# Patient Record
Sex: MALE | Race: WHITE | NOT HISPANIC OR LATINO | Employment: FULL TIME | URBAN - METROPOLITAN AREA
[De-identification: names, ages, dates, MRNs, and addresses within clinical notes are randomized per-mention and may not be internally consistent; named-entity substitution may affect disease eponyms.]

---

## 2017-02-14 ENCOUNTER — OFFICE VISIT (OUTPATIENT)
Dept: FAMILY MEDICINE CLINIC | Facility: CLINIC | Age: 42
End: 2017-02-14

## 2017-02-14 VITALS
BODY MASS INDEX: 33.5 KG/M2 | OXYGEN SATURATION: 98 % | HEIGHT: 70 IN | RESPIRATION RATE: 20 BRPM | SYSTOLIC BLOOD PRESSURE: 124 MMHG | WEIGHT: 234 LBS | DIASTOLIC BLOOD PRESSURE: 78 MMHG | HEART RATE: 66 BPM

## 2017-02-14 DIAGNOSIS — Z00.00 HEALTHCARE MAINTENANCE: Primary | ICD-10-CM

## 2017-02-14 LAB
ALBUMIN SERPL-MCNC: 4.5 G/DL (ref 3.2–4.8)
ALBUMIN/GLOB SERPL: 1.6 G/DL (ref 1.5–2.5)
ALP SERPL-CCNC: 49 U/L (ref 25–100)
ALT SERPL W P-5'-P-CCNC: 36 U/L (ref 7–40)
ANION GAP SERPL CALCULATED.3IONS-SCNC: 4 MMOL/L (ref 3–11)
ARTICHOKE IGE QN: 161 MG/DL (ref 0–130)
AST SERPL-CCNC: 31 U/L (ref 0–33)
BASOPHILS # BLD AUTO: 0.05 10*3/MM3 (ref 0–0.2)
BASOPHILS NFR BLD AUTO: 0.6 % (ref 0–1)
BILIRUB BLD-MCNC: NEGATIVE MG/DL
BILIRUB SERPL-MCNC: 0.6 MG/DL (ref 0.3–1.2)
BUN BLD-MCNC: 14 MG/DL (ref 9–23)
BUN/CREAT SERPL: 20 (ref 7–25)
CALCIUM SPEC-SCNC: 9.8 MG/DL (ref 8.7–10.4)
CHLORIDE SERPL-SCNC: 103 MMOL/L (ref 99–109)
CHOLEST SERPL-MCNC: 239 MG/DL (ref 0–200)
CLARITY, POC: CLEAR
CO2 SERPL-SCNC: 31 MMOL/L (ref 20–31)
COLOR UR: YELLOW
CREAT BLD-MCNC: 0.7 MG/DL (ref 0.6–1.3)
CRP SERPL-MCNC: 1.5 MG/DL (ref 0–10)
DEPRECATED RDW RBC AUTO: 44.2 FL (ref 37–54)
EOSINOPHIL # BLD AUTO: 0.09 10*3/MM3 (ref 0.1–0.3)
EOSINOPHIL NFR BLD AUTO: 1.1 % (ref 0–3)
ERYTHROCYTE [DISTWIDTH] IN BLOOD BY AUTOMATED COUNT: 12.9 % (ref 11.3–14.5)
GFR SERPL CREATININE-BSD FRML MDRD: 124 ML/MIN/1.73
GLOBULIN UR ELPH-MCNC: 2.8 GM/DL
GLUCOSE BLD-MCNC: 88 MG/DL (ref 70–100)
GLUCOSE UR STRIP-MCNC: NEGATIVE MG/DL
HCT VFR BLD AUTO: 47.3 % (ref 38.9–50.9)
HDLC SERPL-MCNC: 55 MG/DL (ref 40–60)
HGB BLD-MCNC: 15.7 G/DL (ref 13.1–17.5)
IMM GRANULOCYTES # BLD: 0.02 10*3/MM3 (ref 0–0.03)
IMM GRANULOCYTES NFR BLD: 0.2 % (ref 0–0.6)
KETONES UR QL: NEGATIVE
LEUKOCYTE EST, POC: NEGATIVE
LYMPHOCYTES # BLD AUTO: 2.81 10*3/MM3 (ref 0.6–4.8)
LYMPHOCYTES NFR BLD AUTO: 34.8 % (ref 24–44)
MCH RBC QN AUTO: 30.9 PG (ref 27–31)
MCHC RBC AUTO-ENTMCNC: 33.2 G/DL (ref 32–36)
MCV RBC AUTO: 93.1 FL (ref 80–99)
MONOCYTES # BLD AUTO: 0.53 10*3/MM3 (ref 0–1)
MONOCYTES NFR BLD AUTO: 6.6 % (ref 0–12)
NEUTROPHILS # BLD AUTO: 4.57 10*3/MM3 (ref 1.5–8.3)
NEUTROPHILS NFR BLD AUTO: 56.7 % (ref 41–71)
NITRITE UR-MCNC: NEGATIVE MG/ML
PH UR: 6 [PH] (ref 5–8)
PLATELET # BLD AUTO: 247 10*3/MM3 (ref 150–450)
PMV BLD AUTO: 10.1 FL (ref 6–12)
POTASSIUM BLD-SCNC: 4.6 MMOL/L (ref 3.5–5.5)
PROT SERPL-MCNC: 7.3 G/DL (ref 5.7–8.2)
PROT UR STRIP-MCNC: NEGATIVE MG/DL
RBC # BLD AUTO: 5.08 10*6/MM3 (ref 4.2–5.76)
RBC # UR STRIP: NEGATIVE /UL
SODIUM BLD-SCNC: 138 MMOL/L (ref 132–146)
SP GR UR: 1.01 (ref 1–1.03)
TRIGL SERPL-MCNC: 230 MG/DL (ref 0–150)
TSH SERPL DL<=0.05 MIU/L-ACNC: 1.25 MIU/ML (ref 0.35–5.35)
URATE SERPL-MCNC: 6.6 MG/DL (ref 3.7–9.2)
UROBILINOGEN UR QL: NORMAL
WBC NRBC COR # BLD: 8.07 10*3/MM3 (ref 3.5–10.8)

## 2017-02-14 PROCEDURE — 81003 URINALYSIS AUTO W/O SCOPE: CPT | Performed by: FAMILY MEDICINE

## 2017-02-14 PROCEDURE — 36415 COLL VENOUS BLD VENIPUNCTURE: CPT | Performed by: FAMILY MEDICINE

## 2017-02-14 PROCEDURE — 99396 PREV VISIT EST AGE 40-64: CPT | Performed by: FAMILY MEDICINE

## 2017-02-14 PROCEDURE — 86140 C-REACTIVE PROTEIN: CPT | Performed by: FAMILY MEDICINE

## 2017-02-14 PROCEDURE — 85025 COMPLETE CBC W/AUTO DIFF WBC: CPT | Performed by: FAMILY MEDICINE

## 2017-02-14 PROCEDURE — 80061 LIPID PANEL: CPT | Performed by: FAMILY MEDICINE

## 2017-02-14 PROCEDURE — 84550 ASSAY OF BLOOD/URIC ACID: CPT | Performed by: FAMILY MEDICINE

## 2017-02-14 PROCEDURE — 80053 COMPREHEN METABOLIC PANEL: CPT | Performed by: FAMILY MEDICINE

## 2017-02-14 PROCEDURE — 84443 ASSAY THYROID STIM HORMONE: CPT | Performed by: FAMILY MEDICINE

## 2017-02-14 RX ORDER — AZELASTINE HYDROCHLORIDE AND FLUTICASONE PROPIONATE 137; 50 UG/1; UG/1
SPRAY, METERED NASAL
COMMUNITY
Start: 2016-11-28 | End: 2018-07-24

## 2017-02-14 NOTE — PROGRESS NOTES
Subjective   Louie Levy is a 42 y.o. male.     History of Present Illness   He is here for his annual fasting wellness evaluation.  He declines the annual flu shot.  He voices no acute symptoms.    Review of Systems   Constitutional: Negative.    HENT: Negative.    Eyes: Negative.    Respiratory: Negative.    Cardiovascular: Negative.    Gastrointestinal: Negative.    Endocrine: Negative.    Genitourinary: Negative.    Musculoskeletal: Negative.    Skin: Negative.    Allergic/Immunologic: Negative.    Neurological: Negative.    Hematological: Negative.    Psychiatric/Behavioral: Negative.      Objective   Physical Exam   Constitutional: He is oriented to person, place, and time. He appears well-developed and well-nourished. He is cooperative.   HENT:   Head: Normocephalic and atraumatic.   Right Ear: Hearing and external ear normal.   Left Ear: Hearing and external ear normal.   Nose: Nose normal.   Mouth/Throat: Uvula is midline, oropharynx is clear and moist and mucous membranes are normal.   Eyes: Conjunctivae and EOM are normal. Pupils are equal, round, and reactive to light. No scleral icterus.   Neck: Trachea normal and normal range of motion. Neck supple. No JVD present. Carotid bruit is not present. No thyromegaly present.   Cardiovascular: Normal rate, regular rhythm, normal heart sounds and intact distal pulses.    Pulmonary/Chest: Effort normal and breath sounds normal.   Abdominal: Soft. Bowel sounds are normal. There is no hepatosplenomegaly. There is no tenderness.   Musculoskeletal: Normal range of motion.   Lymphadenopathy:     He has no cervical adenopathy.   Neurological: He is alert and oriented to person, place, and time. He has normal strength and normal reflexes. No sensory deficit. Gait normal.   Skin: Skin is warm and dry.   Psychiatric: He has a normal mood and affect. His speech is normal and behavior is normal. Judgment and thought content normal. Cognition and memory are normal.    Nursing note and vitals reviewed.    Assessment/Plan   Diagnoses and all orders for this visit:    Healthcare maintenance  -     POC Urinalysis Dipstick, Automated  -     Comprehensive Metabolic Panel  -     CBC & Differential  -     Lipid Panel  -     TSH  -     Uric Acid  -     C-reactive Protein    The patient is here for a health maintenance visit.  Currently, the patient consumes a calorie enriched diet and has an inadequate exercise regimen. Screening lab work is ordered.  Immunizations are reported as current.  He declined the annual flu shot.  Advice and education is given regarding nutrition, aerobic exercise, routine dental evaluations, routine eye exams, reproductive health, cardiovascular risk reduction, sunscreen use, self skin examination (annual dermatology evaluations) and seat belt use (general overall safety).  Further recommendations after lab evaluation.  Annual wellness evaluations recommended.

## 2018-08-28 ENCOUNTER — OFFICE VISIT (OUTPATIENT)
Dept: FAMILY MEDICINE CLINIC | Facility: CLINIC | Age: 43
End: 2018-08-28

## 2018-08-28 VITALS
SYSTOLIC BLOOD PRESSURE: 110 MMHG | BODY MASS INDEX: 25.97 KG/M2 | HEART RATE: 90 BPM | WEIGHT: 181 LBS | RESPIRATION RATE: 16 BRPM | DIASTOLIC BLOOD PRESSURE: 80 MMHG | TEMPERATURE: 98.3 F | OXYGEN SATURATION: 98 %

## 2018-08-28 DIAGNOSIS — R53.83 FATIGUE, UNSPECIFIED TYPE: Primary | ICD-10-CM

## 2018-08-28 PROCEDURE — 36415 COLL VENOUS BLD VENIPUNCTURE: CPT | Performed by: FAMILY MEDICINE

## 2018-08-28 PROCEDURE — 99213 OFFICE O/P EST LOW 20 MIN: CPT | Performed by: FAMILY MEDICINE

## 2018-08-28 RX ORDER — FEXOFENADINE HYDROCHLORIDE 60 MG/1
60 TABLET, FILM COATED ORAL DAILY
COMMUNITY
End: 2019-03-26

## 2018-08-28 RX ORDER — DEXTROAMPHETAMINE SACCHARATE, AMPHETAMINE ASPARTATE, DEXTROAMPHETAMINE SULFATE AND AMPHETAMINE SULFATE 2.5; 2.5; 2.5; 2.5 MG/1; MG/1; MG/1; MG/1
TABLET ORAL
Refills: 0 | COMMUNITY
Start: 2018-08-02 | End: 2021-10-22

## 2018-08-28 NOTE — PROGRESS NOTES
Subjective   Louie Levy is a 43 y.o. male.     History of Present Illness   Head congestion three months with chronic sinus pressure.  Throat drainage.  Has taken antibiotics three rounds without help.  Steroid initially helped then rapidly returned to original state.  Sleeps poor.  Yesterday tingling sensation in skin, muscles sore after camping with son.    Had been on keto diet since first of year and reached target weight.  No cold sensation.  No swelling. Irregular sleep pattern with work, no naps. Last felt good three months ago.   .  The following portions of the patient's history were reviewed and updated as appropriate: allergies, current medications, past family history, past medical history, past social history, past surgical history and problem list.    Review of Systems   Constitutional: Positive for fatigue. Negative for activity change, appetite change and fever.   HENT: Positive for sinus pressure.    Respiratory: Negative.    Gastrointestinal: Negative.        Objective   Physical Exam   Constitutional: He appears well-developed.   HENT:   Right Ear: External ear normal.   Left Ear: External ear normal.   Mouth/Throat: Oropharynx is clear and moist.   Neck: Neck supple.   Cardiovascular: Normal heart sounds.    Pulmonary/Chest: Breath sounds normal.   Lymphadenopathy:     He has no cervical adenopathy.   Vitals reviewed.      Assessment/Plan   Louie was seen today for uri and fatigue.    Diagnoses and all orders for this visit:    Fatigue, unspecified type  -     CBC & Differential  -     T4, Free  -     TSH  -     Vitamin D 25 Hydroxy  -     Hepatitis Panel, Acute  -     HIV-1/O/2 Ag/Ab w Reflex  -     SAM With / DsDNA, RNP, Sjogrens A / B, Smith  -     Sedimentation Rate        Consider evaluation by allergist with these chronic congestion problems.

## 2019-07-28 PROCEDURE — 87086 URINE CULTURE/COLONY COUNT: CPT | Performed by: PHYSICIAN ASSISTANT

## 2019-07-29 ENCOUNTER — TELEPHONE (OUTPATIENT)
Dept: URGENT CARE | Facility: CLINIC | Age: 44
End: 2019-07-29

## 2019-07-30 ENCOUNTER — TELEPHONE (OUTPATIENT)
Dept: URGENT CARE | Facility: CLINIC | Age: 44
End: 2019-07-30

## 2019-08-26 ENCOUNTER — OFFICE VISIT (OUTPATIENT)
Dept: FAMILY MEDICINE CLINIC | Facility: CLINIC | Age: 44
End: 2019-08-26

## 2019-08-26 VITALS
TEMPERATURE: 97.3 F | RESPIRATION RATE: 20 BRPM | DIASTOLIC BLOOD PRESSURE: 74 MMHG | BODY MASS INDEX: 25.91 KG/M2 | OXYGEN SATURATION: 97 % | HEART RATE: 67 BPM | SYSTOLIC BLOOD PRESSURE: 114 MMHG | WEIGHT: 181 LBS | HEIGHT: 70 IN

## 2019-08-26 DIAGNOSIS — Z00.00 HEALTHCARE MAINTENANCE: Primary | ICD-10-CM

## 2019-08-26 LAB
BILIRUB BLD-MCNC: NEGATIVE MG/DL
CLARITY, POC: CLEAR
COLOR UR: YELLOW
GLUCOSE UR STRIP-MCNC: NEGATIVE MG/DL
KETONES UR QL: NEGATIVE
LEUKOCYTE EST, POC: NEGATIVE
NITRITE UR-MCNC: NEGATIVE MG/ML
PH UR: 5.5 [PH] (ref 5–8)
PROT UR STRIP-MCNC: NEGATIVE MG/DL
RBC # UR STRIP: NEGATIVE /UL
SP GR UR: 1 (ref 1–1.03)
UROBILINOGEN UR QL: NORMAL

## 2019-08-26 PROCEDURE — 99396 PREV VISIT EST AGE 40-64: CPT | Performed by: FAMILY MEDICINE

## 2019-08-26 PROCEDURE — 81003 URINALYSIS AUTO W/O SCOPE: CPT | Performed by: FAMILY MEDICINE

## 2019-08-26 NOTE — PROGRESS NOTES
"Subjective   Louie Levy is a 44 y.o. male.     History of Present Illness   He is here for his annual fasting wellness evaluation.  He is current on his immunizations.  He voices no acute symptoms.  He continues care with [facility] with Igor PRESLEY/Dr. Joseph.    Medical History  I have reviewed and updated the following portions of the patient's history: allergies, current medications, past medical history, past surgical history, past family history, and past social history.    Review of Systems   Constitutional: Negative.    HENT: Negative.    Eyes: Negative.    Respiratory: Negative.    Cardiovascular: Negative.    Gastrointestinal: Negative.         Occasional colicky pain with stress   Endocrine: Negative.    Genitourinary: Negative.    Musculoskeletal: Negative.    Skin: Negative.    Allergic/Immunologic: Negative.    Neurological: Negative.    Hematological: Negative.    Psychiatric/Behavioral: Negative.        Objective   /74 (BP Location: Left arm, Patient Position: Sitting, Cuff Size: Adult)   Pulse 67   Temp 97.3 °F (36.3 °C) (Temporal)   Resp 20   Ht 177.8 cm (70\")   Wt 82.1 kg (181 lb)   SpO2 97%   BMI 25.97 kg/m²   Physical Exam   Constitutional: He is oriented to person, place, and time. He appears well-developed and well-nourished. He is cooperative.   HENT:   Head: Normocephalic and atraumatic.   Right Ear: Hearing and external ear normal.   Left Ear: Hearing and external ear normal.   Nose: Nose normal.   Mouth/Throat: Uvula is midline, oropharynx is clear and moist and mucous membranes are normal.   Eyes: Conjunctivae and EOM are normal. Pupils are equal, round, and reactive to light. No scleral icterus.   Neck: Trachea normal and normal range of motion. Neck supple. No JVD present. Carotid bruit is not present. No thyromegaly present.   Cardiovascular: Normal rate, regular rhythm, normal heart sounds and intact distal pulses.   Pulmonary/Chest: Effort normal " and breath sounds normal.   Abdominal: Soft. Bowel sounds are normal. There is no hepatosplenomegaly. There is no tenderness.   Musculoskeletal: Normal range of motion.   Lymphadenopathy:     He has no cervical adenopathy.   Neurological: He is alert and oriented to person, place, and time. He has normal strength and normal reflexes. No sensory deficit. Gait normal.   Skin: Skin is warm and dry.   Psychiatric: He has a normal mood and affect. His speech is normal and behavior is normal. Judgment and thought content normal. Cognition and memory are normal.   Nursing note and vitals reviewed.    Assessment/Plan   Diagnoses and all orders for this visit:    Healthcare maintenance  -     Comprehensive Metabolic Panel  -     POC Urinalysis Dipstick, Automated  -     CBC & Differential  -     Lipid Panel  -     TSH  -     Uric Acid  -     C-reactive Protein  -     HIV-1 / O / 2 Ag / Antibody 4th Generation  -     Hemoglobin A1c    The patient is here for a health maintenance visit.  Currently, the patient consumes a healthy diet and has an adequate exercise regimen. Screening lab work is ordered.  Immunizations are reviewed today.  Advice and education is given regarding nutrition, aerobic exercise, routine dental evaluations, routine eye exams, reproductive health, cardiovascular risk reduction, sunscreen use, self skin examination (annual dermatology evaluations) and seat belt use (general overall safety).  Further recommendations after lab evaluation.  Annual wellness evaluations recommended.

## 2019-08-27 LAB
ALBUMIN SERPL-MCNC: 4.9 G/DL (ref 3.5–5.2)
ALBUMIN/GLOB SERPL: 2.1 G/DL
ALP SERPL-CCNC: 39 U/L (ref 39–117)
ALT SERPL-CCNC: 17 U/L (ref 1–41)
AST SERPL-CCNC: 19 U/L (ref 1–40)
BASOPHILS # BLD AUTO: 0.03 10*3/MM3 (ref 0–0.2)
BASOPHILS NFR BLD AUTO: 0.6 % (ref 0–1.5)
BILIRUB SERPL-MCNC: 0.4 MG/DL (ref 0.2–1.2)
BUN SERPL-MCNC: 11 MG/DL (ref 6–20)
BUN/CREAT SERPL: 13.9 (ref 7–25)
CALCIUM SERPL-MCNC: 9.2 MG/DL (ref 8.6–10.5)
CHLORIDE SERPL-SCNC: 103 MMOL/L (ref 98–107)
CHOLEST SERPL-MCNC: 184 MG/DL (ref 0–200)
CO2 SERPL-SCNC: 25.3 MMOL/L (ref 22–29)
CREAT SERPL-MCNC: 0.79 MG/DL (ref 0.76–1.27)
CRP SERPL-MCNC: 0.07 MG/DL (ref 0–0.5)
EOSINOPHIL # BLD AUTO: 0.05 10*3/MM3 (ref 0–0.4)
EOSINOPHIL NFR BLD AUTO: 1 % (ref 0.3–6.2)
ERYTHROCYTE [DISTWIDTH] IN BLOOD BY AUTOMATED COUNT: 12.7 % (ref 12.3–15.4)
GLOBULIN SER CALC-MCNC: 2.3 GM/DL
GLUCOSE SERPL-MCNC: 97 MG/DL (ref 65–99)
HBA1C MFR BLD: 5.1 % (ref 4.8–5.6)
HCT VFR BLD AUTO: 46.9 % (ref 37.5–51)
HDLC SERPL-MCNC: 63 MG/DL (ref 40–60)
HGB BLD-MCNC: 15 G/DL (ref 13–17.7)
HIV 1+2 AB+HIV1 P24 AG SERPL QL IA: NON REACTIVE
IMM GRANULOCYTES # BLD AUTO: 0 10*3/MM3 (ref 0–0.05)
IMM GRANULOCYTES NFR BLD AUTO: 0 % (ref 0–0.5)
LDLC SERPL CALC-MCNC: 106 MG/DL (ref 0–100)
LYMPHOCYTES # BLD AUTO: 1.8 10*3/MM3 (ref 0.7–3.1)
LYMPHOCYTES NFR BLD AUTO: 35.4 % (ref 19.6–45.3)
MCH RBC QN AUTO: 30.7 PG (ref 26.6–33)
MCHC RBC AUTO-ENTMCNC: 32 G/DL (ref 31.5–35.7)
MCV RBC AUTO: 95.9 FL (ref 79–97)
MONOCYTES # BLD AUTO: 0.36 10*3/MM3 (ref 0.1–0.9)
MONOCYTES NFR BLD AUTO: 7.1 % (ref 5–12)
NEUTROPHILS # BLD AUTO: 2.85 10*3/MM3 (ref 1.7–7)
NEUTROPHILS NFR BLD AUTO: 55.9 % (ref 42.7–76)
NRBC BLD AUTO-RTO: 0 /100 WBC (ref 0–0.2)
PLATELET # BLD AUTO: 237 10*3/MM3 (ref 140–450)
POTASSIUM SERPL-SCNC: 4.3 MMOL/L (ref 3.5–5.2)
PROT SERPL-MCNC: 7.2 G/DL (ref 6–8.5)
RBC # BLD AUTO: 4.89 10*6/MM3 (ref 4.14–5.8)
SODIUM SERPL-SCNC: 144 MMOL/L (ref 136–145)
TRIGL SERPL-MCNC: 75 MG/DL (ref 0–150)
TSH SERPL DL<=0.005 MIU/L-ACNC: 1.23 UIU/ML (ref 0.45–4.5)
URATE SERPL-MCNC: 4.9 MG/DL (ref 3.4–7)
VLDLC SERPL CALC-MCNC: 15 MG/DL
WBC # BLD AUTO: 5.09 10*3/MM3 (ref 3.4–10.8)

## 2020-03-10 ENCOUNTER — OFFICE VISIT (OUTPATIENT)
Dept: INTERNAL MEDICINE | Facility: CLINIC | Age: 45
End: 2020-03-10

## 2020-03-10 VITALS
SYSTOLIC BLOOD PRESSURE: 128 MMHG | HEART RATE: 82 BPM | OXYGEN SATURATION: 99 % | RESPIRATION RATE: 16 BRPM | BODY MASS INDEX: 25.48 KG/M2 | HEIGHT: 70 IN | WEIGHT: 178 LBS | DIASTOLIC BLOOD PRESSURE: 80 MMHG

## 2020-03-10 DIAGNOSIS — Z76.89 ESTABLISHING CARE WITH NEW DOCTOR, ENCOUNTER FOR: Primary | ICD-10-CM

## 2020-03-10 DIAGNOSIS — F90.0 ATTENTION DEFICIT HYPERACTIVITY DISORDER (ADHD), PREDOMINANTLY INATTENTIVE TYPE: ICD-10-CM

## 2020-03-10 PROBLEM — F90.9 ADHD (ATTENTION DEFICIT HYPERACTIVITY DISORDER): Status: ACTIVE | Noted: 2020-03-10

## 2020-03-10 PROCEDURE — 99213 OFFICE O/P EST LOW 20 MIN: CPT | Performed by: NURSE PRACTITIONER

## 2020-03-10 NOTE — PROGRESS NOTES
Louie Levy is a 45 y.o. male who presents today to establish care and discuss meds.    Chief Complaint   Patient presents with   • Establish Care     Pt would like to discuss medications and long term effects.   • ADD       45-year-old male presents to office to establish care and also to discuss his current medications he takes for ADD.  He has a family history of heart disease, and is concerned that some of these medications could make him more predisposed to having heart disease as well, and he just wanted to discuss other options as well as discuss other options with [facility] who currently manages his medications.      The following portions of the patient's history were reviewed and updated as appropriate: allergies, current medications, past family history, past medical history, past social history, past surgical history and problem list.     Past Medical History:   Diagnosis Date   • ADHD (attention deficit hyperactivity disorder)    • Closed fracture of left clavicle    • Mood disorder (CMS/HCC)    • Seasonal allergies        Past Surgical History:   Procedure Laterality Date   • CLAVICLE SURGERY Left    • NASAL SEPTAL RECONSTRUCTION     • WISDOM TOOTH EXTRACTION         Family History   Problem Relation Age of Onset   • Heart failure Mother    • Heart disease Father        Social History     Socioeconomic History   • Marital status:      Spouse name: Annetta   • Number of children: 1   • Years of education: College   • Highest education level: Bachelor's degree (e.g., BA, AB, BS)   Occupational History   • Occupation:      Employer: [employer]   Tobacco Use   • Smoking status: Never Smoker   • Smokeless tobacco: Never Used   Substance and Sexual Activity   • Alcohol use: Yes     Alcohol/week: 1.0 standard drinks     Types: 1 Cans of beer per week     Frequency: Monthly or less   • Drug use: No   • Sexual activity: Yes     Partners: Female  "      No Known Allergies      Current Outpatient Medications:   •  amphetamine-dextroamphetamine (ADDERALL) 10 MG tablet, take 1 tablet by mouth every AFTERNOON, Disp: , Rfl: 0  •  lisdexamfetamine (VYVANSE) 40 MG capsule, Take 40 mg by mouth Every Morning, Disp: , Rfl:     Health Maintenance   Topic Date Due   • ANNUAL PHYSICAL  08/27/2020   • TDAP/TD VACCINES (3 - Td) 03/26/2029   • INFLUENZA VACCINE  Completed        ROS    Review of Systems   Constitutional: Negative for chills and fever.   Eyes: Negative for blurred vision and visual disturbance.   Respiratory: Negative for cough.    Cardiovascular: Negative for chest pain, palpitations and leg swelling.   Gastrointestinal: Negative for abdominal pain and nausea.   Skin: Negative for rash.   Allergic/Immunologic: Negative for immunocompromised state.   Neurological: Negative for headache.   Hematological: Negative for adenopathy.   Psychiatric/Behavioral: Negative for sleep disturbance, suicidal ideas and stress. The patient is not nervous/anxious.        Visit Vitals  /80   Pulse 82   Resp 16   Ht 177.8 cm (70\")   Wt 80.7 kg (178 lb)   SpO2 99%   BMI 25.54 kg/m²       Physical Exam     Physical Exam    Assessment/Plan   Problems Addressed this Visit        Other    ADHD (attention deficit hyperactivity disorder)     Psychological condition is improving with treatment.  Continue current treatment regimen.  Psychological condition  will be reassessed at the next regular appointment.           Other Visit Diagnoses     Establishing care with new doctor, encounter for    -  Primary               Plan of care reviewed with patient at the conclusion of today's visit. Education was provided regarding diagnosis, management and any prescribed or recommended OTC medications.  Patient verbalizes understanding of and agreement with management plan.    Return in about 5 months (around 8/10/2020) for Annual.    SAKINA Lawrence  "

## 2020-09-08 ENCOUNTER — OFFICE VISIT (OUTPATIENT)
Dept: FAMILY MEDICINE CLINIC | Facility: CLINIC | Age: 45
End: 2020-09-08

## 2020-09-08 VITALS
OXYGEN SATURATION: 100 % | SYSTOLIC BLOOD PRESSURE: 142 MMHG | TEMPERATURE: 98 F | BODY MASS INDEX: 26.37 KG/M2 | HEART RATE: 88 BPM | RESPIRATION RATE: 20 BRPM | DIASTOLIC BLOOD PRESSURE: 90 MMHG | HEIGHT: 70 IN | WEIGHT: 184.2 LBS

## 2020-09-08 DIAGNOSIS — L98.9 SKIN LESION: ICD-10-CM

## 2020-09-08 DIAGNOSIS — Z12.5 ENCOUNTER FOR PROSTATE CANCER SCREENING: ICD-10-CM

## 2020-09-08 DIAGNOSIS — R03.0 ELEVATED BLOOD PRESSURE READING: ICD-10-CM

## 2020-09-08 DIAGNOSIS — F90.0 ATTENTION DEFICIT HYPERACTIVITY DISORDER (ADHD), PREDOMINANTLY INATTENTIVE TYPE: ICD-10-CM

## 2020-09-08 DIAGNOSIS — J30.2 SEASONAL ALLERGIES: ICD-10-CM

## 2020-09-08 DIAGNOSIS — Z00.00 HEALTH CARE MAINTENANCE: Primary | ICD-10-CM

## 2020-09-08 DIAGNOSIS — H93.13 TINNITUS OF BOTH EARS: ICD-10-CM

## 2020-09-08 PROCEDURE — 99396 PREV VISIT EST AGE 40-64: CPT | Performed by: NURSE PRACTITIONER

## 2020-09-08 PROCEDURE — 93000 ELECTROCARDIOGRAM COMPLETE: CPT | Performed by: NURSE PRACTITIONER

## 2020-09-08 RX ORDER — FLUTICASONE PROPIONATE 50 MCG
SPRAY, SUSPENSION (ML) NASAL
COMMUNITY
Start: 2020-03-01 | End: 2021-10-22

## 2020-09-08 RX ORDER — MULTIVITAMIN/IRON/FOLIC ACID 18MG-0.4MG
TABLET ORAL
Status: ON HOLD | COMMUNITY
Start: 2018-11-01 | End: 2023-04-01

## 2020-09-08 RX ORDER — KRILL/OM-3/DHA/EPA/PHOSPHO/AST 500MG-86MG
CAPSULE ORAL
Status: ON HOLD | COMMUNITY
Start: 2020-05-01 | End: 2023-04-01

## 2020-09-08 NOTE — PROGRESS NOTES
"Patient is here for annual wellness exam. Former patient of Dr Heredia    Concerns today left ear drainage internally and roaring in his ears. No fever or otalgia. Slightly diminished hearing. Has chronic tinnitus. Has been taking mucinex-DM.    He takes adderall prn in afternoon. Taking vyvanse x 2 years. Prescribed by [facility]-Dagoberto Lyon.    Last health maintenance visit was 1 year . Overall they feel their health is good. Lives with spouse, children and son is 11.  Occupation is . Patient's diet is in general, a \"healthy\" diet  . Exercises regularly regularly 12,000 steps a day and also pushups. Tobacco use Never used. Alcohol use is 2 liquor drinks per week . Illicit drug no history of illicit drug use    Reproductive Health  Patient is sexually active and prefers heterosexual partners.  no method at present time     Screening Tests  Vision Impairment. Needs Eye Exam   Hearing normal  Dental: Brushes does teeth twice a day . Dental exam every six months?yes  Colonoscopy no  Prostate Exam no      Immunization History  Tdap? yes  HPV? not applicable  Pneumonia? not applicable  Shingles? not applicable    The following portions of the patient's history were reviewed and updated as appropriate: allergies, current medications, past family history, past medical history, past social history, past surgical history and problem list.    Past Medical History:   Diagnosis Date   • ADHD (attention deficit hyperactivity disorder)    • Closed fracture of left clavicle    • Mood disorder (CMS/HCC)    • Seasonal allergies    • Tinnitus of both ears 2020       Family History   Problem Relation Age of Onset   • Heart failure Mother    • Heart disease Father    • Skin cancer Father    • Heart failure Father    • No Known Problems Sister    • Heart disease Maternal Grandmother    • Heart failure Paternal Grandfather    • No Known Problems Sister    • No Known Problems Sister        Past Surgical " History:   Procedure Laterality Date   • CLAVICLE SURGERY Left 2015   • NASAL SEPTAL RECONSTRUCTION     • WISDOM TOOTH EXTRACTION         Social History     Socioeconomic History   • Marital status:      Spouse name: Annetta   • Number of children: 1   • Years of education: College   • Highest education level: Bachelor's degree (e.g., BA, AB, BS)   Occupational History   • Occupation:      Employer: [employer]   Tobacco Use   • Smoking status: Never Smoker   • Smokeless tobacco: Never Used   Substance and Sexual Activity   • Alcohol use: Yes     Alcohol/week: 1.0 standard drinks     Types: 1 Cans of beer per week     Frequency: Monthly or less   • Drug use: No   • Sexual activity: Yes     Partners: Female       Review of Systems  Do you have pain that bothers you in your daily life? no  Review of Systems   Constitutional: Negative for diaphoresis, fatigue, fever and unexpected weight change.   HENT: Positive for ear pain. Negative for congestion, hearing loss, nosebleeds, rhinorrhea, sore throat, trouble swallowing and voice change.    Eyes: Negative for discharge, redness and visual disturbance.   Respiratory: Negative for cough, chest tightness, shortness of breath and wheezing.    Cardiovascular: Negative for chest pain, palpitations and leg swelling.   Gastrointestinal: Negative for abdominal pain, blood in stool, constipation, diarrhea, nausea and vomiting.   Endocrine: Negative for polydipsia, polyphagia and polyuria.   Genitourinary: Positive for dysuria. Negative for flank pain, frequency and hematuria.   Musculoskeletal: Positive for arthralgias. Negative for joint swelling and myalgias.        Left clavicle pain with plate   Skin: Negative for color change and rash.        Would like a full skin survey since he previously lived in [facility] and has family history of skin cancer.   Neurological: Negative for dizziness, seizures, syncope, weakness and headaches.    Hematological: Negative for adenopathy. Does not bruise/bleed easily.   Psychiatric/Behavioral: Positive for decreased concentration. Negative for dysphoric mood. The patient is not nervous/anxious.        Objective   Physical Exam   Constitutional: He is oriented to person, place, and time. He appears well-developed and well-nourished. No distress.   HENT:   Head: Normocephalic and atraumatic.   Nose: Nose normal.   Eyes: Conjunctivae are normal. Right eye exhibits no discharge. Left eye exhibits no discharge. No scleral icterus.   Neck: Normal range of motion. Neck supple. No thyromegaly present.   Cardiovascular: Normal rate, regular rhythm, normal heart sounds and intact distal pulses. Exam reveals no gallop and no friction rub.   No murmur heard.  Pulmonary/Chest: Effort normal and breath sounds normal. No respiratory distress. He has no wheezes. He has no rales.   Abdominal: Soft. Bowel sounds are normal. He exhibits no distension and no mass. There is no tenderness. There is no rebound and no guarding.   Musculoskeletal: Normal range of motion. He exhibits no edema or deformity.   Lymphadenopathy:     He has no cervical adenopathy.   Neurological: He is alert and oriented to person, place, and time. He has normal reflexes. He displays normal reflexes. No cranial nerve deficit. Coordination normal.   Skin: Skin is warm and dry. No rash noted.   Psychiatric: He has a normal mood and affect. His behavior is normal. Judgment and thought content normal.   Vitals reviewed.         ECG 12 Lead  Date/Time: 9/8/2020 2:03 PM  Performed by: Shaheen Noe DNP, APRN  Authorized by: Shaheen Noe DNP, SAKINA   Comparison: not compared with previous ECG   Previous ECG: no previous ECG available  Rhythm: sinus rhythm  Rate: normal  BPM: 75  Conduction: conduction normal  ST Segments: ST segments normal  T Waves: T waves normal  QRS axis: normal  Other: no other findings    Clinical impression: normal  ECG          Louie was seen today for annual exam and ear drainage.    Diagnoses and all orders for this visit:    Health care maintenance  -     Ambulatory Referral to Dermatology  -     CBC & Differential  -     Comprehensive Metabolic Panel  -     Hemoglobin A1c  -     Hepatitis C Antibody  -     Lipid Panel  -     T4, Free  -     TSH  -     Vitamin D 25 Hydroxy  -     ECG 12 Lead  -     CBC Auto Differential    Skin lesion  -     Ambulatory Referral to Dermatology    Encounter for prostate cancer screening  -     PSA Screen    Elevated blood pressure reading    Attention deficit hyperactivity disorder (ADHD), predominantly inattentive type  Comments:  Discussed risks/benefits of vyvanse. He lost 55 pounds and it controls his anxiety and attention deficit. Continue meds for now. Can discus with psych as well.    Seasonal allergies    Tinnitus of both ears  Comments:  Cont mucinex and antihistamines.        1. Monitor bp at home and bring log. Encourage DASH diet and 150 minutes of weekly exercise. Reviewed signs and symptoms of MI and stroke. If symptoms persist or worsen go to the ER. May need to start meds if consistently > 140/90.    2. Patient Counseling:  --Nutrition: Stressed importance of moderation in sodium/caffeine intake, saturated fat and cholesterol, caloric balance, sufficient intake of fresh fruits, vegetables, fiber, calcium, iron, and 1 mg of folate supplement per day (for females capable of pregnancy).  --Discussed the issue of estrogen replacement, calcium supplement, and the daily use of baby aspirin.  --Exercise: Stressed the importance of regular exercise.   --Substance Abuse: Discussed cessation/primary prevention of tobacco, alcohol, or other drug use; driving or other dangerous activities under the influence; availability of treatment for abuse.    --Sexuality: Discussed sexually transmitted diseases, partner selection, use of condoms, avoidance of unintended pregnancy  and contraceptive  alternatives.   --Injury prevention: Discussed safety belts, safety helmets, smoke detector, smoking near bedding or upholstery.   --Dental health: Discussed importance of regular tooth brushing, flossing, and dental visits.  --Immunizations reviewed.  --Discussed benefits of screening colonoscopy.  --After hours service discussed with patient    3. Discussed the patient's BMI with him.  The BMI is above average; BMI management plan is completed  4. Follow up in one year  5. Discussed the nature of the disease including, risks, complications, implications, management, safe and proper use of medications. Encouraged therapeutic lifestyle changes including low calorie diet with plenty of fruits and vegetables, daily exercise, medication compliance, and keeping scheduled follow up appointments with me and any other providers. Encouraged patient to have appointment for complete physical, fasting labs, appropriate screenings, and immunizations on an annual basis.

## 2020-09-08 NOTE — PROGRESS NOTES
"Subjective   Louie Levy is a 45 y.o. male.     History of Present Illness     Outpatient Encounter Medications as of 9/8/2020   Medication Sig Dispense Refill   • amphetamine-dextroamphetamine (ADDERALL) 10 MG tablet take 1 tablet by mouth every AFTERNOON  0   • Cetirizine HCl (ZyrTEC Allergy) 10 MG capsule      • fluticasone (FLONASE) 50 MCG/ACT nasal spray      • Krill Oil 500 MG capsule      • lisdexamfetamine (VYVANSE) 40 MG capsule Take 40 mg by mouth Every Morning     • Multiple Vitamins-Minerals (EQ COMPLETE MULTIVITAMIN-ADULT) tablet      • [DISCONTINUED] lisdexamfetamine (Vyvanse) 40 MG capsule        No facility-administered encounter medications on file as of 9/8/2020.        {Common H&P Review Areas:22829}    Review of Systems    Objective     Visit Vitals  /90   Pulse 88   Temp 98 °F (36.7 °C)   Resp 20   Ht 177.8 cm (70\")   Wt 83.6 kg (184 lb 3.2 oz)   SpO2 100%   BMI 26.43 kg/m²       Physical Exam      Assessment/Plan   {Assess/PlanSmartLinks:86530}           "

## 2020-09-09 ENCOUNTER — LAB (OUTPATIENT)
Dept: FAMILY MEDICINE CLINIC | Facility: CLINIC | Age: 45
End: 2020-09-09

## 2020-09-09 ENCOUNTER — TELEPHONE (OUTPATIENT)
Dept: FAMILY MEDICINE CLINIC | Facility: CLINIC | Age: 45
End: 2020-09-09

## 2020-09-09 LAB
25(OH)D3 SERPL-MCNC: 35.4 NG/ML (ref 30–100)
ALBUMIN SERPL-MCNC: 4.3 G/DL (ref 3.5–5.2)
ALBUMIN/GLOB SERPL: 1.5 G/DL
ALP SERPL-CCNC: 39 U/L (ref 39–117)
ALT SERPL W P-5'-P-CCNC: 18 U/L (ref 1–41)
ANION GAP SERPL CALCULATED.3IONS-SCNC: 10.6 MMOL/L (ref 5–15)
AST SERPL-CCNC: 27 U/L (ref 1–40)
BASOPHILS # BLD AUTO: 0.06 10*3/MM3 (ref 0–0.2)
BASOPHILS NFR BLD AUTO: 1.2 % (ref 0–1.5)
BILIRUB SERPL-MCNC: 0.4 MG/DL (ref 0–1.2)
BUN SERPL-MCNC: 15 MG/DL (ref 6–20)
BUN/CREAT SERPL: 18.1 (ref 7–25)
CALCIUM SPEC-SCNC: 9.3 MG/DL (ref 8.6–10.5)
CHLORIDE SERPL-SCNC: 103 MMOL/L (ref 98–107)
CHOLEST SERPL-MCNC: 186 MG/DL (ref 0–200)
CO2 SERPL-SCNC: 25.4 MMOL/L (ref 22–29)
CREAT SERPL-MCNC: 0.83 MG/DL (ref 0.76–1.27)
DEPRECATED RDW RBC AUTO: 40.3 FL (ref 37–54)
EOSINOPHIL # BLD AUTO: 0.07 10*3/MM3 (ref 0–0.4)
EOSINOPHIL NFR BLD AUTO: 1.3 % (ref 0.3–6.2)
ERYTHROCYTE [DISTWIDTH] IN BLOOD BY AUTOMATED COUNT: 12.1 % (ref 12.3–15.4)
GFR SERPL CREATININE-BSD FRML MDRD: 101 ML/MIN/1.73
GLOBULIN UR ELPH-MCNC: 2.8 GM/DL
GLUCOSE SERPL-MCNC: 95 MG/DL (ref 65–99)
HBA1C MFR BLD: 5.41 % (ref 4.8–5.6)
HCT VFR BLD AUTO: 44.2 % (ref 37.5–51)
HCV AB SER DONR QL: NORMAL
HDLC SERPL-MCNC: 68 MG/DL (ref 40–60)
HGB BLD-MCNC: 15.3 G/DL (ref 13–17.7)
IMM GRANULOCYTES # BLD AUTO: 0.01 10*3/MM3 (ref 0–0.05)
IMM GRANULOCYTES NFR BLD AUTO: 0.2 % (ref 0–0.5)
LDLC SERPL CALC-MCNC: 104 MG/DL (ref 0–100)
LDLC/HDLC SERPL: 1.53 {RATIO}
LYMPHOCYTES # BLD AUTO: 1.76 10*3/MM3 (ref 0.7–3.1)
LYMPHOCYTES NFR BLD AUTO: 33.9 % (ref 19.6–45.3)
MCH RBC QN AUTO: 31.4 PG (ref 26.6–33)
MCHC RBC AUTO-ENTMCNC: 34.6 G/DL (ref 31.5–35.7)
MCV RBC AUTO: 90.8 FL (ref 79–97)
MONOCYTES # BLD AUTO: 0.54 10*3/MM3 (ref 0.1–0.9)
MONOCYTES NFR BLD AUTO: 10.4 % (ref 5–12)
NEUTROPHILS NFR BLD AUTO: 2.75 10*3/MM3 (ref 1.7–7)
NEUTROPHILS NFR BLD AUTO: 53 % (ref 42.7–76)
NRBC BLD AUTO-RTO: 0 /100 WBC (ref 0–0.2)
PLATELET # BLD AUTO: 234 10*3/MM3 (ref 140–450)
PMV BLD AUTO: 10.1 FL (ref 6–12)
POTASSIUM SERPL-SCNC: 5.1 MMOL/L (ref 3.5–5.2)
PROT SERPL-MCNC: 7.1 G/DL (ref 6–8.5)
PSA SERPL-MCNC: 1.53 NG/ML (ref 0–4)
RBC # BLD AUTO: 4.87 10*6/MM3 (ref 4.14–5.8)
SODIUM SERPL-SCNC: 139 MMOL/L (ref 136–145)
T4 FREE SERPL-MCNC: 1.03 NG/DL (ref 0.93–1.7)
TRIGL SERPL-MCNC: 69 MG/DL (ref 0–150)
TSH SERPL DL<=0.05 MIU/L-ACNC: 1.41 UIU/ML (ref 0.27–4.2)
VLDLC SERPL-MCNC: 13.8 MG/DL (ref 5–40)
WBC # BLD AUTO: 5.19 10*3/MM3 (ref 3.4–10.8)

## 2020-09-09 PROCEDURE — G0103 PSA SCREENING: HCPCS | Performed by: NURSE PRACTITIONER

## 2020-09-09 PROCEDURE — 83036 HEMOGLOBIN GLYCOSYLATED A1C: CPT | Performed by: NURSE PRACTITIONER

## 2020-09-09 PROCEDURE — 84439 ASSAY OF FREE THYROXINE: CPT | Performed by: NURSE PRACTITIONER

## 2020-09-09 PROCEDURE — 86803 HEPATITIS C AB TEST: CPT | Performed by: NURSE PRACTITIONER

## 2020-09-09 PROCEDURE — 82306 VITAMIN D 25 HYDROXY: CPT | Performed by: NURSE PRACTITIONER

## 2020-09-09 PROCEDURE — 84443 ASSAY THYROID STIM HORMONE: CPT | Performed by: NURSE PRACTITIONER

## 2020-09-09 PROCEDURE — 80053 COMPREHEN METABOLIC PANEL: CPT | Performed by: NURSE PRACTITIONER

## 2020-09-09 PROCEDURE — 85025 COMPLETE CBC W/AUTO DIFF WBC: CPT | Performed by: NURSE PRACTITIONER

## 2020-09-09 PROCEDURE — 80061 LIPID PANEL: CPT | Performed by: NURSE PRACTITIONER

## 2020-09-09 NOTE — TELEPHONE ENCOUNTER
PT IS REQUESTING THE NAME AND NUMBER OF THE DERMATOLOGIST HE WAS REFERRED TOO.        PLEASE ADVISE  [id number]

## 2021-10-18 ENCOUNTER — TELEPHONE (OUTPATIENT)
Dept: FAMILY MEDICINE CLINIC | Facility: CLINIC | Age: 46
End: 2021-10-18

## 2021-10-18 ENCOUNTER — READMISSION MANAGEMENT (OUTPATIENT)
Dept: CALL CENTER | Facility: HOSPITAL | Age: 46
End: 2021-10-18

## 2021-10-18 NOTE — OUTREACH NOTE
Prep Survey      Responses   [facility] facility patient discharged from? Non-BH   Is LACE score < 7 ? Non-BH Discharge   Emergency Room discharge w/ pulse ox? No   Eligibility [facility]   Date of Discharge 10/19/20   Discharge diagnosis SDH, SAH   Does the patient have one of the following disease processes/diagnoses(primary or secondary)? Other   Prep survey completed? Yes          Anna Fitzgerald RN

## 2021-10-18 NOTE — TELEPHONE ENCOUNTER
Caller:  KIANNA     Relationship to patient: Provider    Best call back number: [phone]    New or established patient?  [] New  [x] Established    Date of discharge: 10/19/21    Facility discharged from: [facility]     Diagnosis/Symptoms: SDH, SAH    Length of stay (If applicable): 6 DAYS                                                        OFFBOARDING EDUARDO SILVERMAN: HOSPITAL FOLLOW UP: [facility], DISCHARGE DATE: 10/19/21, DURATION OF STAY: 6 DAYS, DX: SDH, SAH

## 2021-10-19 ENCOUNTER — TRANSITIONAL CARE MANAGEMENT TELEPHONE ENCOUNTER (OUTPATIENT)
Dept: CALL CENTER | Facility: HOSPITAL | Age: 46
End: 2021-10-19

## 2021-10-19 NOTE — OUTREACH NOTE
Call Center TCM Note      Responses   [facility] patient discharged from? Non-   Does the patient have one of the following disease processes/diagnoses(primary or secondary)? Other   TCM attempt successful? Yes   Call start time 1245   Call end time 1250   Discharge diagnosis SDH, SAH   Person spoke with today (if not patient) and relationship Patient   Meds reviewed with patient/caregiver? Yes   Is the patient having any side effects they believe may be caused by any medication additions or changes? No   Does the patient have all medications ordered at discharge? Yes   Is the patient taking all medications as directed (includes completed medication regime)? Yes   Does the patient have a primary care provider?  Yes   Does the patient have an appointment with their PCP within 7 days of discharge? Yes   Comments regarding PCP [facility] appt on 10/22/21 at 11:00 AM   Has the patient kept scheduled appointments due by today? N/A   Psychosocial issues? No   Did the patient receive a copy of their discharge instructions? Yes   Nursing interventions Reviewed instructions with patient   What is the patient's perception of their health status since discharge? Improving   Is the patient/caregiver able to teach back signs and symptoms related to disease process for when to call PCP? Yes   Is the patient/caregiver able to teach back signs and symptoms related to disease process for when to call 911? Yes   Is the patient/caregiver able to teach back the hierarchy of who to call/visit for symptoms/problems? PCP, Specialist, Home health nurse, Urgent Care, ED, 911 Yes   If the patient is a current smoker, are they able to teach back resources for cessation? Not a smoker   TCM call completed? Yes   Wrap up additional comments Pt states he is doing ok. Pt verified PCP [facility] appt on 10/22/21. Questions/concerns addressed.          Elvira Arreola RN    10/19/2021, 12:51 EDT

## 2021-10-22 ENCOUNTER — OFFICE VISIT (OUTPATIENT)
Dept: FAMILY MEDICINE CLINIC | Facility: CLINIC | Age: 46
End: 2021-10-22

## 2021-10-22 VITALS
OXYGEN SATURATION: 98 % | WEIGHT: 199 LBS | TEMPERATURE: 98.6 F | BODY MASS INDEX: 28.49 KG/M2 | SYSTOLIC BLOOD PRESSURE: 118 MMHG | HEART RATE: 78 BPM | RESPIRATION RATE: 18 BRPM | HEIGHT: 70 IN | DIASTOLIC BLOOD PRESSURE: 78 MMHG

## 2021-10-22 DIAGNOSIS — Z12.11 SCREEN FOR COLON CANCER: ICD-10-CM

## 2021-10-22 DIAGNOSIS — I61.9 BRAIN BLEED (HCC): ICD-10-CM

## 2021-10-22 DIAGNOSIS — F90.0 ATTENTION DEFICIT HYPERACTIVITY DISORDER (ADHD), PREDOMINANTLY INATTENTIVE TYPE: ICD-10-CM

## 2021-10-22 DIAGNOSIS — Z00.00 ANNUAL PHYSICAL EXAM: Primary | ICD-10-CM

## 2021-10-22 DIAGNOSIS — F39 MOOD DISORDER (HCC): ICD-10-CM

## 2021-10-22 DIAGNOSIS — Z87.898 HISTORY OF ALCOHOL USE: ICD-10-CM

## 2021-10-22 LAB
BASOPHILS # BLD AUTO: 0.07 10*3/MM3 (ref 0–0.2)
BASOPHILS NFR BLD AUTO: 1 % (ref 0–1.5)
DEPRECATED RDW RBC AUTO: 42.3 FL (ref 37–54)
EOSINOPHIL # BLD AUTO: 0.05 10*3/MM3 (ref 0–0.4)
EOSINOPHIL NFR BLD AUTO: 0.7 % (ref 0.3–6.2)
ERYTHROCYTE [DISTWIDTH] IN BLOOD BY AUTOMATED COUNT: 12.3 % (ref 12.3–15.4)
HCT VFR BLD AUTO: 44.1 % (ref 37.5–51)
HGB BLD-MCNC: 14.4 G/DL (ref 13–17.7)
IMM GRANULOCYTES # BLD AUTO: 0.03 10*3/MM3 (ref 0–0.05)
IMM GRANULOCYTES NFR BLD AUTO: 0.4 % (ref 0–0.5)
LYMPHOCYTES # BLD AUTO: 2.28 10*3/MM3 (ref 0.7–3.1)
LYMPHOCYTES NFR BLD AUTO: 34.1 % (ref 19.6–45.3)
MCH RBC QN AUTO: 30.9 PG (ref 26.6–33)
MCHC RBC AUTO-ENTMCNC: 32.7 G/DL (ref 31.5–35.7)
MCV RBC AUTO: 94.6 FL (ref 79–97)
MONOCYTES # BLD AUTO: 0.52 10*3/MM3 (ref 0.1–0.9)
MONOCYTES NFR BLD AUTO: 7.8 % (ref 5–12)
NEUTROPHILS NFR BLD AUTO: 3.73 10*3/MM3 (ref 1.7–7)
NEUTROPHILS NFR BLD AUTO: 56 % (ref 42.7–76)
NRBC BLD AUTO-RTO: 0 /100 WBC (ref 0–0.2)
PLATELET # BLD AUTO: 382 10*3/MM3 (ref 140–450)
PMV BLD AUTO: 9.2 FL (ref 6–12)
RBC # BLD AUTO: 4.66 10*6/MM3 (ref 4.14–5.8)
WBC # BLD AUTO: 6.68 10*3/MM3 (ref 3.4–10.8)

## 2021-10-22 PROCEDURE — 85025 COMPLETE CBC W/AUTO DIFF WBC: CPT | Performed by: STUDENT IN AN ORGANIZED HEALTH CARE EDUCATION/TRAINING PROGRAM

## 2021-10-22 PROCEDURE — 99496 TRANSJ CARE MGMT HIGH F2F 7D: CPT | Performed by: STUDENT IN AN ORGANIZED HEALTH CARE EDUCATION/TRAINING PROGRAM

## 2021-10-22 PROCEDURE — 80053 COMPREHEN METABOLIC PANEL: CPT | Performed by: STUDENT IN AN ORGANIZED HEALTH CARE EDUCATION/TRAINING PROGRAM

## 2021-10-22 PROCEDURE — 36415 COLL VENOUS BLD VENIPUNCTURE: CPT | Performed by: STUDENT IN AN ORGANIZED HEALTH CARE EDUCATION/TRAINING PROGRAM

## 2021-10-22 NOTE — ASSESSMENT & PLAN NOTE
Has apt with neurosurgery and specialized ophthalmologist per hpi. Doing well overall and participating in pt.

## 2021-10-22 NOTE — ASSESSMENT & PLAN NOTE
Was given trazodone while in rehab but discussed to him this can cause a bleeding risk which he already has a history of so he says he may just try melatonin. No suicidal homicidal thoughts. No depression right now but feelings of sadness but gratitude that he is okay.

## 2021-10-22 NOTE — PROGRESS NOTES
New Patient Office Visit      Patient Name: Louie Levy  : 1975   MRN: [id number]     Chief Complaint:  Follow-up (pt fell and has a brain injury)     History of Present Illness:     Fall   he says he had been drinking. He was at [facility]. He was taken by ambulance to [facility]. He was with his dad at an assisted living facility. He had fallen and hit his head on the floor and the side of the table. He says he does not remember this happened. He fracted his occipital condyle. He has referral to neurologic optometrist and is waiting on the apt. He has a follow up with neurosurgeon at [facility] he did not undergo surgery that is on . He had an epidural hematoma subdural hematoma pulmonary contusion and subarachnoid hemorrhage. D/c 10/10/21. He went to [facility] until Tuesday. His vision is now blurry and double vision and light sensitivity. No problem word finding or memory. No weakness. Still doing outpatient therapy. No sob or cough cp.     Doesn't take his adderall anymore as he says he still has adhd but told his dr at [facility] he wanted to stop taking as he wanted to protect his heart long term. He still has follow up with them.   Taking tylenol for headaches which are not severe and takes trazodone at night to help with sleep. He was given metaclopramide from hospital which he is not taking as [facility] stopped it.     Says he felt he was drinking too much for sure for the past couple years. He has not drank at all since then and has not had cravings.     When asked about sadness he says he still will have some feeling of sadness but no suicidal or homicidal thoughts. He was placed on trazodone by [facility].       Subjective        Past Medical History:   Diagnosis Date   • ADHD (attention deficit hyperactivity disorder)    • Closed fracture of left clavicle    • Mood disorder (HCC)    • Seasonal allergies    • Tinnitus of both ears 2020       Past  "Surgical History:   Procedure Laterality Date   • CLAVICLE SURGERY Left 2015   • NASAL SEPTAL RECONSTRUCTION  1992   • WISDOM TOOTH EXTRACTION  1990       Family History   Problem Relation Age of Onset   • Heart failure Mother    • Heart disease Father    • Skin cancer Father    • Heart failure Father    • No Known Problems Sister    • Heart disease Maternal Grandmother    • Heart failure Paternal Grandfather    • No Known Problems Sister    • No Known Problems Sister        Social History     Socioeconomic History   • Marital status:      Spouse name: Annetta   • Number of children: 1   • Years of education: College   • Highest education level: Bachelor's degree (e.g., BA, AB, BS)   Tobacco Use   • Smoking status: Never Smoker   • Smokeless tobacco: Never Used   Substance and Sexual Activity   • Alcohol use: Yes     Alcohol/week: 1.0 standard drink     Types: 1 Cans of beer per week   • Drug use: No   • Sexual activity: Yes     Partners: Female          Current Outpatient Medications:   •  Cetirizine HCl (ZyrTEC Allergy) 10 MG capsule, , Disp: , Rfl:   •  Krill Oil 500 MG capsule, , Disp: , Rfl:   •  Multiple Vitamins-Minerals (EQ COMPLETE MULTIVITAMIN-ADULT) tablet, , Disp: , Rfl:     No Known Allergies    Objective     Physical Exam:  Vitals:    10/22/21 1059   BP: 118/78   Pulse: 78   Resp: 18   Temp: 98.6 °F (37 °C)   SpO2: 98%   Weight: 90.3 kg (199 lb)   Height: 177.8 cm (70\")      Body mass index is 28.55 kg/m².     Physical Exam  Constitutional:       General: He is not in acute distress.     Appearance: Normal appearance.   HENT:      Head: Normocephalic and atraumatic.   Eyes:      Extraocular Movements: Extraocular movements intact.   Cardiovascular:      Rate and Rhythm: Normal rate and regular rhythm.      Heart sounds: No murmur heard.      Pulmonary:      Effort: Pulmonary effort is normal. No respiratory distress.      Breath sounds: Normal breath sounds.   Abdominal:      General: Abdomen is " flat.   Musculoskeletal:         General: No swelling.      Cervical back: Normal range of motion.   Skin:     Findings: No rash.   Neurological:      Mental Status: He is alert and oriented to person, place, and time. Mental status is at baseline.      Cranial Nerves: No cranial nerve deficit.      Sensory: No sensory deficit.      Motor: No weakness.      Gait: Gait normal.      Comments: Has nystagmus horizontal left and right. Perrla. Has dysconjugate gaze.    Psychiatric:         Mood and Affect: Mood normal.              Assessment / Plan      Assessment/Plan:   Diagnoses and all orders for this visit:    1. Annual physical exam (Primary)  Assessment & Plan:  Counseled on diet and exercise including limited sweets and sugars to focus on lean meat and vegetables. Counseled on  moderate exercise 3 times per week.   He is willing to go for colonscopy. Discussed vaccines.       2. Brain bleed (HCC)  Assessment & Plan:  Has apt with neurosurgery and specialized ophthalmologist per hpi. Doing well overall and participating in pt.     Orders:  -     CBC & Differential  -     Comprehensive Metabolic Panel    3. Attention deficit hyperactivity disorder (ADHD), predominantly inattentive type  Assessment & Plan:  Followed by mental health. Currently off medications.       4. Mood disorder (HCC)  Assessment & Plan:  Was given trazodone while in rehab but discussed to him this can cause a bleeding risk which he already has a history of so he says he may just try melatonin. No suicidal homicidal thoughts. No depression right now but feelings of sadness but gratitude that he is okay.       5. Screen for colon cancer  -     Ambulatory Referral to Gastroenterology    6. History of alcohol use  Assessment & Plan:  He is following with mental health and I let him know they have resources for him to help him with discontinuation of alcohol. He is currently doing well and has no urge to drink. Let him know to tell us if he needs  anything at all.          Return in about 2 months (around 12/22/2021).       Trina Feldman D.O.  [facility]

## 2021-10-22 NOTE — ASSESSMENT & PLAN NOTE
Counseled on diet and exercise including limited sweets and sugars to focus on lean meat and vegetables. Counseled on  moderate exercise 3 times per week.   He is willing to go for colonscopy. Discussed vaccines.

## 2021-10-22 NOTE — ASSESSMENT & PLAN NOTE
He is following with mental health and I let him know they have resources for him to help him with discontinuation of alcohol. He is currently doing well and has no urge to drink. Let him know to tell us if he needs anything at all.

## 2021-10-23 LAB
ALBUMIN SERPL-MCNC: 4.6 G/DL (ref 3.5–5.2)
ALBUMIN/GLOB SERPL: 1.6 G/DL
ALP SERPL-CCNC: 47 U/L (ref 39–117)
ALT SERPL W P-5'-P-CCNC: 20 U/L (ref 1–41)
ANION GAP SERPL CALCULATED.3IONS-SCNC: 9.8 MMOL/L (ref 5–15)
AST SERPL-CCNC: 23 U/L (ref 1–40)
BILIRUB SERPL-MCNC: 0.2 MG/DL (ref 0–1.2)
BUN SERPL-MCNC: 18 MG/DL (ref 6–20)
BUN/CREAT SERPL: 23.4 (ref 7–25)
CALCIUM SPEC-SCNC: 9.8 MG/DL (ref 8.6–10.5)
CHLORIDE SERPL-SCNC: 102 MMOL/L (ref 98–107)
CO2 SERPL-SCNC: 26.2 MMOL/L (ref 22–29)
CREAT SERPL-MCNC: 0.77 MG/DL (ref 0.76–1.27)
GFR SERPL CREATININE-BSD FRML MDRD: 109 ML/MIN/1.73
GLOBULIN UR ELPH-MCNC: 2.8 GM/DL
GLUCOSE SERPL-MCNC: 89 MG/DL (ref 65–99)
POTASSIUM SERPL-SCNC: 5.3 MMOL/L (ref 3.5–5.2)
PROT SERPL-MCNC: 7.4 G/DL (ref 6–8.5)
SODIUM SERPL-SCNC: 138 MMOL/L (ref 136–145)

## 2021-10-25 DIAGNOSIS — Z12.11 SCREENING FOR COLON CANCER: Primary | ICD-10-CM

## 2021-10-25 DIAGNOSIS — E87.5 HYPERKALEMIA: Primary | ICD-10-CM

## 2022-04-06 ENCOUNTER — LAB (OUTPATIENT)
Dept: LAB | Facility: HOSPITAL | Age: 47
End: 2022-04-06

## 2022-04-06 ENCOUNTER — OFFICE VISIT (OUTPATIENT)
Dept: FAMILY MEDICINE CLINIC | Facility: CLINIC | Age: 47
End: 2022-04-06

## 2022-04-06 VITALS
HEART RATE: 84 BPM | WEIGHT: 196 LBS | HEIGHT: 70 IN | DIASTOLIC BLOOD PRESSURE: 80 MMHG | RESPIRATION RATE: 16 BRPM | BODY MASS INDEX: 28.06 KG/M2 | OXYGEN SATURATION: 99 % | TEMPERATURE: 98.7 F | SYSTOLIC BLOOD PRESSURE: 130 MMHG

## 2022-04-06 DIAGNOSIS — E87.5 HYPERKALEMIA: ICD-10-CM

## 2022-04-06 DIAGNOSIS — R68.89 FLU-LIKE SYMPTOMS: Primary | ICD-10-CM

## 2022-04-06 DIAGNOSIS — J32.9 SINUSITIS, UNSPECIFIED CHRONICITY, UNSPECIFIED LOCATION: ICD-10-CM

## 2022-04-06 LAB
EXPIRATION DATE: NORMAL
FLUAV AG NPH QL: NEGATIVE
FLUBV AG NPH QL: NEGATIVE
INTERNAL CONTROL: NORMAL
Lab: NORMAL
POTASSIUM SERPL-SCNC: 4.1 MMOL/L (ref 3.5–5.2)

## 2022-04-06 PROCEDURE — 87804 INFLUENZA ASSAY W/OPTIC: CPT | Performed by: STUDENT IN AN ORGANIZED HEALTH CARE EDUCATION/TRAINING PROGRAM

## 2022-04-06 PROCEDURE — 99214 OFFICE O/P EST MOD 30 MIN: CPT | Performed by: STUDENT IN AN ORGANIZED HEALTH CARE EDUCATION/TRAINING PROGRAM

## 2022-04-06 PROCEDURE — 36415 COLL VENOUS BLD VENIPUNCTURE: CPT

## 2022-04-06 PROCEDURE — 84132 ASSAY OF SERUM POTASSIUM: CPT

## 2022-04-06 RX ORDER — LEMBOREXANT 5 MG/1
1 TABLET, FILM COATED ORAL
Status: ON HOLD | COMMUNITY
Start: 2022-02-28 | End: 2023-04-01

## 2022-04-06 NOTE — PROGRESS NOTES
"Chief Complaint  Nasal Congestion (X 3 days. Neg covid test. Has been taking sudafed. States it has been helping very little)    History of Present Illness     Main symptom:nasal congestion. Says he has negative covid test. Has had some cough which is dry and non productive.   Began: Sunday.   Sick contacts:none  Fever: none   Sore throat: none   SOB: none   No gi symptoms.    The following portions of the patient's history were reviewed and updated as appropriate: allergies, current medications, past family history, past medical history, past social history, past surgical history, and problem list.    OBJECTIVE:  /80   Pulse 84   Temp 98.7 °F (37.1 °C)   Resp 16   Ht 177.8 cm (70\")   Wt 88.9 kg (196 lb)   SpO2 99%   BMI 28.12 kg/m²       Physical Exam  Constitutional:       General: He is not in acute distress.     Appearance: Normal appearance.   HENT:      Head: Normocephalic and atraumatic.      Right Ear: Tympanic membrane and external ear normal.      Left Ear: External ear normal. There is impacted cerumen.      Mouth/Throat:      Pharynx: No oropharyngeal exudate or posterior oropharyngeal erythema.   Eyes:      Extraocular Movements: Extraocular movements intact.   Cardiovascular:      Rate and Rhythm: Normal rate and regular rhythm.      Heart sounds: No murmur heard.  Pulmonary:      Effort: Pulmonary effort is normal. No respiratory distress.      Breath sounds: Normal breath sounds.   Abdominal:      General: Abdomen is flat.   Musculoskeletal:         General: No swelling.      Cervical back: Normal range of motion.   Skin:     Findings: No rash.   Neurological:      Mental Status: He is alert.   Psychiatric:         Mood and Affect: Mood normal.                    Assessment and Plan   Diagnoses and all orders for this visit:    1. Flu-like symptoms (Primary)  -     POCT Influenza A/B    2. Sinusitis, unspecified chronicity, unspecified location  Assessment & Plan:  Most likely viral. " Discussed to continue with supportive care including rest increased hydration and to eat. Tylenol for body aches and antihistamine for congestion.Told patient to go to er for any sob, cp, worsening symptoms. Call in nasal antihistamine.           Advised debrox from pharmacy for cerumen. Advised to call us if symptoms worsen may consider antibiotics.     Recheck potassium given high on last check.     Follow up for wellness.     He says he has been to neuro-opthalmology and neurosurgery. Says his vision is now clear. No new deficits. Says his mood is doing okay and he is still abstinent from alcohol.      Return in about 1 month (around 5/6/2022) for Annual.       Trina Feldman D.O.  [facility]

## 2022-04-06 NOTE — ASSESSMENT & PLAN NOTE
Most likely viral. Discussed to continue with supportive care including rest increased hydration and to eat. Tylenol for body aches and antihistamine for congestion.Told patient to go to er for any sob, cp, worsening symptoms. Call in nasal antihistamine.

## 2022-04-07 ENCOUNTER — TELEPHONE (OUTPATIENT)
Dept: FAMILY MEDICINE CLINIC | Facility: CLINIC | Age: 47
End: 2022-04-07

## 2022-04-07 NOTE — TELEPHONE ENCOUNTER
Caller: Louie Levy    Relationship: Self    Best call back number: [phone]    Requested Prescriptions:    ANTIHISTAMINE    Pharmacy where request should be sent: [id number] - [place] [address] AT [facility] [address] [facility] - [phone] [facility] [phone] FX     Additional details provided by patient: DURING OFFICE VISIT 4/6/22- DISCUSSED ANTIHISTAMINE- PHARMACY STATES THEY DO NOT HAVE A PRESCRIPTION FOR IT AT THIS TIME. PLEASE CALL IN TO PHARMACY.    Does the patient have less than a 3 day supply:  [x] Yes  [] No    Uzair Dong Rep   04/07/22 16:41 EDT

## 2022-04-08 RX ORDER — AZELASTINE 1 MG/ML
2 SPRAY, METERED NASAL 2 TIMES DAILY
Qty: 1 EACH | Refills: 1 | Status: ON HOLD | OUTPATIENT
Start: 2022-04-08 | End: 2023-04-01

## 2022-05-04 ENCOUNTER — OFFICE VISIT (OUTPATIENT)
Dept: FAMILY MEDICINE CLINIC | Facility: CLINIC | Age: 47
End: 2022-05-04

## 2022-05-04 VITALS
WEIGHT: 198 LBS | TEMPERATURE: 98.1 F | DIASTOLIC BLOOD PRESSURE: 86 MMHG | RESPIRATION RATE: 16 BRPM | BODY MASS INDEX: 28.35 KG/M2 | OXYGEN SATURATION: 98 % | HEART RATE: 65 BPM | SYSTOLIC BLOOD PRESSURE: 124 MMHG | HEIGHT: 70 IN

## 2022-05-04 DIAGNOSIS — Z00.00 ROUTINE ADULT HEALTH MAINTENANCE: ICD-10-CM

## 2022-05-04 DIAGNOSIS — Z00.00 ANNUAL PHYSICAL EXAM: Primary | ICD-10-CM

## 2022-05-04 DIAGNOSIS — F90.9 ATTENTION DEFICIT HYPERACTIVITY DISORDER (ADHD), UNSPECIFIED ADHD TYPE: ICD-10-CM

## 2022-05-04 PROCEDURE — 99396 PREV VISIT EST AGE 40-64: CPT | Performed by: STUDENT IN AN ORGANIZED HEALTH CARE EDUCATION/TRAINING PROGRAM

## 2022-05-04 NOTE — ASSESSMENT & PLAN NOTE
Annual  Colonoscopy: he declines at this time . Discussed this is to screen for colon cancer.   covid vaccine: given and booster.   Counseled on diet and exercise including limited sweets and sugars to focus on lean meat and vegetables. Counseled on 50 minutes of moderate exercise 3 times per week.   Dental exam: says he is up to date.   PSA normal at last check.   Eye exam has been done.   Had hiv and hep c screening, both negative.   Cholesterol and a1c have been screened in the past. He would like to recheck.   Screening for tobacco and alcohol use and he declines use.

## 2022-05-04 NOTE — PROGRESS NOTES
"Chief Complaint  Annual Exam    History of Present Illness    Annual  Colonoscopy: he declines at this time . Discussed this is to screen for colon cancer.   covid vaccine: given and booster.   Counseled on diet and exercise including limited sweets and sugars to focus on lean meat and vegetables. Counseled on 50 minutes of moderate exercise 3 times per week.   Dental exam: says he is up to date.   PSA normal at last check.   Eye exam has been done.   Had hiv and hep c screening, both negative.   Cholesterol and a1c have been screened in the past. He would like to recheck.   Screening for tobacco and alcohol use and he declines both.     He currently goes to [facility] and has follow up apt in June. He says he has some anxiety and has occasionally been taking his sons leftover stimulant which he says helps with anxiety. Advise against this. He used to be on stimulant medication for ADHD. He states he does not want prescription for adhd treatment today, but he says he is not happy with his current mental health clinic and would like to be referred to a different. His father recently was placed on hospice which is stressful. He declines medication management for depression/anxiety at this time. He denies any suicidal or homicidal thoughts. Advised to let me know if he needs anything.       The following portions of the patient's history were reviewed and updated as appropriate: allergies, current medications, past family history, past medical history, past social history, past surgical history, and problem list.    OBJECTIVE:  /86   Pulse 65   Temp 98.1 °F (36.7 °C)   Resp 16   Ht 177.8 cm (70\")   Wt 89.8 kg (198 lb)   SpO2 98%   BMI 28.41 kg/m²       Physical Exam  Constitutional:       General: He is not in acute distress.     Appearance: Normal appearance.   HENT:      Head: Normocephalic and atraumatic.   Eyes:      Extraocular Movements: Extraocular movements intact. "   Cardiovascular:      Rate and Rhythm: Normal rate and regular rhythm.      Heart sounds: No murmur heard.  Pulmonary:      Effort: Pulmonary effort is normal. No respiratory distress.      Breath sounds: Normal breath sounds.   Abdominal:      General: Abdomen is flat.   Musculoskeletal:         General: No swelling.      Cervical back: Normal range of motion.   Skin:     Findings: No rash.   Neurological:      General: No focal deficit present.      Mental Status: He is alert.   Psychiatric:         Mood and Affect: Mood normal.                    Assessment and Plan   Diagnoses and all orders for this visit:    1. Annual physical exam (Primary)  Assessment & Plan:  Annual  Colonoscopy: he declines at this time . Discussed this is to screen for colon cancer.   covid vaccine: given and booster.   Counseled on diet and exercise including limited sweets and sugars to focus on lean meat and vegetables. Counseled on 50 minutes of moderate exercise 3 times per week.   Dental exam: says he is up to date.   PSA normal when checked last.   Eye exam has been done.   Had hiv and hep c screening, both negative.   Cholesterol and a1c have been screened in the past. He would like to recheck.   Screening for tobacco and alcohol use was performed and he declines use of both.        2. Routine adult health maintenance  -     Hemoglobin A1c; Future  -     Lipid Panel; Future    3. Attention deficit hyperactivity disorder (ADHD), unspecified ADHD type  -     Ambulatory Referral to Behavioral Health  - See above    Discussed to make apt sooner if needed.    Return in about 6 months (around 11/4/2022).       Trina Feldman D.O.  [facility]

## 2022-05-25 ENCOUNTER — TELEPHONE (OUTPATIENT)
Dept: FAMILY MEDICINE CLINIC | Facility: CLINIC | Age: 47
End: 2022-05-25

## 2022-05-25 DIAGNOSIS — Z12.11 SCREENING FOR COLON CANCER: Primary | ICD-10-CM

## 2022-05-25 DIAGNOSIS — Z13.820 SCREENING FOR OSTEOPOROSIS: ICD-10-CM

## 2022-05-25 NOTE — TELEPHONE ENCOUNTER
PATIENT WOULD LIKE TO HAVE A REFERRAL FOR A COLONOSCOPY.     PATIENT WOULD ALSO LIKE A REFERRAL FOR A DEXA SCAN.      PLEASE CALL [phone]

## 2022-05-31 ENCOUNTER — APPOINTMENT (OUTPATIENT)
Dept: BONE DENSITY | Facility: HOSPITAL | Age: 47
End: 2022-05-31

## 2022-06-21 DIAGNOSIS — Z12.11 SCREENING FOR COLON CANCER: Primary | ICD-10-CM

## 2023-04-01 ENCOUNTER — APPOINTMENT (OUTPATIENT)
Dept: MRI IMAGING | Facility: HOSPITAL | Age: 48
DRG: 65 | End: 2023-04-01
Payer: COMMERCIAL

## 2023-04-01 ENCOUNTER — HOSPITAL ENCOUNTER (INPATIENT)
Facility: HOSPITAL | Age: 48
LOS: 2 days | Discharge: HOME | DRG: 65 | End: 2023-04-03
Attending: EMERGENCY MEDICINE | Admitting: INTERNAL MEDICINE
Payer: COMMERCIAL

## 2023-04-01 ENCOUNTER — APPOINTMENT (OUTPATIENT)
Dept: GENERAL RADIOLOGY | Facility: HOSPITAL | Age: 48
DRG: 65 | End: 2023-04-01
Payer: COMMERCIAL

## 2023-04-01 ENCOUNTER — APPOINTMENT (OUTPATIENT)
Dept: CT IMAGING | Facility: HOSPITAL | Age: 48
DRG: 65 | End: 2023-04-01
Payer: COMMERCIAL

## 2023-04-01 ENCOUNTER — APPOINTMENT (OUTPATIENT)
Dept: CARDIOLOGY | Facility: HOSPITAL | Age: 48
DRG: 65 | End: 2023-04-01
Payer: COMMERCIAL

## 2023-04-01 DIAGNOSIS — R27.0 ATAXIA: ICD-10-CM

## 2023-04-01 DIAGNOSIS — H53.2 DIPLOPIA: Primary | ICD-10-CM

## 2023-04-01 LAB
ALBUMIN SERPL-MCNC: 4.7 G/DL (ref 3.5–5.2)
ALBUMIN/GLOB SERPL: 1.5 G/DL
ALP SERPL-CCNC: 47 U/L (ref 39–117)
ALT SERPL W P-5'-P-CCNC: 21 U/L (ref 1–41)
ANION GAP SERPL CALCULATED.3IONS-SCNC: 11 MMOL/L (ref 5–15)
AST SERPL-CCNC: 20 U/L (ref 1–40)
BACTERIA UR QL AUTO: NORMAL /HPF
BASOPHILS # BLD AUTO: 0.05 10*3/MM3 (ref 0–0.2)
BASOPHILS NFR BLD AUTO: 0.8 % (ref 0–1.5)
BILIRUB SERPL-MCNC: 0.5 MG/DL (ref 0–1.2)
BILIRUB UR QL STRIP: NEGATIVE
BUN SERPL-MCNC: 12 MG/DL (ref 6–20)
BUN/CREAT SERPL: 15 (ref 7–25)
CALCIUM SPEC-SCNC: 9.5 MG/DL (ref 8.6–10.5)
CHLORIDE SERPL-SCNC: 103 MMOL/L (ref 98–107)
CLARITY UR: ABNORMAL
CO2 SERPL-SCNC: 26 MMOL/L (ref 22–29)
COLOR UR: YELLOW
CREAT SERPL-MCNC: 0.8 MG/DL (ref 0.76–1.27)
DEPRECATED RDW RBC AUTO: 43.3 FL (ref 37–54)
EGFRCR SERPLBLD CKD-EPI 2021: 109.8 ML/MIN/1.73
EOSINOPHIL # BLD AUTO: 0.02 10*3/MM3 (ref 0–0.4)
EOSINOPHIL NFR BLD AUTO: 0.3 % (ref 0.3–6.2)
ERYTHROCYTE [DISTWIDTH] IN BLOOD BY AUTOMATED COUNT: 12.7 % (ref 12.3–15.4)
ETHANOL BLD-MCNC: <10 MG/DL (ref 0–10)
GLOBULIN UR ELPH-MCNC: 3.2 GM/DL
GLUCOSE BLDC GLUCOMTR-MCNC: 89 MG/DL (ref 70–130)
GLUCOSE SERPL-MCNC: 113 MG/DL (ref 65–99)
GLUCOSE UR STRIP-MCNC: NEGATIVE MG/DL
HCT VFR BLD AUTO: 48.7 % (ref 37.5–51)
HGB BLD-MCNC: 16.3 G/DL (ref 13–17.7)
HGB UR QL STRIP.AUTO: NEGATIVE
HOLD SPECIMEN: NORMAL
HYALINE CASTS UR QL AUTO: NORMAL /LPF
IMM GRANULOCYTES # BLD AUTO: 0.02 10*3/MM3 (ref 0–0.05)
IMM GRANULOCYTES NFR BLD AUTO: 0.3 % (ref 0–0.5)
KETONES UR QL STRIP: NEGATIVE
LEUKOCYTE ESTERASE UR QL STRIP.AUTO: NEGATIVE
LYMPHOCYTES # BLD AUTO: 1.79 10*3/MM3 (ref 0.7–3.1)
LYMPHOCYTES NFR BLD AUTO: 29.3 % (ref 19.6–45.3)
MAGNESIUM SERPL-MCNC: 2.4 MG/DL (ref 1.6–2.6)
MCH RBC QN AUTO: 30.8 PG (ref 26.6–33)
MCHC RBC AUTO-ENTMCNC: 33.5 G/DL (ref 31.5–35.7)
MCV RBC AUTO: 92.1 FL (ref 79–97)
MONOCYTES # BLD AUTO: 0.49 10*3/MM3 (ref 0.1–0.9)
MONOCYTES NFR BLD AUTO: 8 % (ref 5–12)
NEUTROPHILS NFR BLD AUTO: 3.73 10*3/MM3 (ref 1.7–7)
NEUTROPHILS NFR BLD AUTO: 61.3 % (ref 42.7–76)
NITRITE UR QL STRIP: NEGATIVE
NRBC BLD AUTO-RTO: 0 /100 WBC (ref 0–0.2)
PH UR STRIP.AUTO: 7 [PH] (ref 5–8)
PLATELET # BLD AUTO: 280 10*3/MM3 (ref 140–450)
PMV BLD AUTO: 9.4 FL (ref 6–12)
POTASSIUM SERPL-SCNC: 4.3 MMOL/L (ref 3.5–5.2)
PROT SERPL-MCNC: 7.9 G/DL (ref 6–8.5)
PROT UR QL STRIP: NEGATIVE
QT INTERVAL: 408 MS
QTC INTERVAL: 427 MS
RBC # BLD AUTO: 5.29 10*6/MM3 (ref 4.14–5.8)
RBC # UR STRIP: NORMAL /HPF
REF LAB TEST METHOD: NORMAL
SODIUM SERPL-SCNC: 140 MMOL/L (ref 136–145)
SP GR UR STRIP: 1.04 (ref 1–1.03)
SQUAMOUS #/AREA URNS HPF: NORMAL /HPF
TROPONIN T SERPL HS-MCNC: <6 NG/L
UROBILINOGEN UR QL STRIP: ABNORMAL
WBC # UR STRIP: NORMAL /HPF
WBC NRBC COR # BLD: 6.1 10*3/MM3 (ref 3.4–10.8)
WHOLE BLOOD HOLD COAG: NORMAL
WHOLE BLOOD HOLD SPECIMEN: NORMAL

## 2023-04-01 PROCEDURE — 25010000002 ONDANSETRON PER 1 MG: Performed by: EMERGENCY MEDICINE

## 2023-04-01 PROCEDURE — 71045 X-RAY EXAM CHEST 1 VIEW: CPT

## 2023-04-01 PROCEDURE — 93306 TTE W/DOPPLER COMPLETE: CPT | Performed by: INTERNAL MEDICINE

## 2023-04-01 PROCEDURE — 82962 GLUCOSE BLOOD TEST: CPT

## 2023-04-01 PROCEDURE — 70496 CT ANGIOGRAPHY HEAD: CPT

## 2023-04-01 PROCEDURE — 80053 COMPREHEN METABOLIC PANEL: CPT | Performed by: EMERGENCY MEDICINE

## 2023-04-01 PROCEDURE — 70450 CT HEAD/BRAIN W/O DYE: CPT

## 2023-04-01 PROCEDURE — 25010000002 DIAZEPAM PER 5 MG: Performed by: EMERGENCY MEDICINE

## 2023-04-01 PROCEDURE — 70551 MRI BRAIN STEM W/O DYE: CPT

## 2023-04-01 PROCEDURE — 93306 TTE W/DOPPLER COMPLETE: CPT

## 2023-04-01 PROCEDURE — 81001 URINALYSIS AUTO W/SCOPE: CPT | Performed by: EMERGENCY MEDICINE

## 2023-04-01 PROCEDURE — 99223 1ST HOSP IP/OBS HIGH 75: CPT | Performed by: NURSE PRACTITIONER

## 2023-04-01 PROCEDURE — 82077 ASSAY SPEC XCP UR&BREATH IA: CPT | Performed by: NURSE PRACTITIONER

## 2023-04-01 PROCEDURE — 83735 ASSAY OF MAGNESIUM: CPT | Performed by: EMERGENCY MEDICINE

## 2023-04-01 PROCEDURE — 70498 CT ANGIOGRAPHY NECK: CPT

## 2023-04-01 PROCEDURE — 93005 ELECTROCARDIOGRAM TRACING: CPT | Performed by: EMERGENCY MEDICINE

## 2023-04-01 PROCEDURE — 99285 EMERGENCY DEPT VISIT HI MDM: CPT

## 2023-04-01 PROCEDURE — 84484 ASSAY OF TROPONIN QUANT: CPT | Performed by: EMERGENCY MEDICINE

## 2023-04-01 PROCEDURE — 99222 1ST HOSP IP/OBS MODERATE 55: CPT | Performed by: INTERNAL MEDICINE

## 2023-04-01 PROCEDURE — 85025 COMPLETE CBC W/AUTO DIFF WBC: CPT | Performed by: EMERGENCY MEDICINE

## 2023-04-01 PROCEDURE — 0042T HC CT CEREBRAL PERFUSION W/WO CONTRAST: CPT

## 2023-04-01 PROCEDURE — 25510000001 IOPAMIDOL PER 1 ML: Performed by: EMERGENCY MEDICINE

## 2023-04-01 RX ORDER — SODIUM CHLORIDE 0.9 % (FLUSH) 0.9 %
10 SYRINGE (ML) INJECTION EVERY 12 HOURS SCHEDULED
Status: DISCONTINUED | OUTPATIENT
Start: 2023-04-01 | End: 2023-04-03 | Stop reason: HOSPADM

## 2023-04-01 RX ORDER — SODIUM CHLORIDE 0.9 % (FLUSH) 0.9 %
10 SYRINGE (ML) INJECTION AS NEEDED
Status: DISCONTINUED | OUTPATIENT
Start: 2023-04-01 | End: 2023-04-03 | Stop reason: HOSPADM

## 2023-04-01 RX ORDER — ACETAMINOPHEN 160 MG/5ML
650 SOLUTION ORAL EVERY 4 HOURS PRN
Status: DISCONTINUED | OUTPATIENT
Start: 2023-04-01 | End: 2023-04-03 | Stop reason: HOSPADM

## 2023-04-01 RX ORDER — ASPIRIN 325 MG
325 TABLET ORAL DAILY
Status: DISCONTINUED | OUTPATIENT
Start: 2023-04-01 | End: 2023-04-02

## 2023-04-01 RX ORDER — CALCIUM CARBONATE 200(500)MG
1 TABLET,CHEWABLE ORAL 3 TIMES DAILY PRN
Status: DISCONTINUED | OUTPATIENT
Start: 2023-04-01 | End: 2023-04-03 | Stop reason: HOSPADM

## 2023-04-01 RX ORDER — DIAZEPAM 5 MG/ML
5 INJECTION, SOLUTION INTRAMUSCULAR; INTRAVENOUS ONCE
Status: COMPLETED | OUTPATIENT
Start: 2023-04-01 | End: 2023-04-01

## 2023-04-01 RX ORDER — SODIUM CHLORIDE 0.9 % (FLUSH) 0.9 %
10 SYRINGE (ML) INJECTION EVERY 12 HOURS SCHEDULED
Status: DISCONTINUED | OUTPATIENT
Start: 2023-04-01 | End: 2023-04-03

## 2023-04-01 RX ORDER — SODIUM CHLORIDE 9 MG/ML
40 INJECTION, SOLUTION INTRAVENOUS AS NEEDED
Status: DISCONTINUED | OUTPATIENT
Start: 2023-04-01 | End: 2023-04-03 | Stop reason: HOSPADM

## 2023-04-01 RX ORDER — ATORVASTATIN CALCIUM 40 MG/1
80 TABLET, FILM COATED ORAL NIGHTLY
Status: DISCONTINUED | OUTPATIENT
Start: 2023-04-01 | End: 2023-04-03 | Stop reason: HOSPADM

## 2023-04-01 RX ORDER — ONDANSETRON 2 MG/ML
4 INJECTION INTRAMUSCULAR; INTRAVENOUS EVERY 6 HOURS PRN
Status: DISCONTINUED | OUTPATIENT
Start: 2023-04-01 | End: 2023-04-03 | Stop reason: HOSPADM

## 2023-04-01 RX ORDER — SODIUM CHLORIDE 0.9 % (FLUSH) 0.9 %
10 SYRINGE (ML) INJECTION AS NEEDED
Status: DISCONTINUED | OUTPATIENT
Start: 2023-04-01 | End: 2023-04-03

## 2023-04-01 RX ORDER — ONDANSETRON 2 MG/ML
4 INJECTION INTRAMUSCULAR; INTRAVENOUS ONCE
Status: COMPLETED | OUTPATIENT
Start: 2023-04-01 | End: 2023-04-01

## 2023-04-01 RX ORDER — ACETAMINOPHEN 325 MG/1
650 TABLET ORAL EVERY 4 HOURS PRN
Status: DISCONTINUED | OUTPATIENT
Start: 2023-04-01 | End: 2023-04-03 | Stop reason: HOSPADM

## 2023-04-01 RX ORDER — ONDANSETRON 4 MG/1
4 TABLET, FILM COATED ORAL EVERY 6 HOURS PRN
Status: DISCONTINUED | OUTPATIENT
Start: 2023-04-01 | End: 2023-04-03 | Stop reason: HOSPADM

## 2023-04-01 RX ORDER — ACETAMINOPHEN 650 MG/1
650 SUPPOSITORY RECTAL EVERY 4 HOURS PRN
Status: DISCONTINUED | OUTPATIENT
Start: 2023-04-01 | End: 2023-04-03 | Stop reason: HOSPADM

## 2023-04-01 RX ORDER — ASPIRIN 300 MG/1
300 SUPPOSITORY RECTAL DAILY
Status: DISCONTINUED | OUTPATIENT
Start: 2023-04-01 | End: 2023-04-02

## 2023-04-01 RX ORDER — SODIUM CHLORIDE 9 MG/ML
40 INJECTION, SOLUTION INTRAVENOUS AS NEEDED
Status: DISCONTINUED | OUTPATIENT
Start: 2023-04-01 | End: 2023-04-03

## 2023-04-01 RX ADMIN — DIAZEPAM 5 MG: 5 INJECTION, SOLUTION INTRAMUSCULAR; INTRAVENOUS at 13:38

## 2023-04-01 RX ADMIN — IOPAMIDOL 125 ML: 755 INJECTION, SOLUTION INTRAVENOUS at 12:59

## 2023-04-01 RX ADMIN — ATORVASTATIN CALCIUM 80 MG: 40 TABLET, FILM COATED ORAL at 20:21

## 2023-04-01 RX ADMIN — ASPIRIN 325 MG: 325 TABLET ORAL at 13:26

## 2023-04-01 RX ADMIN — Medication 10 ML: at 20:22

## 2023-04-01 RX ADMIN — ONDANSETRON 4 MG: 2 INJECTION INTRAMUSCULAR; INTRAVENOUS at 13:39

## 2023-04-01 NOTE — Clinical Note
Level of Care: Telemetry [5]   Diagnosis: Diplopia [368.2.ICD-9-CM]   Admitting Physician: CHANTE WARD [[id number]]

## 2023-04-01 NOTE — ED PROVIDER NOTES
" EMERGENCY DEPARTMENT ENCOUNTER    Pt Name: Louie Levy  MRN: [id number]  Pt :   1975  Room Number:    Date of encounter:  2023  PCP: Trina Feldman DO  ED Provider: Te Marino MD    Historian: Patient and wife      HPI:  Chief Complaint: Double vision and imbalance        Context: Louie Levy is a 48 y.o. male who presents to the ED c/o symptoms which started roughly 24 hours prior to coming to the emergency department.  The patient notes imbalance with ataxia, stumbling to both sides when walking.  He also notes double vision.  His wife notes that his eyes have been going in different directions since yesterday evening.  No speech or swallowing abnormalities have been noted.  Symptoms are noted is moderate to severe.  Patient has not taken any medications for symptom relief.  The patient is status post \"brain bleeds\" in 2021.        PAST MEDICAL HISTORY  Past Medical History:   Diagnosis Date   • ADHD (attention deficit hyperactivity disorder)    • Closed fracture of left clavicle    • Mood disorder    • Seasonal allergies    • Tinnitus of both ears 2020         PAST SURGICAL HISTORY  Past Surgical History:   Procedure Laterality Date   • CLAVICLE SURGERY Left    • NASAL SEPTAL RECONSTRUCTION     • WISDOM TOOTH EXTRACTION           FAMILY HISTORY  Family History   Problem Relation Age of Onset   • Heart failure Mother    • Heart disease Father    • Skin cancer Father    • Heart failure Father    • No Known Problems Sister    • Heart disease Maternal Grandmother    • Heart failure Paternal Grandfather    • No Known Problems Sister    • No Known Problems Sister          SOCIAL HISTORY  Social History     Socioeconomic History   • Marital status:      Spouse name: Annetta   • Number of children: 1   • Years of education: College   • Highest education level: Bachelor's degree (e.g., BA, AB, BS)   Tobacco Use   • Smoking status: Never   • Smokeless tobacco: " Never   Vaping Use   • Vaping Use: Never used   Substance and Sexual Activity   • Alcohol use: Yes     Alcohol/week: 1.0 standard drink     Types: 1 Cans of beer per week   • Drug use: No   • Sexual activity: Yes     Partners: Female         ALLERGIES  Patient has no known allergies.        REVIEW OF SYSTEMS  Review of Systems       All systems reviewed and negative except for those discussed in HPI.       PHYSICAL EXAM    I have reviewed the triage vital signs and nursing notes.    ED Triage Vitals [04/01/23 1146]   Temp Heart Rate Resp BP SpO2   97.4 °F (36.3 °C) 73 18 (!) 137/102 98 %      Temp src Heart Rate Source Patient Position BP Location FiO2 (%)   Oral Monitor Sitting Left arm --       Physical Exam  GENERAL:   Appears in mild distress, keeping eyes closed throughout most of my time in the room.  HENT: Nares patent.  No facial droop  EYES: No scleral icterus.  Right eye with lateral movements but no movements past midline to the left.  Left eye tracks to the right but patient still complains of diplopia in the right visual field.  When the left eye crosses the midline nystagmus is noted which is quite significant.  CV: Regular rhythm, regular rate.  No carotid bruits.  No murmurs gallops rubs.  RESPIRATORY: Normal effort.  No audible wheezes, rales or rhonchi.  Clear to auscultation.  ABDOMEN: Soft, nontender  MUSCULOSKELETAL: No deformities.   NEURO: Alert, moves all extremities, follows commands.  No pronator drift or leg drift.  Fine touch sensation intact in upper and lower extremities.  See stroke navigator note for formal NIH  SKIN: Warm, dry, no rash visualized.      LAB RESULTS  Recent Results (from the past 24 hour(s))   Comprehensive Metabolic Panel    Collection Time: 04/01/23 12:00 PM    Specimen: Blood   Result Value Ref Range    Glucose 113 (H) 65 - 99 mg/dL    BUN 12 6 - 20 mg/dL    Creatinine 0.80 0.76 - 1.27 mg/dL    Sodium 140 136 - 145 mmol/L    Potassium 4.3 3.5 - 5.2 mmol/L     Chloride 103 98 - 107 mmol/L    CO2 26.0 22.0 - 29.0 mmol/L    Calcium 9.5 8.6 - 10.5 mg/dL    Total Protein 7.9 6.0 - 8.5 g/dL    Albumin 4.7 3.5 - 5.2 g/dL    ALT (SGPT) 21 1 - 41 U/L    AST (SGOT) 20 1 - 40 U/L    Alkaline Phosphatase 47 39 - 117 U/L    Total Bilirubin 0.5 0.0 - 1.2 mg/dL    Globulin 3.2 gm/dL    A/G Ratio 1.5 g/dL    BUN/Creatinine Ratio 15.0 7.0 - 25.0    Anion Gap 11.0 5.0 - 15.0 mmol/L    eGFR 109.8 >60.0 mL/min/1.73   Single High Sensitivity Troponin T    Collection Time: 04/01/23 12:00 PM    Specimen: Blood   Result Value Ref Range    HS Troponin T <6 <15 ng/L   Magnesium    Collection Time: 04/01/23 12:00 PM    Specimen: Blood   Result Value Ref Range    Magnesium 2.4 1.6 - 2.6 mg/dL   Green Top (Gel)    Collection Time: 04/01/23 12:00 PM   Result Value Ref Range    Extra Tube Hold for add-ons.    Lavender Top    Collection Time: 04/01/23 12:00 PM   Result Value Ref Range    Extra Tube hold for add-on    Gold Top - SST    Collection Time: 04/01/23 12:00 PM   Result Value Ref Range    Extra Tube Hold for add-ons.    Light Blue Top    Collection Time: 04/01/23 12:00 PM   Result Value Ref Range    Extra Tube Hold for add-ons.    CBC Auto Differential    Collection Time: 04/01/23 12:00 PM    Specimen: Blood   Result Value Ref Range    WBC 6.10 3.40 - 10.80 10*3/mm3    RBC 5.29 4.14 - 5.80 10*6/mm3    Hemoglobin 16.3 13.0 - 17.7 g/dL    Hematocrit 48.7 37.5 - 51.0 %    MCV 92.1 79.0 - 97.0 fL    MCH 30.8 26.6 - 33.0 pg    MCHC 33.5 31.5 - 35.7 g/dL    RDW 12.7 12.3 - 15.4 %    RDW-SD 43.3 37.0 - 54.0 fl    MPV 9.4 6.0 - 12.0 fL    Platelets 280 140 - 450 10*3/mm3    Neutrophil % 61.3 42.7 - 76.0 %    Lymphocyte % 29.3 19.6 - 45.3 %    Monocyte % 8.0 5.0 - 12.0 %    Eosinophil % 0.3 0.3 - 6.2 %    Basophil % 0.8 0.0 - 1.5 %    Immature Grans % 0.3 0.0 - 0.5 %    Neutrophils, Absolute 3.73 1.70 - 7.00 10*3/mm3    Lymphocytes, Absolute 1.79 0.70 - 3.10 10*3/mm3    Monocytes, Absolute 0.49 0.10 -  0.90 10*3/mm3    Eosinophils, Absolute 0.02 0.00 - 0.40 10*3/mm3    Basophils, Absolute 0.05 0.00 - 0.20 10*3/mm3    Immature Grans, Absolute 0.02 0.00 - 0.05 10*3/mm3    nRBC 0.0 0.0 - 0.2 /100 WBC   ECG 12 Lead ED Triage Standing Order; Weak / Dizzy / AMS    Collection Time: 04/01/23 12:12 PM   Result Value Ref Range    QT Interval 408 ms    QTC Interval 427 ms       If labs were ordered, I independently reviewed the results and considered them in treating the patient.        RADIOLOGY  CT Head Without Contrast    Result Date: 4/1/2023  CT HEAD WO CONTRAST Date of Exam: 4/1/2023 11:59 AM EDT Indication: DIZZINESS, BLURRY VISION, HX OF BLEEDS. Comparison: None available. Technique: Axial CT images were obtained of the head without contrast administration.  Reconstructed coronal and sagittal images were also obtained. Automated exposure control and iterative construction methods were used. Findings: There is no intracranial hemorrhage. There is no mass or midline shift. There is remote infarct in the right frontal lobe with encephalomalacia. There are no focal hypodensities to suggest acute or subacute infarct. The gray-white matter differentiation is intact. Brain volume is appropriate for patient's age. Mastoid air cells and paranasal sinuses are well aerated.     Impression: No acute intracranial abnormality on head CT. Remote right frontal infarct. MRI is more sensitive to evaluate for acute or subacute infarct. Electronically Signed: Ingrid Rubio  4/1/2023 12:14 PM EDT  Workstation ID: [id number]      I ordered and independently reviewed the above noted radiographic studies.      I viewed images of CT head which showed no acute bleed or definite ischemia per my independent interpretation.    See radiologist's dictation for official interpretation.        PROCEDURES    Critical Care  Performed by: Te Marino MD  Authorized by: Te Marino MD     Critical care provider statement:     Critical care time  (minutes):  35    Critical care time was exclusive of:  Separately billable procedures and treating other patients    Critical care was necessary to treat or prevent imminent or life-threatening deterioration of the following conditions:  CNS failure or compromise    Critical care was time spent personally by me on the following activities:  Ordering and performing treatments and interventions, ordering and review of laboratory studies, ordering and review of radiographic studies, pulse oximetry, re-evaluation of patient's condition, review of old charts, obtaining history from patient or surrogate, examination of patient, evaluation of patient's response to treatment, discussions with consultants and development of treatment plan with patient or surrogate  Comments:      I have evaluated and care for this patient under code stroke protocol.  The patient did not meet indication for IV thrombolysis given last known well greater than 4.5 hours.  Patient was evaluated for large vessel occlusion but Cath Lab with intervention was not indicated.  Patient was admitted for further care and observation to include MRI of the brain.        ECG 12 Lead ED Triage Standing Order; Weak / Dizzy / AMS   Final Result   Test Reason : ED Triage Standing Order~   Blood Pressure :   */*   mmHG   Vent. Rate :  66 BPM     Atrial Rate :  66 BPM      P-R Int : 154 ms          QRS Dur :  78 ms       QT Int : 408 ms       P-R-T Axes :  24  17  18 degrees      QTc Int : 427 ms      ** Poor data quality, interpretation may be adversely affected   Normal sinus rhythm   Normal ECG   No previous ECGs available   Confirmed by MARITZA PRIDE MD ([id number]) on 4/1/2023 12:14:48 PM      Referred By: ED MD           Confirmed By: MARITZA PRIDE MD          MEDICATIONS GIVEN IN ER    Medications   sodium chloride 0.9 % flush 10 mL (has no administration in time range)   aspirin tablet 325 mg (325 mg Oral Given 4/1/23 1326)     Or   aspirin suppository 300 mg (  Rectal Not Given:  See Alt 4/1/23 1326)   diazePAM (VALIUM) injection 5 mg (has no administration in time range)   ondansetron (ZOFRAN) injection 4 mg (has no administration in time range)   iopamidol (ISOVUE-370) 76 % injection 125 mL (125 mL Intravenous Given 4/1/23 1259)         MEDICAL DECISION MAKING, PROGRESS, and CONSULTS    All labs have been independently reviewed by me.  All radiology studies have been reviewed by me and the radiologist dictating the report.  All EKG's have been independently viewed and interpreted by me.      Discussion below represents my analysis of pertinent findings related to patient's condition, differential diagnosis, treatment plan and final disposition.      Differential diagnosis:    CVA versus brain lesion versus intracranial hemorrhage, etc.      Additional sources:    - Discussed/ obtained information from independent historians: Patient's wife is a good historian    - External (non-ED) record review: Epic chart review    - Chronic or social conditions impacting care: Prior traumatic brain injury.    - Shared decision making: Patient, wife are in agreement with current plan for treatment and admission.      Orders placed during this visit:  Orders Placed This Encounter   Procedures   • XR Chest 1 View   • CT Head Without Contrast   • CT Angiogram Head w AI Analysis of LVO   • CT Angiogram Neck   • CT CEREBRAL PERFUSION WITH & WITHOUT CONTRAST   • [place] Draw   • Comprehensive Metabolic Panel   • Single High Sensitivity Troponin T   • Magnesium   • Urinalysis With Microscopic If Indicated (No Culture) - Urine, Clean Catch   • CBC Auto Differential   • NPO Diet NPO Type: Strict NPO   • Undress & Gown   • Cardiac Monitoring   • Continuous Pulse Oximetry   • Vital Signs   • Orthostatic Blood Pressure   • Nursing Dysphagia Screening (Complete Prior to Giving Anything By Mouth)   • Oxygen Therapy- Nasal Cannula; 2 LPM; Titrate for SPO2: 92%, Greater Than or Equal To   • POC  Glucose Once   • ECG 12 Lead ED Triage Standing Order; Weak / Dizzy / AMS   • Insert Peripheral IV   • Inpatient Admission   • ED Bed Request   • Fall Precautions   • CBC & Differential   • Green Top (Gel)   • Lavender Top   • Gold Top - SST   • Gray Top   • Light Blue Top         Additional orders considered but not ordered:  MRI brain can be ordered by the admitting team but was considered by myself.    ED Course:    Consultants:                      AS OF 13:33 EDT VITALS:    BP - 125/70  HR - 72  TEMP - 97.4 °F (36.3 °C) (Oral)  O2 SATS - 100%                  DIAGNOSIS  Final diagnoses:   Diplopia   Ataxia         DISPOSITION  Admission      Please note that portions of this document were completed with voice recognition software.      Te Marino MD  04/06/23 7500

## 2023-04-01 NOTE — CONSULTS
Stroke Consult Note    Patient Name: Louie Levy   MRN: [id number]  Age: 48 y.o.  Sex: male  : 1975    Primary Care Physician: Trina Feldman DO  Referring Physician: Ned MENDEZ STROKE TEAM CALLED: 1230 EST     TIME PATIENT SEEN: 1230 EST    Handedness: Right  Race:     Chief Complaint/Reason for Consultation: Dizziness, blurred vision, diplopia  HPI: Louie Levy is a 48-year-old male with a PMH significant for epidural hematoma, occipital condyle fracture, subdural hematoma, traumatic subarachnoid hemorrhage, EtOH use, ADHD, depression, and tinnitus who presents to [facility] ED with complaints of dizziness, gait instability, blurred vision, and double vision that began approximately 1400 yesterday.  His wife reports that he has had a disconjugate gaze since that time.  Symptoms persisted today, came to [facility] for further evaluation.    BP on arrival to [facility] was 137/102.  NIH 2 for partial gaze deviation and difficulty detecting finger movement in the left superior visual field.  Patient noted to have disconjugate gaze.  Nystagmus noted.  Right eye is unable to cross midline.  CT head with no acute findings.  Encephalomalacia noted in the right frontal lobe.  CTP with no evidence of LVO.  CTA H/N with no flow-limiting stenosis, occlusion, or aneurysm noted.      Of note, the patient states that he had these deficits when he initially had his head injury and head bleeds in .  He reports that all symptoms had improved since that time until yesterday. He will be admitted for further evaluation.    Last Known Normal Date/Time: 1400 3/31/2023 EST     Review of Systems   Constitutional: Negative for chills and fever.   HENT: Positive for congestion. Negative for trouble swallowing.    Eyes: Positive for photophobia and visual disturbance.   Respiratory: Negative for cough and shortness of breath.    Cardiovascular: Negative for chest pain and palpitations.   Gastrointestinal: Negative for nausea and  vomiting.   Musculoskeletal: Positive for gait problem.   Neurological: Positive for dizziness. Negative for facial asymmetry, weakness and numbness.      Past Medical History:   Diagnosis Date   • ADHD (attention deficit hyperactivity disorder)    • Closed fracture of left clavicle    • Mood disorder    • Seasonal allergies    • Tinnitus of both ears 9/8/2020     Past Surgical History:   Procedure Laterality Date   • CLAVICLE SURGERY Left 2015   • NASAL SEPTAL RECONSTRUCTION  1992   • WISDOM TOOTH EXTRACTION  1990     Family History   Problem Relation Age of Onset   • Heart failure Mother    • Heart disease Father    • Skin cancer Father    • Heart failure Father    • No Known Problems Sister    • Heart disease Maternal Grandmother    • Heart failure Paternal Grandfather    • No Known Problems Sister    • No Known Problems Sister      Social History     Socioeconomic History   • Marital status:      Spouse name: Annetta   • Number of children: 1   • Years of education: College   • Highest education level: Bachelor's degree (e.g., BA, AB, BS)   Tobacco Use   • Smoking status: Never   • Smokeless tobacco: Never   Vaping Use   • Vaping Use: Never used   Substance and Sexual Activity   • Alcohol use: Yes     Alcohol/week: 1.0 standard drink     Types: 1 Cans of beer per week   • Drug use: No   • Sexual activity: Yes     Partners: Female     No Known Allergies  Prior to Admission medications    Medication Sig Start Date End Date Taking? Authorizing Provider   amoxicillin (AMOXIL) 500 MG capsule Take 2 capsules by mouth 2 (Two) Times a Day. 1/23/23   Vinicio Menon MD   azelastine (ASTELIN) 0.1 % nasal spray 2 sprays into the nostril(s) as directed by provider 2 (Two) Times a Day. Use in each nostril as directed 4/8/22   Trina Feldman DO   DayVigo 5 MG tablet Take 1 tablet by mouth every night at bedtime. 2/28/22   Grecia King MD   Krill Oil 500 MG capsule  5/1/20   Grecia King MD    Multiple Vitamins-Minerals (EQ COMPLETE MULTIVITAMIN-ADULT) tablet  11/1/18   Provider, MD Grecia   Sod Picosulfate-Mag Ox-Cit Acd 10-3.5-12 MG-GM -GM/160ML solution Take 160 mL by mouth Take As Directed for 2 doses. 6/21/22   Nisha Dockery APRN         Temp:  [97.4 °F (36.3 °C)] 97.4 °F (36.3 °C)  Heart Rate:  [73] 73  Resp:  [18] 18  BP: (137)/(102) 137/102  Neurological Exam  Mental Status  Awake and alert. Oriented to person, place, time and situation. Oriented to person, place, and time. Speech is normal. Language is fluent with no aphasia.    Cranial Nerves  CN II: Vision test: Disconjugate gaze. Difficulty detecting finger movement in his left inner superior visual field.  CN III, IV, VI: Nystagmus present: Horizontal. Disconjugate gaze.  Right eye is unable to cross midline..  CN V: Facial sensation is normal.  CN VII: Full and symmetric facial movement.  CN IX, X: Palate elevates symmetrically  CN XI: Shoulder shrug strength is normal.  CN XII: Tongue midline without atrophy or fasciculations.    Motor   Strength is 5/5 throughout all four extremities.    Sensory  Light touch is normal in upper and lower extremities.     Reflexes                                            Right                      Left  Plantar                           Downgoing                Downgoing    Coordination  Right: Finger-to-nose normal.Left: Finger-to-nose normal.    Gait  Casual gait is normal including stance, stride, and arm swing.      Physical Exam  Constitutional:       General: He is awake. He is not in acute distress.  HENT:      Head: Normocephalic and atraumatic.   Eyes:      Extraocular Movements: Nystagmus present.      Conjunctiva/sclera: Conjunctivae normal.      Comments: Disconjugate gaze   Cardiovascular:      Rate and Rhythm: Normal rate and regular rhythm.   Pulmonary:      Effort: Pulmonary effort is normal. No respiratory distress.   Abdominal:      General: There is no distension.       Palpations: Abdomen is soft.   Musculoskeletal:      Cervical back: Normal range of motion and neck supple.      Right lower leg: No edema.      Left lower leg: No edema.   Skin:     General: Skin is warm and dry.      Capillary Refill: Capillary refill takes less than 2 seconds.   Neurological:      Mental Status: He is alert and oriented to person, place, and time.      Cranial Nerves: Cranial nerve deficit present.      Sensory: No sensory deficit.      Motor: Motor strength is normal. No weakness.      Coordination: Coordination normal.   Psychiatric:         Speech: Speech normal.         Acute Stroke Data    Thrombolytic Inclusion / Exclusion Criteria    Time: 1230EDT  Person Administering Scale: SAKINA Nayak    Inclusion Criteria  [x]   18 years of age or greater   []   Onset of symptoms < 4.5 hours before beginning treatment (stroke onset = time patient was last seen well or without symptoms).   []   Diagnosis of acute ischemic stroke causing measurable disabling deficit (Complete Hemianopia, Any Aphasia, Visual or Sensory Extinction, Any weakness limiting sustained effort against gravity)   []   Any remaining deficit considered potentially disabling in view of patient and practitioner   Exclusion criteria (Do not proceed with Alteplase if any are checked under exclusion criteria)  [x]   Onset unknown or GREATER than 4.5 hours   []   ICH on CT/MRI   []   CT demonstrates hypodensity representing acute or subacute infarct   []   Significant head trauma or prior stroke in the previous 3 months   []   Symptoms suggestive of subarachnoid hemorrhage   []   History of un-ruptured intracranial aneurysm GREATER than 10 mm   []   Recent intracranial or intraspinal surgery within the last 3 months   []   Arterial puncture at a non-compressible site in the previous 7 days   []   Active internal bleeding   []   Acute bleeding tendency   []   Platelet count LESS than 100,000 for known hematological diseases  such as leukemia, thrombocytopenia or chronic cirrhosis   []   Current use of anticoagulant with INR GREATER than 1.7 or PT GREATER than 15 seconds, aPTT GREATER than 40 seconds   []   Heparin received within 48 hours, resulting in abnormally elevated aPTT GREATER than upper limit of normal   []   Current use of direct thrombin inhibitors or direct factor Xa inhibitors in the past 48 hours   []   Elevated blood pressure refractory to treatment (systolic GREATER than 185 mm/Hg or diastolic  GREATER than 110 mm/Hg   []   Suspected infective endocarditis and aortic arch dissection   []   Current use of therapeutic treatment dose of low-molecular-weight heparin (LMWH) within the previous 24 hours   []   Structural GI malignancy or bleed   Relative exclusion for all patients  []   Only minor non-disabling symptoms   []   Pregnancy   []   Seizure at onset with postictal residual neurological impairments   []   Major surgery or previous trauma within past 14 days   []   History of previous spontaneous ICH, intracranial neoplasm, or AV malformation   []   Postpartum (within previous 14 days)   []   Recent GI or urinary tract hemorrhage (within previous 21 days)   []   Recent acute MI (within previous 3 months)   []   History of un-ruptured intracranial aneurysm LESS than 10 mm   []   History of ruptured intracranial aneurysm   []   Blood glucose LESS than 50 mg/dL (2.7 mmol/L)   []   Dural puncture within the last 7 days   []   Known GREATER than 10 cerebral microbleeds   Additional exclusions for patients with symptoms onset between 3 and 4.5 hours.  []   Age > 80.   []   On any anticoagulants regardless of INR  >>> Warfarin (Coumadin), Heparin, Enoxaparin (Lovenox), fondaparinux (Arixtra), bivalirudin (Angiomax), Argatroban, dabigatran (Pradaxa), rivaroxaban (Xarelto), or apixaban (Eliquis)   []   Severe stroke (NIHSS > 25).   []   History of BOTH diabetes and previous ischemic stroke.   []   The risks and benefits have  been discussed with the patient or family related to the administration of IV thrombolytic therapy for stroke symptoms.   []   I have discussed and reviewed the patient's case and imaging with the attending prior to IV thrombolytic therapy.    Time IV thrombolytic administered       Hospital Meds:  Scheduled-    Infusions-     PRNs- •  sodium chloride    Functional Status Prior to Current Stroke/[place] Score: 0    NIH Stroke Scale  Time: 1230  Person Administering Scale: SAKINA Nayak    Interval: baseline  1a. Level of Consciousness: 0-->Alert, keenly responsive  1b. LOC Questions: 0-->Answers both questions correctly  1c. LOC Commands: 0-->Performs both tasks correctly  2. Best Gaze: 1-->Partial gaze palsy, gaze is abnormal in one or both eyes, but forced deviation or total gaze paresis is not present  3. Visual: 1-->Partial hemianopia  4. Facial Palsy: 0-->Normal symmetrical movements  5a. Motor Arm, Left: 0-->No drift, limb holds 90 (or 45) degrees for full 10 secs  5b. Motor Arm, Right: 0-->No drift, limb holds 90 (or 45) degrees for full 10 secs  6a. Motor Leg, Left: 0-->No drift, leg holds 30 degree position for full 5 secs  6b. Motor Leg, Right: 0-->No drift, leg holds 30 degree position for full 5 secs  7. Limb Ataxia: 0-->Absent  8. Sensory: 0-->Normal, no sensory loss  9. Best Language: 0-->No aphasia, normal  10. Dysarthria: 0-->Normal  11. Extinction and Inattention (formerly Neglect): 0-->No abnormality    Total (NIH Stroke Scale): 2        Results Reviewed:  I have personally reviewed current lab, radiology, and data    CT Head Without Contrast    Result Date: 4/1/2023  Impression: No acute intracranial abnormality on head CT. Remote right frontal infarct. MRI is more sensitive to evaluate for acute or subacute infarct. Electronically Signed: Ingrid Rubio  4/1/2023 12:14 PM EDT  Workstation ID: [id number]    CT Angiogram Neck    Result Date: 4/1/2023  Impression: CT perfusion demonstrates  expected deficits relating to area of volume loss in the right inferior frontal lobe. There is otherwise no evidence of acute core infarct or territorial ischemic tissue at risk. Essentially normal CT angiogram of the head and neck without evidence of flow-limiting stenosis, large vessel occlusion or aneurysmal dilatation. Electronically Signed: oY Gil  4/1/2023 1:33 PM EDT  Workstation ID: [id number]    XR Chest 1 View    Result Date: 4/1/2023  Impression: 1.No acute pulmonary process. Electronically Signed: Esauconi Padillataiwo  4/1/2023 1:37 PM EDT  Workstation ID: [id number]    CT Angiogram Head w AI Analysis of LVO    Result Date: 4/1/2023  Impression: CT perfusion demonstrates expected deficits relating to area of volume loss in the right inferior frontal lobe. There is otherwise no evidence of acute core infarct or territorial ischemic tissue at risk. Essentially normal CT angiogram of the head and neck without evidence of flow-limiting stenosis, large vessel occlusion or aneurysmal dilatation. Electronically Signed: Yo Gil  4/1/2023 1:33 PM EDT  Workstation ID: [id number]    CT CEREBRAL PERFUSION WITH & WITHOUT CONTRAST    Result Date: 4/1/2023  Impression: CT perfusion demonstrates expected deficits relating to area of volume loss in the right inferior frontal lobe. There is otherwise no evidence of acute core infarct or territorial ischemic tissue at risk. Essentially normal CT angiogram of the head and neck without evidence of flow-limiting stenosis, large vessel occlusion or aneurysmal dilatation. Electronically Signed: Yo Gil  4/1/2023 1:33 PM EDT  Workstation ID: [id number]    WBC   Date Value Ref Range Status   04/01/2023 6.10 3.40 - 10.80 10*3/mm3 Final     RBC   Date Value Ref Range Status   04/01/2023 5.29 4.14 - 5.80 10*6/mm3 Final     Hemoglobin   Date Value Ref Range Status   04/01/2023 16.3 13.0 - 17.7 g/dL Final     Hematocrit   Date Value Ref Range Status   04/01/2023 48.7 37.5 - 51.0 %  Final     MCV   Date Value Ref Range Status   04/01/2023 92.1 79.0 - 97.0 fL Final     MCH   Date Value Ref Range Status   04/01/2023 30.8 26.6 - 33.0 pg Final     MCHC   Date Value Ref Range Status   04/01/2023 33.5 31.5 - 35.7 g/dL Final     RDW   Date Value Ref Range Status   04/01/2023 12.7 12.3 - 15.4 % Final     RDW-SD   Date Value Ref Range Status   04/01/2023 43.3 37.0 - 54.0 fl Final     MPV   Date Value Ref Range Status   04/01/2023 9.4 6.0 - 12.0 fL Final     Platelets   Date Value Ref Range Status   04/01/2023 280 140 - 450 10*3/mm3 Final     Neutrophil %   Date Value Ref Range Status   04/01/2023 61.3 42.7 - 76.0 % Final     Lymphocyte %   Date Value Ref Range Status   04/01/2023 29.3 19.6 - 45.3 % Final     Monocyte %   Date Value Ref Range Status   04/01/2023 8.0 5.0 - 12.0 % Final     Eosinophil %   Date Value Ref Range Status   04/01/2023 0.3 0.3 - 6.2 % Final     Basophil %   Date Value Ref Range Status   04/01/2023 0.8 0.0 - 1.5 % Final     Immature Grans %   Date Value Ref Range Status   04/01/2023 0.3 0.0 - 0.5 % Final     Neutrophils, Absolute   Date Value Ref Range Status   04/01/2023 3.73 1.70 - 7.00 10*3/mm3 Final     Lymphocytes, Absolute   Date Value Ref Range Status   04/01/2023 1.79 0.70 - 3.10 10*3/mm3 Final     Monocytes, Absolute   Date Value Ref Range Status   04/01/2023 0.49 0.10 - 0.90 10*3/mm3 Final     Eosinophils, Absolute   Date Value Ref Range Status   04/01/2023 0.02 0.00 - 0.40 10*3/mm3 Final     Basophils, Absolute   Date Value Ref Range Status   04/01/2023 0.05 0.00 - 0.20 10*3/mm3 Final     Immature Grans, Absolute   Date Value Ref Range Status   04/01/2023 0.02 0.00 - 0.05 10*3/mm3 Final     nRBC   Date Value Ref Range Status   04/01/2023 0.0 0.0 - 0.2 /100 WBC Final     Lab Results   Component Value Date    GLUCOSE 113 (H) 04/01/2023    BUN 12 04/01/2023    CREATININE 0.80 04/01/2023    EGFR 109.8 04/01/2023    BCR 15.0 04/01/2023    K 4.3 04/01/2023    CO2 26.0  04/01/2023    CALCIUM 9.5 04/01/2023    PROTENTOTREF 7.2 08/26/2019    ALBUMIN 4.7 04/01/2023    BILITOT 0.5 04/01/2023    AST 20 04/01/2023    ALT 21 04/01/2023       Assessment/Plan:    48-year-old male with a PMH significant for epidural hematoma, occipital condyle fracture, subdural hematoma, traumatic subarachnoid hemorrhage, EtOH use, ADHD, depression, and tinnitus who presents to [facility] ED with complaints of dizziness, gait instability, blurred vision, and double vision that began approximately 1400 yesterday.  His wife reports that he has had a disconjugate gaze since that time.  Symptoms persisted today, came to [facility] for further evaluation.  NIH 2 on arrival.  CT head negative for any acute findings.  He is not a candidate for TNKase secondary to last known well greater than 4.5 hours.    Advanced imaging:  -CT head with no acute findings. Encephalomalacia noted in the right frontal lobe.    -CTP with no evidence of LVO.    -CTA H/N with no flow-limiting stenosis, occlusion, or aneurysm noted.      Antiplatelet PTA: None   anticoagulant PTA: None      Dizziness, disconjugate gaze  -History of previous posterior brain injury and multiple traumatic bleeds in 2021.  -NIH 2  -MRI brain with thin slices of the brainstem pending  -Aspirin  -High-dose statin  -Hemoglobin  A1c, lipid panel in a.m.  -Activity as tolerated  -Fall precautions  -N.p.o. unless bedside dysphagia is passed  -PT/OT/SLP  -Discussed with Dr. Dr. Hernandez, Dr. Marino, the patient, his wife, and nursing staff.  Stroke neurology will continue to follow.  Please call with any questions or concerns.    SAKINA Nayak, AGACNP-BC  April 1, 2023  12:56 EDT

## 2023-04-01 NOTE — H&P
[facility]  HISTORY AND PHYSICAL    Patient Name: Louie Levy  : 1975  MRN: [id number]  Primary Care Physician: Trina Feldman DO  Date of admission: 2023      Subjective   Subjective     Chief Complaint:  Blurry and double vision    HPI:  Louie Levy is a 48 y.o. male with past medical history significant for occipital fracture, subdural hematoma, subarachnoid hematoma all secondary to trauma, epidural hematoma.  Patient also has history of EtOH use and also ADHD.  He also has depression and tinnitus.  Evidently patient was in his usual state of health until yesterday afternoon when he noticed abnormalities in his vision and some pressure in his ears and around his eyes.  As the day progressed the symptoms change and patient noticed that he has blurry vision and double vision.  When he went back home his wife noticed that he has disconjugate gaze.  As the symptoms continued patient came to the emergency room today.  CT scan of the head does not show any acute process.  MRI has not been done yet but is ordered by stroke team.  Patient denies any fever or chills or any sign of illness prior to the onset of symptoms yesterday.      Review of Systems   Denies any weight loss or weight gain recently, no lumps or bumps, no unusual headaches, visual changes as mentioned above, no chest pain or shortness of breath, no nausea vomiting or diarrhea, no rashes or hives.    Personal History     Past Medical History:   Diagnosis Date   • ADHD (attention deficit hyperactivity disorder)    • Closed fracture of left clavicle    • Mood disorder    • Seasonal allergies    • Tinnitus of both ears 2020             Past Surgical History:   Procedure Laterality Date   • CLAVICLE SURGERY Left    • NASAL SEPTAL RECONSTRUCTION     • WISDOM TOOTH EXTRACTION         Family History: family history includes Heart disease in his father and maternal grandmother;  Heart failure in his father, mother, and paternal grandfather; No Known Problems in his sister, sister, and sister; Skin cancer in his father.     Social History:  reports that he has never smoked. He has never used smokeless tobacco. He reports current alcohol use of about 1.0 standard drink per week. He reports that he does not use drugs.  Social History     Social History Narrative   • Not on file       Medications:  Available home medication information reviewed.  Medications Prior to Admission   Medication Sig Dispense Refill Last Dose   • lisdexamfetamine (Vyvanse) 50 MG capsule Take 1 capsule by mouth Every Morning   3/31/2023 at 0800       No Known Allergies    Objective   Objective     Vital Signs:   Temp:  [97.4 °F (36.3 °C)-98 °F (36.7 °C)] 98 °F (36.7 °C)  Heart Rate:  [61-82] 68  Resp:  [18] 18  BP: (120-147)/() 123/86  Total (NIH Stroke Scale): 2    Physical Exam   Constitutional: No acute distress  HENT: NCAT, mucous membranes moist.  Blurry vision, diplopia, disconjugate gaze  Respiratory: Clear to auscultation bilaterally, respiratory effort normal   Cardiovascular: RRR, no murmurs, rubs, or gallops  Gastrointestinal: Positive bowel sounds, soft, nontender, nondistended  Musculoskeletal: No bilateral ankle edema  Psychiatric: Appropriate affect, cooperative  Neurologic: Oriented x 3, strength symmetric in all extremities, speech clear  Skin: No rashes    Result Review:  I have personally reviewed the results from the time of this admission to 4/1/2023 19:00 EDT and agree with these findings:  [x]  Laboratory list / accordion  []  Microbiology  [x]  Radiology  []  EKG/Telemetry   []  Cardiology/Vascular   []  Pathology  []  Old records  []  Other:  Most notable findings include: No acute process on CT scan.  Labs are bland.        LAB RESULTS:      Lab 04/01/23  1200   WBC 6.10   HEMOGLOBIN 16.3   HEMATOCRIT 48.7   PLATELETS 280   NEUTROS ABS 3.73   IMMATURE GRANS (ABS) 0.02   LYMPHS ABS 1.79    MONOS ABS 0.49   EOS ABS 0.02   MCV 92.1         Lab 04/01/23  1200   SODIUM 140   POTASSIUM 4.3   CHLORIDE 103   CO2 26.0   ANION GAP 11.0   BUN 12   CREATININE 0.80   EGFR 109.8   GLUCOSE 113*   CALCIUM 9.5   MAGNESIUM 2.4         Lab 04/01/23  1200   TOTAL PROTEIN 7.9   ALBUMIN 4.7   GLOBULIN 3.2   ALT (SGPT) 21   AST (SGOT) 20   BILIRUBIN 0.5   ALK PHOS 47         Lab 04/01/23  1200   HSTROP T <6                     Microbiology Results (last 10 days)     ** No results found for the last 240 hours. **          CT Head Without Contrast    Result Date: 4/1/2023  CT HEAD WO CONTRAST Date of Exam: 4/1/2023 11:59 AM EDT Indication: DIZZINESS, BLURRY VISION, HX OF BLEEDS. Comparison: None available. Technique: Axial CT images were obtained of the head without contrast administration.  Reconstructed coronal and sagittal images were also obtained. Automated exposure control and iterative construction methods were used. Findings: There is no intracranial hemorrhage. There is no mass or midline shift. There is remote infarct in the right frontal lobe with encephalomalacia. There are no focal hypodensities to suggest acute or subacute infarct. The gray-white matter differentiation is intact. Brain volume is appropriate for patient's age. Mastoid air cells and paranasal sinuses are well aerated.     Impression: Impression: No acute intracranial abnormality on head CT. Remote right frontal infarct. MRI is more sensitive to evaluate for acute or subacute infarct. Electronically Signed: Ingrid Rubio  4/1/2023 12:14 PM EDT  Workstation ID: [id number]    CT Angiogram Neck    Result Date: 4/1/2023  CT CEREBRAL PERFUSION W WO CONTRAST, CT ANGIOGRAM NECK, CT ANGIOGRAM HEAD W AI ANALYSIS OF LVO Date of Exam: 4/1/2023 12:33 PM EDT Indication: Neuro deficit, acute, stroke suspected.  Comparison: None available. Technique: Axial CT images of the brain were obtained prior to and after the administration of 125 mL Isovue-370. Core blood  volume, core blood flow, mean transit time, and Tmax images were obtained utilizing the Rapid software protocol. A limited CT angiogram of the head was also performed to measure the blood vessel density. Axial IV arterial phase contrast-enhanced CT angiogram of the head and neck. Three-dimensional reconstructions were postprocessed. The radiation dose reduction device was turned on for each scan per the ALARA (As Low as Reasonably Achievable) protocol. Findings: CT perfusion: Matched defects are present correlating to a right inferior frontal chronic infarct. There is otherwise no evidence of acute territorial infarct or significant territorial ischemic tissue at risk. CT ANGIOGRAM: The lung apices are clear. Evaluation of the neck soft tissues demonstrates no pathologic cervical lymphadenopathy or unexpected aerodigestive tract mass. The thyroid is homogeneous. The osseous structures demonstrate multilevel spondylosis, without evidence of fracture or aggressive osseous lesion. Three-vessel aortic arch with patent origins. The subclavian arteries are partially visualized and appear patent. On the right, 0% stenosis is present involving the ICA origin by NASCET criteria, with small amount of calcified atherosclerotic change present. 0% narrowing is present on left. The vertebral arteries are normal in course and caliber bilaterally. Intracranially, the carotid siphons demonstrate no significant atherosclerotic narrowing. The anterior cerebral arteries are normal in course and caliber bilaterally. The right middle cerebral artery demonstrates no evidence of flow-limiting stenosis, large vessel occlusion or aneurysmal dilatation. The left middle cerebral artery similarly normal in course and caliber. The vertebrobasilar system is patent. The posterior cerebral arteries are normal in course and caliber bilaterally.     Impression: Impression: CT perfusion demonstrates expected deficits relating to area of volume loss in  the right inferior frontal lobe. There is otherwise no evidence of acute core infarct or territorial ischemic tissue at risk. Essentially normal CT angiogram of the head and neck without evidence of flow-limiting stenosis, large vessel occlusion or aneurysmal dilatation. Electronically Signed: Yo Gil  4/1/2023 1:33 PM EDT  Workstation ID: [id number]    XR Chest 1 View    Result Date: 4/1/2023  XR CHEST 1 VW Date of Exam: 4/1/2023 12:56 PM EDT Indication: Weak/Dizzy/AMS triage protocol. Comparison: None available. Findings: The heart is not enlarged. The lungs seem clear. There are no pleural effusions. There is a screw-plate device noted along the left clavicle.     Impression: Impression: 1.No acute pulmonary process. Electronically Signed: Esau Padillataiwo  4/1/2023 1:37 PM EDT  Workstation ID: [id number]    CT Angiogram Head w AI Analysis of LVO    Result Date: 4/1/2023  CT CEREBRAL PERFUSION W WO CONTRAST, CT ANGIOGRAM NECK, CT ANGIOGRAM HEAD W AI ANALYSIS OF LVO Date of Exam: 4/1/2023 12:33 PM EDT Indication: Neuro deficit, acute, stroke suspected.  Comparison: None available. Technique: Axial CT images of the brain were obtained prior to and after the administration of 125 mL Isovue-370. Core blood volume, core blood flow, mean transit time, and Tmax images were obtained utilizing the Rapid software protocol. A limited CT angiogram of the head was also performed to measure the blood vessel density. Axial IV arterial phase contrast-enhanced CT angiogram of the head and neck. Three-dimensional reconstructions were postprocessed. The radiation dose reduction device was turned on for each scan per the ALARA (As Low as Reasonably Achievable) protocol. Findings: CT perfusion: Matched defects are present correlating to a right inferior frontal chronic infarct. There is otherwise no evidence of acute territorial infarct or significant territorial ischemic tissue at risk. CT ANGIOGRAM: The lung apices are clear.  Evaluation of the neck soft tissues demonstrates no pathologic cervical lymphadenopathy or unexpected aerodigestive tract mass. The thyroid is homogeneous. The osseous structures demonstrate multilevel spondylosis, without evidence of fracture or aggressive osseous lesion. Three-vessel aortic arch with patent origins. The subclavian arteries are partially visualized and appear patent. On the right, 0% stenosis is present involving the ICA origin by NASCET criteria, with small amount of calcified atherosclerotic change present. 0% narrowing is present on left. The vertebral arteries are normal in course and caliber bilaterally. Intracranially, the carotid siphons demonstrate no significant atherosclerotic narrowing. The anterior cerebral arteries are normal in course and caliber bilaterally. The right middle cerebral artery demonstrates no evidence of flow-limiting stenosis, large vessel occlusion or aneurysmal dilatation. The left middle cerebral artery similarly normal in course and caliber. The vertebrobasilar system is patent. The posterior cerebral arteries are normal in course and caliber bilaterally.     Impression: Impression: CT perfusion demonstrates expected deficits relating to area of volume loss in the right inferior frontal lobe. There is otherwise no evidence of acute core infarct or territorial ischemic tissue at risk. Essentially normal CT angiogram of the head and neck without evidence of flow-limiting stenosis, large vessel occlusion or aneurysmal dilatation. Electronically Signed: Yo Gil  4/1/2023 1:33 PM EDT  Workstation ID: [id number]    CT CEREBRAL PERFUSION WITH & WITHOUT CONTRAST    Result Date: 4/1/2023  CT CEREBRAL PERFUSION W WO CONTRAST, CT ANGIOGRAM NECK, CT ANGIOGRAM HEAD W AI ANALYSIS OF LVO Date of Exam: 4/1/2023 12:33 PM EDT Indication: Neuro deficit, acute, stroke suspected.  Comparison: None available. Technique: Axial CT images of the brain were obtained prior to and after  the administration of 125 mL Isovue-370. Core blood volume, core blood flow, mean transit time, and Tmax images were obtained utilizing the Rapid software protocol. A limited CT angiogram of the head was also performed to measure the blood vessel density. Axial IV arterial phase contrast-enhanced CT angiogram of the head and neck. Three-dimensional reconstructions were postprocessed. The radiation dose reduction device was turned on for each scan per the ALARA (As Low as Reasonably Achievable) protocol. Findings: CT perfusion: Matched defects are present correlating to a right inferior frontal chronic infarct. There is otherwise no evidence of acute territorial infarct or significant territorial ischemic tissue at risk. CT ANGIOGRAM: The lung apices are clear. Evaluation of the neck soft tissues demonstrates no pathologic cervical lymphadenopathy or unexpected aerodigestive tract mass. The thyroid is homogeneous. The osseous structures demonstrate multilevel spondylosis, without evidence of fracture or aggressive osseous lesion. Three-vessel aortic arch with patent origins. The subclavian arteries are partially visualized and appear patent. On the right, 0% stenosis is present involving the ICA origin by NASCET criteria, with small amount of calcified atherosclerotic change present. 0% narrowing is present on left. The vertebral arteries are normal in course and caliber bilaterally. Intracranially, the carotid siphons demonstrate no significant atherosclerotic narrowing. The anterior cerebral arteries are normal in course and caliber bilaterally. The right middle cerebral artery demonstrates no evidence of flow-limiting stenosis, large vessel occlusion or aneurysmal dilatation. The left middle cerebral artery similarly normal in course and caliber. The vertebrobasilar system is patent. The posterior cerebral arteries are normal in course and caliber bilaterally.     Impression: Impression: CT perfusion demonstrates  expected deficits relating to area of volume loss in the right inferior frontal lobe. There is otherwise no evidence of acute core infarct or territorial ischemic tissue at risk. Essentially normal CT angiogram of the head and neck without evidence of flow-limiting stenosis, large vessel occlusion or aneurysmal dilatation. Electronically Signed: Yo Gil  4/1/2023 1:33 PM EDT  Workstation ID: [id number]          Assessment & Plan   Assessment & Plan     Active Hospital Problems    Diagnosis  POA   • **Diplopia [H53.2]  Yes     Patient is a 48-year-old  male with history of traumatic injury to the head, occipital fracture, subdural and some arachnoid hematoma.  Patient has had no visual abnormalities since the head injury.  Patient started having blurry vision and diplopia yesterday afternoon at work.  His wife also noticed disconjugate gaze.  Symptoms continued patient was brought to the emergency room today.  Labs are bland and imaging has not shown any acute process so far.  Stroke team is following the patient and MRI will be done probably later tonight.    PLAN:  -Continue aspirin and Lipitor high-dose.  -Follow MRI results  -We will follow neurology recommendations.  -Fall precautions        DVT prophylaxis: SCD      CODE STATUS:    Code Status and Medical Interventions:   Ordered at: 04/01/23 6175     Code Status (Patient has no pulse and is not breathing):    CPR (Attempt to Resuscitate)     Medical Interventions (Patient has pulse or is breathing):    Full Support       Expected Discharge to be determined       Luc Patten MD  04/01/23

## 2023-04-02 ENCOUNTER — APPOINTMENT (OUTPATIENT)
Dept: MRI IMAGING | Facility: HOSPITAL | Age: 48
DRG: 65 | End: 2023-04-02
Payer: COMMERCIAL

## 2023-04-02 LAB
BH CV ECHO MEAS - AO MAX PG: 7.6 MMHG
BH CV ECHO MEAS - AO MEAN PG: 4 MMHG
BH CV ECHO MEAS - AO ROOT DIAM: 3.2 CM
BH CV ECHO MEAS - AO V2 MAX: 138 CM/SEC
BH CV ECHO MEAS - AO V2 VTI: 27.1 CM
BH CV ECHO MEAS - AVA(I,D): 2.6 CM2
BH CV ECHO MEAS - EDV(CUBED): 64 ML
BH CV ECHO MEAS - EDV(MOD-SP2): 101 ML
BH CV ECHO MEAS - EDV(MOD-SP4): 96.8 ML
BH CV ECHO MEAS - EF(MOD-BP): 64.5 %
BH CV ECHO MEAS - EF(MOD-SP2): 63.8 %
BH CV ECHO MEAS - EF(MOD-SP4): 64.7 %
BH CV ECHO MEAS - ESV(CUBED): 17.6 ML
BH CV ECHO MEAS - ESV(MOD-SP2): 36.6 ML
BH CV ECHO MEAS - ESV(MOD-SP4): 34.2 ML
BH CV ECHO MEAS - FS: 35 %
BH CV ECHO MEAS - IVS/LVPW: 1.25 CM
BH CV ECHO MEAS - IVSD: 1 CM
BH CV ECHO MEAS - LA DIMENSION: 3.6 CM
BH CV ECHO MEAS - LAT PEAK E' VEL: 14.6 CM/SEC
BH CV ECHO MEAS - LV MASS(C)D: 109.7 GRAMS
BH CV ECHO MEAS - LV MAX PG: 4 MMHG
BH CV ECHO MEAS - LV MEAN PG: 2 MMHG
BH CV ECHO MEAS - LV V1 MAX: 100 CM/SEC
BH CV ECHO MEAS - LV V1 VTI: 22.8 CM
BH CV ECHO MEAS - LVIDD: 4 CM
BH CV ECHO MEAS - LVIDS: 2.6 CM
BH CV ECHO MEAS - LVOT AREA: 3.1 CM2
BH CV ECHO MEAS - LVOT DIAM: 2 CM
BH CV ECHO MEAS - LVPWD: 0.8 CM
BH CV ECHO MEAS - MED PEAK E' VEL: 9.6 CM/SEC
BH CV ECHO MEAS - MV A MAX VEL: 58.5 CM/SEC
BH CV ECHO MEAS - MV DEC TIME: 0.32 MSEC
BH CV ECHO MEAS - MV E MAX VEL: 70.8 CM/SEC
BH CV ECHO MEAS - MV E/A: 1.21
BH CV ECHO MEAS - PA ACC TIME: 0.11 SEC
BH CV ECHO MEAS - PA PR(ACCEL): 30 MMHG
BH CV ECHO MEAS - PI END-D VEL: 93 CM/SEC
BH CV ECHO MEAS - SV(LVOT): 71.6 ML
BH CV ECHO MEAS - SV(MOD-SP2): 64.4 ML
BH CV ECHO MEAS - SV(MOD-SP4): 62.6 ML
BH CV ECHO MEAS - TAPSE (>1.6): 1.86 CM
BH CV ECHO MEASUREMENTS AVERAGE E/E' RATIO: 5.85
BH CV ECHO SHUNT ASSESSMENT PERFORMED (HIDDEN SCRIPTING): 1
BH CV VAS BP LEFT ARM: NORMAL MMHG
BH CV XLRA - RV BASE: 3.6 CM
BH CV XLRA - RV LENGTH: 8.1 CM
BH CV XLRA - RV MID: 3.3 CM
BH CV XLRA - TDI S': 17.3 CM/SEC
CHOLEST SERPL-MCNC: 191 MG/DL (ref 0–200)
CRP SERPL-MCNC: <0.3 MG/DL (ref 0–0.5)
ERYTHROCYTE [SEDIMENTATION RATE] IN BLOOD: 5 MM/HR (ref 0–15)
GLUCOSE BLDC GLUCOMTR-MCNC: 105 MG/DL (ref 70–130)
HBA1C MFR BLD: 5.2 % (ref 4.8–5.6)
HDLC SERPL-MCNC: 43 MG/DL (ref 40–60)
IVRT: 88 MSEC
LDLC SERPL CALC-MCNC: 109 MG/DL (ref 0–100)
LDLC/HDLC SERPL: 2.38 {RATIO}
LEFT ATRIUM VOLUME INDEX: 30.4 ML/M2
LEFT ATRIUM VOLUME: 63.9 ML
MAXIMAL PREDICTED HEART RATE: 173 BPM
STRESS TARGET HR: 147 BPM
TRIGL SERPL-MCNC: 228 MG/DL (ref 0–150)
VLDLC SERPL-MCNC: 39 MG/DL (ref 5–40)

## 2023-04-02 PROCEDURE — 83036 HEMOGLOBIN GLYCOSYLATED A1C: CPT | Performed by: NURSE PRACTITIONER

## 2023-04-02 PROCEDURE — 85652 RBC SED RATE AUTOMATED: CPT | Performed by: STUDENT IN AN ORGANIZED HEALTH CARE EDUCATION/TRAINING PROGRAM

## 2023-04-02 PROCEDURE — 92523 SPEECH SOUND LANG COMPREHEN: CPT

## 2023-04-02 PROCEDURE — 97162 PT EVAL MOD COMPLEX 30 MIN: CPT

## 2023-04-02 PROCEDURE — 70553 MRI BRAIN STEM W/O & W/DYE: CPT

## 2023-04-02 PROCEDURE — 0 GADOBENATE DIMEGLUMINE 529 MG/ML SOLUTION: Performed by: HOSPITALIST

## 2023-04-02 PROCEDURE — 82962 GLUCOSE BLOOD TEST: CPT

## 2023-04-02 PROCEDURE — 97166 OT EVAL MOD COMPLEX 45 MIN: CPT

## 2023-04-02 PROCEDURE — 99233 SBSQ HOSP IP/OBS HIGH 50: CPT | Performed by: HOSPITALIST

## 2023-04-02 PROCEDURE — 86140 C-REACTIVE PROTEIN: CPT | Performed by: STUDENT IN AN ORGANIZED HEALTH CARE EDUCATION/TRAINING PROGRAM

## 2023-04-02 PROCEDURE — A9577 INJ MULTIHANCE: HCPCS | Performed by: HOSPITALIST

## 2023-04-02 PROCEDURE — 97530 THERAPEUTIC ACTIVITIES: CPT

## 2023-04-02 PROCEDURE — 80061 LIPID PANEL: CPT | Performed by: NURSE PRACTITIONER

## 2023-04-02 RX ORDER — ASPIRIN 81 MG/1
81 TABLET, CHEWABLE ORAL DAILY
Status: DISCONTINUED | OUTPATIENT
Start: 2023-04-02 | End: 2023-04-02 | Stop reason: ALTCHOICE

## 2023-04-02 RX ORDER — CLOPIDOGREL BISULFATE 75 MG/1
300 TABLET ORAL ONCE
Status: COMPLETED | OUTPATIENT
Start: 2023-04-02 | End: 2023-04-02

## 2023-04-02 RX ORDER — CLOPIDOGREL BISULFATE 75 MG/1
75 TABLET ORAL DAILY
Status: DISCONTINUED | OUTPATIENT
Start: 2023-04-03 | End: 2023-04-02 | Stop reason: ALTCHOICE

## 2023-04-02 RX ADMIN — GADOBENATE DIMEGLUMINE 20 ML: 529 INJECTION, SOLUTION INTRAVENOUS at 20:38

## 2023-04-02 RX ADMIN — ATORVASTATIN CALCIUM 80 MG: 40 TABLET, FILM COATED ORAL at 22:15

## 2023-04-02 RX ADMIN — CLOPIDOGREL BISULFATE 300 MG: 75 TABLET ORAL at 08:19

## 2023-04-02 RX ADMIN — Medication 10 ML: at 22:15

## 2023-04-02 RX ADMIN — Medication 10 ML: at 08:20

## 2023-04-02 RX ADMIN — ASPIRIN 81 MG 81 MG: 81 TABLET ORAL at 08:19

## 2023-04-02 NOTE — PLAN OF CARE
Goal Outcome Evaluation:  Plan of Care Reviewed With: patient, sibling        Progress: improving   SLP evaluation completed. Will sign-off for cog/comm at this time. Pt functional for speech, language and cognition. Please see note for further details and recommendations.

## 2023-04-02 NOTE — PLAN OF CARE
Goal Outcome Evaluation:  Plan of Care Reviewed With: patient, sibling        Progress: no change  Outcome Evaluation: Pt presents with diplopia, decreased balance, and decreased safety awareness with mobility. Pt ambulated 400ft with CGA and RW, occasional min A at gait belt to improve multiple LOB while ambulating. Pt used visual occlusion glasses to assist with diplopia, balance improved but overall pt has decreased safety awareness with significant balance deficits. Recommend continued skilled IP PT interventions. Recommend D/C home with assist and RW for DME.

## 2023-04-02 NOTE — THERAPY EVALUATION
Patient Name: Louie Levy  : 1975    MRN: [id number]                              Today's Date: 2023       Admit Date: 2023    Visit Dx:     ICD-10-CM ICD-9-CM   1. Diplopia  H53.2 368.2   2. Ataxia  R27.0 781.3     Patient Active Problem List   Diagnosis   • ADHD (attention deficit hyperactivity disorder)   • Closed fracture of left clavicle   • Mood disorder   • Seasonal allergies   • Tinnitus of both ears   • Brain bleed   • History of alcohol use   • Annual physical exam   • Sinusitis   • Diplopia     Past Medical History:   Diagnosis Date   • ADHD (attention deficit hyperactivity disorder)    • Closed fracture of left clavicle    • Mood disorder    • Seasonal allergies    • Tinnitus of both ears 2020     Past Surgical History:   Procedure Laterality Date   • CLAVICLE SURGERY Left    • NASAL SEPTAL RECONSTRUCTION     • WISDOM TOOTH EXTRACTION        General Information     Row Name 23 1043          OT Time and Intention    Document Type evaluation;therapy note (daily note)  -TB     Mode of Treatment occupational therapy;individual therapy  -TB     Row Name 23 1043          General Information    Patient Profile Reviewed yes  -TB     Prior Level of Function independent:;all household mobility;community mobility;ADL's;driving;shopping;using stairs;work  -TB     Existing Precautions/Restrictions fall;other (see comments)  Pt presents with diplopia, horizontal nystagmus, and R eye disconjugate gaze (R eye does not cross midline). Balance significantly impacted by vision deficits. Further impracted with any head mvmts while up.  -TB     Barriers to Rehab medically complex  -TB     Row Name 23 1043          Occupational Profile    Reason for Services/Referral (Occupational Profile) Occupational decline  -TB     Environmental Supports and Barriers (Occupational Profile) Pt lives in a 3-level home with his spouse and 14 y/o son. 5 steps to enter home, flight to bed  and bathroom. Walk-in shower. Comfort ht commodes. No AD or falls at baseline. Independent with ADLs. h/o SAH/TBI  -TB     Row Name 04/02/23 1043          Living Environment    People in Home spouse  -TB     Row Name 04/02/23 1043          Home Main Entrance    Number of Stairs, Main Entrance five  -TB     Row Name 04/02/23 1043          Stairs Within Home, Primary    Stairs, Within Home, Primary Flight to basement and bed/bathroom. Pt able to stay on main floor if necessary.  -TB     Stair Railings, Within Home, Primary railings safe and in good condition  -TB     Row Name 04/02/23 1043          Cognition    Orientation Status (Cognition) oriented x 4  -TB     Row Name 04/02/23 1043          Safety Issues, Functional Mobility    Safety Issues Affecting Function (Mobility) insight into deficits/self-awareness;awareness of need for assistance;safety precaution awareness;safety precautions follow-through/compliance;judgment  -TB     Impairments Affecting Function (Mobility) balance;visual/perceptual  -TB     Comment, Safety Issues/Impairments (Mobility) Pt presents with diplopia, horizontal nystagmus, and R eye disconjugate gaze (R eye does not cross midline). Balance significantly impacted by vision deficits. Further impracted with any head mvmts while up.  -TB           User Key  (r) = Recorded By, (t) = Taken By, (c) = Cosigned By    Initials Name Provider Type    TB Silvia Oliveira OT Occupational Therapist                 Mobility/ADL's     Row Name 04/02/23 1049          Bed Mobility    Bed Mobility supine-sit;sit-supine;scooting/bridging  -TB     Scooting/Bridging [place] (Bed Mobility) supervision  -TB     Supine-Sit [place] (Bed Mobility) supervision  -TB     Sit-Supine [place] (Bed Mobility) supervision  -TB     Comment, (Bed Mobility) Pt moves efficiently in/out of bed  -TB     Row Name 04/02/23 1049          Transfers    Transfers sit-stand transfer;stand-sit transfer  -     Row  Name 04/02/23 1049          Sit-Stand Transfer    Sit-Stand [place] (Transfers) contact guard;verbal cues  -     Row Name 04/02/23 1049          Stand-Sit Transfer    Stand-Sit [place] (Transfers) contact guard;verbal cues  -     Row Name 04/02/23 1049          Functional Mobility    Functional Mobility- Ind. Level minimum assist (75% patient effort)  -     Functional Mobility-Distance (Feet) 100  -TB     Functional Mobility- Safety Issues balance decreased during turns  -     Functional Mobility- Comment Pt up with HHA in room/hallway. LOB x2 with head mvmts to R/L side while ambulating. Required Min A to recover balance.  -     Row Name 04/02/23 1049          Activities of Daily Living    BADL Assessment/Intervention lower body dressing;feeding  -[facility] Name 04/02/23 1049          Lower Body Dressing Assessment/Training    [place] Level (Lower Body Dressing) doff;don;socks;independent  -     Position (Lower Body Dressing) edge of bed sitting  -     Row Name 04/02/23 1049          Self-Feeding Assessment/Training    [place] Level (Feeding) independent;liquids to mouth;finger foods  -     Position (Self-Feeding) sitting up in bed  -           User Key  (r) = Recorded By, (t) = Taken By, (c) = Cosigned By    Initials Name Provider Type     Silvia Oliveira, OT Occupational Therapist               Obj/Interventions     Row Name 04/02/23 1051          Sensory Assessment (Somatosensory)    Sensory Assessment (Somatosensory) UE sensation intact  -     Row Name 04/02/23 1051          Vision Assessment/Intervention    Visual Impairment/Limitations blurry vision;diplopia;other (see comments)  Horizontal nystagmus  -     Vision Assessment Comment Pt presents with diplopia, horizontal nystagmus, and R eye disconjugate gaze (R eye does not cross midline). Trial of visual occlusion initiated. R lense of glasses covered with opaque tape. Pt reports immediate relief with  "symptoms of double vision and \"feeling disoriented\", while symptoms of nystagmus persist. RN notified.  -TB     Row Name 04/02/23 1051          Range of Motion Comprehensive    General Range of Motion bilateral upper extremity ROM WNL  -TB     Comment, General Range of Motion BUE AROM intact  -TB     Row Name 04/02/23 1051          Strength Comprehensive (MMT)    General Manual Muscle Testing (MMT) Assessment no strength deficits identified  -TB     Comment, General Manual Muscle Testing (MMT) Assessment Good general strength. Active at baseline.  -TB     Row Name 04/02/23 1051          Balance    Balance Assessment sitting dynamic balance;sit to stand dynamic balance;standing dynamic balance  -TB     Dynamic Sitting Balance supervision  -TB     Position, Sitting Balance unsupported;sitting edge of bed  -TB     Sit to Stand Dynamic Balance minimal assist;1-person assist;verbal cues  -TB     Dynamic Standing Balance minimal assist;1-person assist;verbal cues  -TB     Position/Device Used, Standing Balance supported  -TB     Balance Interventions sitting;standing;sit to stand;supported;dynamic;dynamic reaching;occupation based/functional task;UE activity with balance activity;vision occluded activity;combined head and eye movements  -TB     Comment, Balance LOB x2 with head mvmts to R/L side while ambulating. Required assist to recover balance.  -TB           User Key  (r) = Recorded By, (t) = Taken By, (c) = Cosigned By    Initials Name Provider Type    TB Silvia Oliveira OT Occupational Therapist               Goals/Plan     Row Name 04/02/23 1103          Transfer Goal 1 (OT)    Activity/Assistive Device (Transfer Goal 1, OT) toilet  -TB     [place] Level/Cues Needed (Transfer Goal 1, OT) supervision required  -TB     Time Frame (Transfer Goal 1, OT) by discharge  -TB     Strategies/Barriers (Transfers Goal 1, OT) Anticipate up with RW for safety  -TB     Progress/Outcome (Transfer Goal 1, OT) goal " "ongoing  -TB     Row Name 04/02/23 1103          Toileting Goal 1 (OT)    Activity/Device (Toileting Goal 1, OT) toileting skills, all  -TB     [place] Level/Cues Needed (Toileting Goal 1, OT) supervision required  -TB     Time Frame (Toileting Goal 1, OT) by discharge  -TB     Progress/Outcome (Toileting Goal 1, OT) goal ongoing  -TB     Row Name 04/02/23 1103          Therapy Assessment/Plan (OT)    Planned Therapy Interventions (OT) occupation/activity based interventions;other (see comments)  Visual Occlusion trial intiated. OT will follow up in treatment. RN notified. Recommend glasses with OOB activity.  -TB           User Key  (r) = Recorded By, (t) = Taken By, (c) = Cosigned By    Initials Name Provider Type    TB Silvia Oliveira, OT Occupational Therapist               Clinical Impression     Row Name 04/02/23 1100          Pain Assessment    Pretreatment Pain Rating 2/10  -TB     Posttreatment Pain Rating 2/10  -TB     Pain Location - Side/Orientation Right  -TB     Pain Location anterior  -TB     Pain Location - head  -TB     Pre/Posttreatment Pain Comment Pt tolerates dynamic OOB activity  -TB     Pain Intervention(s) Ambulation/increased activity;Repositioned  -TB     Row Name 04/02/23 1100          Plan of Care Review    Plan of Care Reviewed With patient;spouse  -TB     Progress --  IE  -TB     Outcome Evaluation Pt is A/Ox4 and motivated to work with therapy. Pt presents with diplopia, horizontal nystagmus, and R eye disconjugate gaze (R eye does not cross midline). Trial of visual occlusion initiated. Pt reports immediate relief with symptoms of double vision and \"feeling disoriented\", while symptoms of nystagmus persist. RN notified. Recommend glasses for OOB activity. BUE AROM, strength, and sensation are intact. Pt is up with Min A for LOBx2. Supervision for ADLs. OT will follow IP. Recommend home with assist/Supervision when medically ready.  -TB     Row Name 04/02/23 1100          " Therapy Assessment/Plan (OT)    Rehab Potential (OT) good, to achieve stated therapy goals  -TB     Criteria for Skilled Therapeutic Interventions Met (OT) yes;meets criteria;skilled treatment is necessary  -TB     Therapy Frequency (OT) daily  -TB     Row Name 04/02/23 1100          Therapy Plan Review/Discharge Plan (OT)    Equipment Needs Upon Discharge (OT) shower chair  home safety/fall prevention  -TB     Anticipated Discharge Disposition (OT) home with assist;home with supervision  -TB     Row Name 04/02/23 1100          Vital Signs    Pre Systolic BP Rehab --  RN cleared OT  -TB     Intra Systolic BP Rehab 143  -TB     Intra Treatment Diastolic BP 88  -TB     Pre SpO2 (%) 95  -TB     O2 Delivery Pre Treatment room air  -TB     Pre Patient Position Supine  -TB     Intra Patient Position Standing  -TB     Post Patient Position Supine  -TB     Row Name 04/02/23 1100          Positioning and Restraints    Pre-Treatment Position in bed  -TB     Post Treatment Position bed  -TB     In Bed notified nsg;fowlers;call light within reach;encouraged to call for assist;exit alarm on  -TB           User Key  (r) = Recorded By, (t) = Taken By, (c) = Cosigned By    Initials Name Provider Type    TB Silvia Oliveira, OT Occupational Therapist               Outcome Measures     Row Name 04/02/23 1105          How much help from another is currently needed...    Putting on and taking off regular lower body clothing? 3  -TB     Bathing (including washing, rinsing, and drying) 3  -TB     Toileting (which includes using toilet bed pan or urinal) 3  -TB     Putting on and taking off regular upper body clothing 3  -TB     Taking care of personal grooming (such as brushing teeth) 3  -TB     Eating meals 3  -TB     AM-PAC 6 Clicks Score (OT) 18  -TB     Row Name 04/02/23 1105          Modified [place] Scale    Pre-Stroke Modified [place] Scale 1 - No significant disability despite symptoms.  Able to carry out all usual duties and  activities.  -TB     Modified [place] Scale 4 - Moderately severe disability.  Unable to walk without assistance, and unable to attend to own bodily needs without assistance.  Unable to walk without assistance  -     Row Name 04/02/23 1105          Functional Assessment    Outcome Measure Options AM-PAC 6 Clicks Daily Activity (OT);Modified [place]  -TB           User Key  (r) = Recorded By, (t) = Taken By, (c) = Cosigned By    Initials Name Provider Type     Silvia Oliveira OT Occupational Therapist                Occupational Therapy Education     Title: PT OT SLP Therapies (In Progress)     Topic: Occupational Therapy (In Progress)     Point: ADL training (Done)     Description:   Instruct learner(s) on proper safety adaptation and remediation techniques during self care or transfers.   Instruct in proper use of assistive devices.              Learning Progress Summary           Patient Acceptance, E,D, VU,NR by TB at 4/2/2023 1111   Family Acceptance, E,D, VU,NR by TB at 4/2/2023 1111                   Point: Home exercise program (Not Started)     Description:   Instruct learner(s) on appropriate technique for monitoring, assisting and/or progressing therapeutic exercises/activities.              Learner Progress:  Not documented in this visit.          Point: Precautions (Done)     Description:   Instruct learner(s) on prescribed precautions during self-care and functional transfers.              Learning Progress Summary           Patient Acceptance, E,D, VU,NR by TB at 4/2/2023 1111   Family Acceptance, E,D, VU,NR by TB at 4/2/2023 1111                   Point: Body mechanics (Not Started)     Description:   Instruct learner(s) on proper positioning and spine alignment during self-care, functional mobility activities and/or exercises.              Learner Progress:  Not documented in this visit.                      User Key     Initials Effective Dates Name Provider Type Discipline     02/03/23  "-  Silvia Oliveira OT Occupational Therapist OT              OT Recommendation and Plan  Planned Therapy Interventions (OT): occupation/activity based interventions, other (see comments) (Visual Occlusion trial intiated. OT will follow up in treatment. RN notified. Recommend glasses with OOB activity.)  Therapy Frequency (OT): daily  Plan of Care Review  Plan of Care Reviewed With: patient, spouse  Progress:  (IE)  Outcome Evaluation: Pt is A/Ox4 and motivated to work with therapy. Pt presents with diplopia, horizontal nystagmus, and R eye disconjugate gaze (R eye does not cross midline). Trial of visual occlusion initiated. Pt reports immediate relief with symptoms of double vision and \"feeling disoriented\", while symptoms of nystagmus persist. RN notified. Recommend glasses for OOB activity. BUE AROM, strength, and sensation are intact. Pt is up with Min A for LOBx2. Supervision for ADLs. OT will follow IP. Recommend home with assist/Supervision when medically ready.     Time Calculation:    Time Calculation- OT     Row Name 04/02/23 0959             Time Calculation- OT    OT Start Time 0959  -TB      OT Received On 04/02/23  -TB      OT Goal Re-Cert Due Date 04/12/23  -TB         Timed Charges    77469 - OT Therapeutic Activity Minutes 20  -TB         Untimed Charges    OT Eval/Re-eval Minutes 45  -TB         Total Minutes    Timed Charges Total Minutes 20  -TB      Untimed Charges Total Minutes 45  -TB       Total Minutes 65  -TB            User Key  (r) = Recorded By, (t) = Taken By, (c) = Cosigned By    Initials Name Provider Type    TB Silvia Oliveira OT Occupational Therapist              Therapy Charges for Today     Code Description Service Date Service Provider Modifiers Qty    [id number]  OT THERAPEUTIC ACT EA 15 MIN 4/2/2023 Silvia Oliveira OT GO 1    [id number] HC OT EVAL MOD COMPLEXITY 3 4/2/2023 Silvia Oliveira OT GO 1               Silvia Oliveira, " OT  4/2/2023

## 2023-04-02 NOTE — PLAN OF CARE
"  Problem: Adult Inpatient Plan of Care  Goal: Plan of Care Review  Recent Flowsheet Documentation  Taken 4/2/2023 1100 by Silvia Oliveira OT  Progress: (IE) --  Plan of Care Reviewed With:   patient   spouse  Outcome Evaluation: Pt is A/Ox4 and motivated to work with therapy. Pt presents with diplopia, horizontal nystagmus, and R eye disconjugate gaze (R eye does not cross midline). Trial of visual occlusion initiated. Pt reports immediate relief with symptoms of double vision and \"feeling disoriented\", while symptoms of nystagmus persist. RN notified. Recommend glasses for OOB activity. BUE AROM, strength, and sensation are intact. Pt is up with Min A for LOBx2. Supervision for ADLs. OT will follow IP. Recommend home with assist/Supervision when medically ready.     "

## 2023-04-02 NOTE — THERAPY EVALUATION
Patient Name: Louie Levy  : 1975    MRN: [id number]                              Today's Date: 2023       Admit Date: 2023    Visit Dx:     ICD-10-CM ICD-9-CM   1. Diplopia  H53.2 368.2   2. Ataxia  R27.0 781.3     Patient Active Problem List   Diagnosis   • ADHD (attention deficit hyperactivity disorder)   • Closed fracture of left clavicle   • Mood disorder   • Seasonal allergies   • Tinnitus of both ears   • Brain bleed   • History of alcohol use   • Annual physical exam   • Sinusitis   • Diplopia     Past Medical History:   Diagnosis Date   • ADHD (attention deficit hyperactivity disorder)    • Closed fracture of left clavicle    • Mood disorder    • Seasonal allergies    • Tinnitus of both ears 2020     Past Surgical History:   Procedure Laterality Date   • CLAVICLE SURGERY Left    • NASAL SEPTAL RECONSTRUCTION     • WISDOM TOOTH EXTRACTION        General Information     Row Name 23 1358          Physical Therapy Time and Intention    Document Type evaluation  -AE     Mode of Treatment physical therapy  -AE     Row Name 23 1351          General Information    Patient Profile Reviewed yes  -AE     Prior Level of Function independent:;all household mobility;gait;transfer;community mobility;bed mobility;ADL's;dressing;bathing  -AE     Existing Precautions/Restrictions fall;other (see comments)  diplopia, horizontal nystagmus, and R eye disconjugate gaze (R eye does not cross midline). Balance significantly impacted by vision deficits. Further impracted with any head mvmts while up.  -AE     Barriers to Rehab medically complex  -AE     Row Name 23 135          Living Environment    People in Home spouse;child(haresh), dependent  -AE     Row Name 23 1351          Home Main Entrance    Number of Stairs, Main Entrance five  -AE     Stair Railings, Main Entrance railings on both sides of stairs  -AE     Row Name 23 135          Stairs Within Home, Primary     Stairs, Within Home, Primary Flight to basement and upstairs bedroom. Pt reports he may stay in basement which is a walk in immediately after hospitalization.  -AE     Number of Stairs, Within Home, Primary other (see comments)  12 to basement, 12 to upstairs bedroom  -AE     Stair Railings, Within Home, Primary railings safe and in good condition  -AE     Row Name 04/02/23 1359          Cognition    Orientation Status (Cognition) oriented x 4  -AE     Row Name 04/02/23 1359          Safety Issues, Functional Mobility    Safety Issues Affecting Function (Mobility) awareness of need for assistance;insight into deficits/self-awareness;safety precaution awareness;judgment;sequencing abilities  -AE     Impairments Affecting Function (Mobility) balance;visual/perceptual;coordination  -AE           User Key  (r) = Recorded By, (t) = Taken By, (c) = Cosigned By    Initials Name Provider Type    AE Kavon Leroy, PT Physical Therapist               Mobility     Row Name 04/02/23 1403          Bed Mobility    Bed Mobility supine-sit;sit-supine  -AE     Supine-Sit [place] (Bed Mobility) standby assist  -AE     Sit-Supine [place] (Bed Mobility) standby assist  -AE     Assistive Device (Bed Mobility) head of bed elevated  -AE     Comment, (Bed Mobility) No cues required for bed mobility. Pt demo good safety awareness.  -AE     Row Name 04/02/23 1403          Transfers    Comment, (Transfers) VCs for sequencing. Pt initially demo multiple LOB after completing STS, requiring cues to improve safety awareness and judgement during transfers. Pt is slightly impulsive with disregard to safety at times.  -AE     Row Name 04/02/23 1403          Sit-Stand Transfer    Sit-Stand [place] (Transfers) contact guard;1 person assist  -AE     Row Name 04/02/23 1403          Gait/Stairs (Locomotion)    [place] Level (Gait) contact guard;1 person assist;verbal cues  -AE     Assistive Device (Gait) walker, front-wheeled   -AE     Distance in Feet (Gait) 400  -AE     Deviations/Abnormal Patterns (Gait) bilateral deviations;base of support, narrow  -AE     Bilateral Gait Deviations heel strike decreased  -AE     Comment, (Gait/Stairs) Pt demo step through gait pattern with increased pace and decreased safety awareness. Pt required increased cues to improve balance but continues to demo multiple LOB while ambulating 2/2 decreased safety awareness, especially during turns. Pt demo improved balance while ambulating in straight path and focusing on task. Further distance limited by decreased balance.  -AE           User Key  (r) = Recorded By, (t) = Taken By, (c) = Cosigned By    Initials Name Provider Type    AE Kavon Leroy, PT Physical Therapist               Obj/Interventions     Row Name 04/02/23 1407          Range of Motion Comprehensive    General Range of Motion bilateral lower extremity ROM WFL  -AE     Row Name 04/02/23 1407          Strength Comprehensive (MMT)    General Manual Muscle Testing (MMT) Assessment no strength deficits identified  -AE     Row Name 04/02/23 1407          Balance    Balance Assessment sitting static balance;sitting dynamic balance;sit to stand dynamic balance;standing static balance;standing dynamic balance  -AE     Static Sitting Balance standby assist  -AE     Dynamic Sitting Balance standby assist  -AE     Position, Sitting Balance unsupported;sitting edge of bed  -AE     Sit to Stand Dynamic Balance contact guard;1-person assist;verbal cues  -AE     Static Standing Balance contact guard  -AE     Dynamic Standing Balance contact guard;1-person assist;verbal cues  occasional min A at gait belt for balance  -AE     Position/Device Used, Standing Balance supported;walker, front-wheeled  -AE     Row Name 04/02/23 1407          Sensory Assessment (Somatosensory)    Sensory Assessment (Somatosensory) LE sensation intact  -AE           User Key  (r) = Recorded By, (t) = Taken By, (c) = Cosigned By     Initials Name Provider Type    AE Kavon Leroy, PT Physical Therapist               Goals/Plan     Row Name 04/02/23 1411          Bed Mobility Goal 1 (PT)    Activity/Assistive Device (Bed Mobility Goal 1, PT) sit to supine/supine to sit  -AE     [place] Level/Cues Needed (Bed Mobility Goal 1, PT) independent  -AE     Time Frame (Bed Mobility Goal 1, PT) long term goal (LTG);10 days  -AE     Progress/Outcomes (Bed Mobility Goal 1, PT) goal ongoing  -AE     Row Name 04/02/23 1411          Transfer Goal 1 (PT)    Activity/Assistive Device (Transfer Goal 1, PT) sit-to-stand/stand-to-sit;bed-to-chair/chair-to-bed  -AE     [place] Level/Cues Needed (Transfer Goal 1, PT) modified independence  -AE     Time Frame (Transfer Goal 1, PT) long term goal (LTG);10 days  -AE     Progress/Outcome (Transfer Goal 1, PT) goal ongoing  -AE     Row Name 04/02/23 1411          Gait Training Goal 1 (PT)    Activity/Assistive Device (Gait Training Goal 1, PT) gait (walking locomotion);assistive device use  -AE     [place] Level (Gait Training Goal 1, PT) standby assist  -AE     Distance (Gait Training Goal 1, PT) 500ft  -AE     Time Frame (Gait Training Goal 1, PT) long term goal (LTG);10 days  -AE     Progress/Outcome (Gait Training Goal 1, PT) goal ongoing  -AE     Row Name 04/02/23 1411          Stairs Goal 1 (PT)    Activity/Assistive Device (Stairs Goal 1, PT) ascending stairs;descending stairs;step-to-step  -AE     [place] Level/Cues Needed (Stairs Goal 1, PT) contact guard required  -AE     Number of Stairs (Stairs Goal 1, PT) 10  -AE     Time Frame (Stairs Goal 1, PT) long term goal (LTG);10 days  -AE     Progress/Outcome (Stairs Goal 1, PT) goal ongoing  -AE     Row Name 04/02/23 1411          Therapy Assessment/Plan (PT)    Planned Therapy Interventions (PT) balance training;bed mobility training;gait training;home exercise program;patient/family education;postural re-education;ROM (range of  motion);stair training;strengthening;transfer training  -AE           User Key  (r) = Recorded By, (t) = Taken By, (c) = Cosigned By    Initials Name Provider Type    AE Kavon Leroy, PT Physical Therapist               Clinical Impression     Row Name 04/02/23 1408          Pain    Pretreatment Pain Rating 0/10 - no pain  -AE     Posttreatment Pain Rating 0/10 - no pain  -AE     Row Name 04/02/23 1408          Plan of Care Review    Plan of Care Reviewed With patient;sibling  -AE     Progress no change  -AE     Outcome Evaluation Pt presents with diplopia, decreased balance, and decreased safety awareness with mobility. Pt ambulated 400ft with CGA and RW, occasional min A at gait belt to improve multiple LOB while ambulating. Pt used visual occlusion glasses to assist with diplopia, balance improved but overall pt has decreased safety awareness with significant balance deficits. Recommend continued skilled IP PT interventions. Recommend D/C home with assist and RW for DME.  -AE     Row Name 04/02/23 1409          Therapy Assessment/Plan (PT)    Patient/Family Therapy Goals Statement (PT) Return home  -AE     Rehab Potential (PT) good, to achieve stated therapy goals  -AE     Criteria for Skilled Interventions Met (PT) yes  -AE     Therapy Frequency (PT) daily  -AE     Row Name 04/02/23 1408          Vital Signs    Pre Systolic BP Rehab 131  -AE     Pre Treatment Diastolic BP 69  -AE     Pretreatment Heart Rate (beats/min) 93  -AE     Posttreatment Heart Rate (beats/min) 95  -AE     Pre SpO2 (%) 89  -AE     O2 Delivery Pre Treatment room air  -AE     O2 Delivery Intra Treatment room air  -AE     Post SpO2 (%) 94  -AE     O2 Delivery Post Treatment room air  -AE     Pre Patient Position Supine  -AE     Intra Patient Position Standing  -AE     Post Patient Position Supine  -AE     Row Name 04/02/23 1400          Positioning and Restraints    In Bed notified nsg;javi;call light within reach;encouraged to call  for assist;exit alarm on;with family/caregiver;side rails up x2;legs elevated  -AE           User Key  (r) = Recorded By, (t) = Taken By, (c) = Cosigned By    Initials Name Provider Type    AE Kavon Leroy, KASSANDRA Physical Therapist               Outcome Measures     Row Name 04/02/23 1436          How much help from another person do you currently need...    Turning from your back to your side while in flat bed without using bedrails? 4  -AE     Moving from lying on back to sitting on the side of a flat bed without bedrails? 4  -AE     Moving to and from a bed to a chair (including a wheelchair)? 3  -AE     Standing up from a chair using your arms (e.g., wheelchair, bedside chair)? 3  -AE     Climbing 3-5 steps with a railing? 2  -AE     To walk in hospital room? 3  -AE     AM-PAC 6 Clicks Score (PT) 19  -AE     Highest level of mobility 6 --> Walked 10 steps or more  -AE     Row Name 04/02/23 1436 04/02/23 1105       Modified [place] Scale    Pre-Stroke Modified [place] Scale 1 - No significant disability despite symptoms.  Able to carry out all usual duties and activities.  -AE 1 - No significant disability despite symptoms.  Able to carry out all usual duties and activities.  -TB    Modified [place] Scale 4 - Moderately severe disability.  Unable to walk without assistance, and unable to attend to own bodily needs without assistance.  -AE 4 - Moderately severe disability.  Unable to walk without assistance, and unable to attend to own bodily needs without assistance.  Unable to walk without assistance  -TB    Row Name 04/02/23 1436 04/02/23 1105       Functional Assessment    Outcome Measure Options AM-PAC 6 Clicks Basic Mobility (PT);Modified [place]  -AE AM-PAC 6 Clicks Daily Activity (OT);Modified [place]  -TB          User Key  (r) = Recorded By, (t) = Taken By, (c) = Cosigned By    Initials Name Provider Type    TB Silvia Oliveira, OT Occupational Therapist    Kavon Rhodes, KASSANDRA Physical Therapist                              Physical Therapy Education     Title: PT OT SLP Therapies (In Progress)     Topic: Physical Therapy (In Progress)     Point: Mobility training (Done)     Learning Progress Summary           Patient Acceptance, E, VU by AE at 4/2/2023 1325                   Point: Home exercise program (Not Started)     Learner Progress:  Not documented in this visit.          Point: Body mechanics (Done)     Learning Progress Summary           Patient Acceptance, E, VU by AE at 4/2/2023 1325                   Point: Precautions (Done)     Learning Progress Summary           Patient Acceptance, E, VU by AE at 4/2/2023 1325                               User Key     Initials Effective Dates Name Provider Type Discipline    AE 09/21/21 -  Kavon Leroy, PT Physical Therapist PT              PT Recommendation and Plan  Planned Therapy Interventions (PT): balance training, bed mobility training, gait training, home exercise program, patient/family education, postural re-education, ROM (range of motion), stair training, strengthening, transfer training  Plan of Care Reviewed With: patient, sibling  Progress: no change  Outcome Evaluation: Pt presents with diplopia, decreased balance, and decreased safety awareness with mobility. Pt ambulated 400ft with CGA and RW, occasional min A at gait belt to improve multiple LOB while ambulating. Pt used visual occlusion glasses to assist with diplopia, balance improved but overall pt has decreased safety awareness with significant balance deficits. Recommend continued skilled IP PT interventions. Recommend D/C home with assist and RW for DME.     Time Calculation:    PT Charges     Row Name 04/02/23 1438             Time Calculation    Start Time 1325  -AE      PT Received On 04/02/23  -AE      PT Goal Re-Cert Due Date 04/12/23  -AE         Untimed Charges    PT Eval/Re-eval Minutes 47  -AE         Total Minutes    Untimed Charges Total Minutes 47  -AE       Total Minutes  47  -AE            User Key  (r) = Recorded By, (t) = Taken By, (c) = Cosigned By    Initials Name Provider Type    AE Kavon Leroy, PT Physical Therapist              Therapy Charges for Today     Code Description Service Date Service Provider Modifiers Qty    63185523994 HC PT EVAL MOD COMPLEXITY 4 4/2/2023 Kavon Leroy, PT GP 1          PT G-Codes  Outcome Measure Options: AM-PAC 6 Clicks Basic Mobility (PT), Modified [place]  AM-PAC 6 Clicks Score (PT): 19  AM-PAC 6 Clicks Score (OT): 18  Modified [place] Scale: 4 - Moderately severe disability.  Unable to walk without assistance, and unable to attend to own bodily needs without assistance.  PT Discharge Summary  Anticipated Discharge Disposition (PT): home with assist    Kavon Leroy PT  4/2/2023

## 2023-04-02 NOTE — CASE MANAGEMENT/SOCIAL WORK
Discharge Planning Assessment  [facility]     Patient Name: Louie Levy  MRN: [id number]  Today's Date: 4/2/2023    Admit Date: 4/1/2023    Plan: home with family   Discharge Needs Assessment     Row Name 04/02/23 1041       Living Environment    People in Home spouse    Current Living Arrangements home    Primary Care Provided by self    Provides Primary Care For no one    Family Caregiver if Needed spouse    Quality of Family Relationships involved;helpful    Able to Return to Prior Arrangements yes       Resource/Environmental Concerns    Resource/Environmental Concerns none       Food Insecurity    Within the past 12 months, you worried that your food would run out before you got the money to buy more. Never true    Within the past 12 months, the food you bought just didn't last and you didn't have money to get more. Never true       Transition Planning    Patient/Family Anticipates Transition to home with family    Patient/Family Anticipated Services at Transition none    Transportation Anticipated family or friend will provide       Discharge Needs Assessment    Readmission Within the Last 30 Days no previous admission in last 30 days    Concerns to be Addressed discharge planning    Anticipated Changes Related to Illness none    Equipment Needed After Discharge none               Discharge Plan     Row Name 04/02/23 1042       Plan    Plan home with family    Patient/Family in Agreement with Plan yes    Plan Comments Pt reports he lives with his wife in [facility]. He denies use of any DME and is independent with ADLs. He is followed by his PCP and has drug coverage. Plan for discharge is to return home. CM to follow for any discharge needs    Final Discharge Disposition Code 01 - home or self-care              Continued Care and Services - Admitted Since 4/1/2023    Coordination has not been started for this encounter.       Expected Discharge Date and Time     Expected Discharge Date Expected  Discharge Time    Apr 5, 2023          Demographic Summary     Row Name 04/02/23 1041       General Information    Admission Type inpatient    Referral Source physician    Reason for Consult discharge planning    General Information Comments PCP Trina Feldman       Contact Information    Permission Granted to Share Info With family/designee    Contact Information Comments Annetta Levy [phone]               Functional Status     Row Name 04/02/23 1041       Functional Status, IADL    Medications independent    Meal Preparation independent    Housekeeping independent    Laundry independent    Shopping independent       Mental Status    General Appearance WDL WDL       Mental Status Summary    Recent Changes in Mental Status/Cognitive Functioning no changes               Psychosocial    No documentation.                Abuse/Neglect    No documentation.                Legal    No documentation.                Substance Abuse    No documentation.                Patient Forms    No documentation.                   Yudy Warner RN

## 2023-04-02 NOTE — PLAN OF CARE
Problem: Adult Inpatient Plan of Care  Goal: Patient-Specific Goal (Individualized)  4/2/2023 1657 by Georgina Romero, RN  Flowsheets (Taken 4/2/2023 1657)  Patient-Specific Goals (Include Timeframe): Patient A&Ox4, calm and cooperative with care.  Neuro checks Q4 hours, NIH=2.  Nystagmus noted to bilateral eyes, strabismus noted to right eye.  Patient reports gaze improved to right eye with eye covering.  Gait unsteady when ambulating.  Complaints of slight headache to forehead but denies need for medication.  NSR on telemetry.  No new complaints voiced.  4/2/2023 1655 by Georgina Romero, RN  Outcome: Ongoing, Progressing   Goal Outcome Evaluation:

## 2023-04-02 NOTE — PROGRESS NOTES
[facility] Medicine Services  PROGRESS NOTE    Patient Name: Louie Levy  : 1975  MRN: [id number]    Date of Admission: 2023  Primary Care Physician: Trina Feldman DO    Subjective   Subjective     CC:  Blurry and double vision    HPI:  Patient resting in bed. States he continues to have blurred/double vision that is the same as it was on arrival. No other symptoms or complaints.    ROS:  Gen- No fevers, chills, +double/blurred vision  CV- No chest pain, palpitations  Resp- No cough, dyspnea  GI- No N/V/D, abd pain    Objective   Objective     Vital Signs:   Temp:  [97.4 °F (36.3 °C)-98.6 °F (37 °C)] 98.1 °F (36.7 °C)  Heart Rate:  [61-97] 97  Resp:  [18] 18  BP: (120-148)/() 140/91     Physical Exam:  Constitutional: No acute distress, awake, alert  HENT: NCAT, mucous membranes moist, disconjugate gaze  Respiratory: Clear to auscultation bilaterally, respiratory effort normal   Cardiovascular: RRR, no murmurs, rubs, or gallops  Gastrointestinal: Positive bowel sounds, soft, nontender, nondistended  Musculoskeletal: No bilateral ankle edema  Psychiatric: Appropriate affect, cooperative  Neurologic: Oriented x 3, strength symmetric in all extremities, Cranial Nerves grossly intact to confrontation, speech clear  Skin: No rashes    Results Reviewed:  LAB RESULTS:      Lab 23  1200   WBC 6.10   HEMOGLOBIN 16.3   HEMATOCRIT 48.7   PLATELETS 280   NEUTROS ABS 3.73   IMMATURE GRANS (ABS) 0.02   LYMPHS ABS 1.79   MONOS ABS 0.49   EOS ABS 0.02   MCV 92.1         Lab 23  0341 23  1200   SODIUM  --  140   POTASSIUM  --  4.3   CHLORIDE  --  103   CO2  --  26.0   ANION GAP  --  11.0   BUN  --  12   CREATININE  --  0.80   EGFR  --  109.8   GLUCOSE  --  113*   CALCIUM  --  9.5   MAGNESIUM  --  2.4   HEMOGLOBIN A1C 5.20  --          Lab 23  1200   TOTAL PROTEIN 7.9   ALBUMIN 4.7   GLOBULIN 3.2   ALT (SGPT) 21   AST (SGOT) 20   BILIRUBIN 0.5   ALK PHOS 47          Lab 04/01/23  1200   HSTROP T <6         Lab 04/02/23  0341   CHOLESTEROL 191   LDL CHOL 109*   HDL CHOL 43   TRIGLYCERIDES 228*             Brief Urine Lab Results  (Last result in the past 365 days)      Color   Clarity   Blood   Leuk Est   Nitrite   Protein   CREAT   Urine HCG        04/01/23 2047 Yellow   Cloudy   Negative   Negative   Negative   Negative                 Microbiology Results Abnormal     None          Adult Transthoracic Echo Complete W/ Cont if Necessary Per Protocol (With Agitated Saline)    Result Date: 4/2/2023  •  Left ventricular ejection fraction appears to be 61 - 65%. •  Saline test results are negative.     CT Head Without Contrast    Result Date: 4/1/2023  CT HEAD WO CONTRAST Date of Exam: 4/1/2023 11:59 AM EDT Indication: DIZZINESS, BLURRY VISION, HX OF BLEEDS. Comparison: None available. Technique: Axial CT images were obtained of the head without contrast administration.  Reconstructed coronal and sagittal images were also obtained. Automated exposure control and iterative construction methods were used. Findings: There is no intracranial hemorrhage. There is no mass or midline shift. There is remote infarct in the right frontal lobe with encephalomalacia. There are no focal hypodensities to suggest acute or subacute infarct. The gray-white matter differentiation is intact. Brain volume is appropriate for patient's age. Mastoid air cells and paranasal sinuses are well aerated.     Impression: Impression: No acute intracranial abnormality on head CT. Remote right frontal infarct. MRI is more sensitive to evaluate for acute or subacute infarct. Electronically Signed: Ingrid Rubio  4/1/2023 12:14 PM EDT  Workstation ID: [id number]    CT Angiogram Neck    Result Date: 4/1/2023  CT CEREBRAL PERFUSION W WO CONTRAST, CT ANGIOGRAM NECK, CT ANGIOGRAM HEAD W AI ANALYSIS OF LVO Date of Exam: 4/1/2023 12:33 PM EDT Indication: Neuro deficit, acute, stroke suspected.  Comparison: None  available. Technique: Axial CT images of the brain were obtained prior to and after the administration of 125 mL Isovue-370. Core blood volume, core blood flow, mean transit time, and Tmax images were obtained utilizing the Rapid software protocol. A limited CT angiogram of the head was also performed to measure the blood vessel density. Axial IV arterial phase contrast-enhanced CT angiogram of the head and neck. Three-dimensional reconstructions were postprocessed. The radiation dose reduction device was turned on for each scan per the ALARA (As Low as Reasonably Achievable) protocol. Findings: CT perfusion: Matched defects are present correlating to a right inferior frontal chronic infarct. There is otherwise no evidence of acute territorial infarct or significant territorial ischemic tissue at risk. CT ANGIOGRAM: The lung apices are clear. Evaluation of the neck soft tissues demonstrates no pathologic cervical lymphadenopathy or unexpected aerodigestive tract mass. The thyroid is homogeneous. The osseous structures demonstrate multilevel spondylosis, without evidence of fracture or aggressive osseous lesion. Three-vessel aortic arch with patent origins. The subclavian arteries are partially visualized and appear patent. On the right, 0% stenosis is present involving the ICA origin by NASCET criteria, with small amount of calcified atherosclerotic change present. 0% narrowing is present on left. The vertebral arteries are normal in course and caliber bilaterally. Intracranially, the carotid siphons demonstrate no significant atherosclerotic narrowing. The anterior cerebral arteries are normal in course and caliber bilaterally. The right middle cerebral artery demonstrates no evidence of flow-limiting stenosis, large vessel occlusion or aneurysmal dilatation. The left middle cerebral artery similarly normal in course and caliber. The vertebrobasilar system is patent. The posterior cerebral arteries are normal in  course and caliber bilaterally.     Impression: Impression: CT perfusion demonstrates expected deficits relating to area of volume loss in the right inferior frontal lobe. There is otherwise no evidence of acute core infarct or territorial ischemic tissue at risk. Essentially normal CT angiogram of the head and neck without evidence of flow-limiting stenosis, large vessel occlusion or aneurysmal dilatation. Electronically Signed: Yo Gil  4/1/2023 1:33 PM EDT  Workstation ID: [id number]    MRI Brain Without Contrast    Result Date: 4/1/2023  MRI BRAIN WO CONTRAST Date of Exam: 4/1/2023 9:08 PM EDT Indication: Stroke, follow up.  Comparison: CT head from earlier today Technique:  Routine multiplanar/multisequence sequence images of the brain were obtained without contrast administration. Findings: There is mild restricted diffusion within the right posterior yulissa, which could represent an acute or subacute infarct. There is some petechial staining within the infarct, but no large hemorrhagic transformation. There is surrounding gliosis within the more peripheral right yulissa. The ventricles are stable in caliber, with no midline shift. The basal cisterns appear patent. There is encephalomalacia within the right frontal lobe. Subtle foci of periventricular and subcortical white matter FLAIR hyperintensities are nonspecific, but likely the sequela of minimal chronic small ischemic disease. The midline structures appear intact. The globes and orbits appear intact. The intracranial vascular flow-voids appear patent.     Impression: Impression: 1.Small acute or subacute infarct within right posterior yulissa. Some petechial staining within infarct, but no large hemorrhagic transformation. 2.Surrounding gliosis within more peripheral right yulissa. 3.Encephalomalacia within right frontal lobe. 4.Findings suggestive of minimal chronic small vessel ischemic disease. Electronically Signed: Audie Manuel  4/1/2023 9:53 PM EDT   Workstation ID: [id number]    XR Chest 1 View    Result Date: 4/1/2023  XR CHEST 1 VW Date of Exam: 4/1/2023 12:56 PM EDT Indication: Weak/Dizzy/AMS triage protocol. Comparison: None available. Findings: The heart is not enlarged. The lungs seem clear. There are no pleural effusions. There is a screw-plate device noted along the left clavicle.     Impression: Impression: 1.No acute pulmonary process. Electronically Signed: Esau Cullen  4/1/2023 1:37 PM EDT  Workstation ID: [id number]    CT Angiogram Head w AI Analysis of LVO    Result Date: 4/1/2023  CT CEREBRAL PERFUSION W WO CONTRAST, CT ANGIOGRAM NECK, CT ANGIOGRAM HEAD W AI ANALYSIS OF LVO Date of Exam: 4/1/2023 12:33 PM EDT Indication: Neuro deficit, acute, stroke suspected.  Comparison: None available. Technique: Axial CT images of the brain were obtained prior to and after the administration of 125 mL Isovue-370. Core blood volume, core blood flow, mean transit time, and Tmax images were obtained utilizing the Rapid software protocol. A limited CT angiogram of the head was also performed to measure the blood vessel density. Axial IV arterial phase contrast-enhanced CT angiogram of the head and neck. Three-dimensional reconstructions were postprocessed. The radiation dose reduction device was turned on for each scan per the ALARA (As Low as Reasonably Achievable) protocol. Findings: CT perfusion: Matched defects are present correlating to a right inferior frontal chronic infarct. There is otherwise no evidence of acute territorial infarct or significant territorial ischemic tissue at risk. CT ANGIOGRAM: The lung apices are clear. Evaluation of the neck soft tissues demonstrates no pathologic cervical lymphadenopathy or unexpected aerodigestive tract mass. The thyroid is homogeneous. The osseous structures demonstrate multilevel spondylosis, without evidence of fracture or aggressive osseous lesion. Three-vessel aortic arch with patent origins. The subclavian  arteries are partially visualized and appear patent. On the right, 0% stenosis is present involving the ICA origin by NASCET criteria, with small amount of calcified atherosclerotic change present. 0% narrowing is present on left. The vertebral arteries are normal in course and caliber bilaterally. Intracranially, the carotid siphons demonstrate no significant atherosclerotic narrowing. The anterior cerebral arteries are normal in course and caliber bilaterally. The right middle cerebral artery demonstrates no evidence of flow-limiting stenosis, large vessel occlusion or aneurysmal dilatation. The left middle cerebral artery similarly normal in course and caliber. The vertebrobasilar system is patent. The posterior cerebral arteries are normal in course and caliber bilaterally.     Impression: Impression: CT perfusion demonstrates expected deficits relating to area of volume loss in the right inferior frontal lobe. There is otherwise no evidence of acute core infarct or territorial ischemic tissue at risk. Essentially normal CT angiogram of the head and neck without evidence of flow-limiting stenosis, large vessel occlusion or aneurysmal dilatation. Electronically Signed: Yo Gil  4/1/2023 1:33 PM EDT  Workstation ID: [id number]    CT CEREBRAL PERFUSION WITH & WITHOUT CONTRAST    Result Date: 4/1/2023  CT CEREBRAL PERFUSION W WO CONTRAST, CT ANGIOGRAM NECK, CT ANGIOGRAM HEAD W AI ANALYSIS OF LVO Date of Exam: 4/1/2023 12:33 PM EDT Indication: Neuro deficit, acute, stroke suspected.  Comparison: None available. Technique: Axial CT images of the brain were obtained prior to and after the administration of 125 mL Isovue-370. Core blood volume, core blood flow, mean transit time, and Tmax images were obtained utilizing the Rapid software protocol. A limited CT angiogram of the head was also performed to measure the blood vessel density. Axial IV arterial phase contrast-enhanced CT angiogram of the head and neck.  Three-dimensional reconstructions were postprocessed. The radiation dose reduction device was turned on for each scan per the ALARA (As Low as Reasonably Achievable) protocol. Findings: CT perfusion: Matched defects are present correlating to a right inferior frontal chronic infarct. There is otherwise no evidence of acute territorial infarct or significant territorial ischemic tissue at risk. CT ANGIOGRAM: The lung apices are clear. Evaluation of the neck soft tissues demonstrates no pathologic cervical lymphadenopathy or unexpected aerodigestive tract mass. The thyroid is homogeneous. The osseous structures demonstrate multilevel spondylosis, without evidence of fracture or aggressive osseous lesion. Three-vessel aortic arch with patent origins. The subclavian arteries are partially visualized and appear patent. On the right, 0% stenosis is present involving the ICA origin by NASCET criteria, with small amount of calcified atherosclerotic change present. 0% narrowing is present on left. The vertebral arteries are normal in course and caliber bilaterally. Intracranially, the carotid siphons demonstrate no significant atherosclerotic narrowing. The anterior cerebral arteries are normal in course and caliber bilaterally. The right middle cerebral artery demonstrates no evidence of flow-limiting stenosis, large vessel occlusion or aneurysmal dilatation. The left middle cerebral artery similarly normal in course and caliber. The vertebrobasilar system is patent. The posterior cerebral arteries are normal in course and caliber bilaterally.     Impression: Impression: CT perfusion demonstrates expected deficits relating to area of volume loss in the right inferior frontal lobe. There is otherwise no evidence of acute core infarct or territorial ischemic tissue at risk. Essentially normal CT angiogram of the head and neck without evidence of flow-limiting stenosis, large vessel occlusion or aneurysmal dilatation.  Electronically Signed: Yo Gil  4/1/2023 1:33 PM EDT  Workstation ID: [id number]      Results for orders placed during the hospital encounter of 04/01/23    Adult Transthoracic Echo Complete W/ Cont if Necessary Per Protocol (With Agitated Saline)    Interpretation Summary  •  Left ventricular ejection fraction appears to be 61 - 65%.  •  Saline test results are negative.      Current medications:  Scheduled Meds:aspirin, 81 mg, Oral, Daily  atorvastatin, 80 mg, Oral, Nightly  [START ON 4/3/2023] clopidogrel, 75 mg, Oral, Daily  sodium chloride, 10 mL, Intravenous, Q12H  sodium chloride, 10 mL, Intravenous, Q12H      Continuous Infusions:   PRN Meds:.•  acetaminophen **OR** acetaminophen **OR** acetaminophen  •  calcium carbonate  •  ondansetron **OR** ondansetron  •  sodium chloride  •  sodium chloride  •  sodium chloride  •  sodium chloride  •  sodium chloride    Assessment & Plan   Assessment & Plan     Active Hospital Problems    Diagnosis  POA   • **Diplopia [H53.2]  Yes      Resolved Hospital Problems   No resolved problems to display.        Brief Hospital Course to date:  Louie Levy is a 48 y.o. male with history of traumatic injury to the head, occipital fracture, subdural and some arachnoid hematoma.  Patient has had no visual abnormalities since the head injury.  Patient started having blurry vision and diplopia the day prior to admission while at work.  His wife also noticed disconjugate gaze.  Symptoms continued and patient was brought to the emergency room  Labs are bland and imaging has not shown any acute process so far.  Stroke team is following.    This patient's problems and plans were partially entered by my partner and updated as appropriate by me 04/02/23.    All problems are new to me today.     Diplopia  Blurred vision  -Continue aspirin and Lipitor high-dose.  - s/p Plavix 300mg PO; continue Plavix 75mg PO daily  -MRI showing Right posterior yulissa infarct  -Fall precautions  - blurry  vision/diplopia still present this am  - stroke/neuro following  - PT/OT/SLP  - lipid panel with elevated triglycerides of 228, LDL of 109, total cholesterol of 191    Expected Discharge Location and Transportation: home  Expected Discharge   Expected Discharge Date and Time     Expected Discharge Date Expected Discharge Time    Apr 5, 2023            DVT prophylaxis:  Mechanical DVT prophylaxis orders are present.          CODE STATUS:   Code Status and Medical Interventions:   Ordered at: 04/01/23 4206     Code Status (Patient has no pulse and is not breathing):    CPR (Attempt to Resuscitate)     Medical Interventions (Patient has pulse or is breathing):    Full Support       Janey Alvarenga DO  04/02/23

## 2023-04-02 NOTE — THERAPY DISCHARGE NOTE
Acute Care - Speech Language Pathology Initial Evaluation/Discharge Summary   [place]     Patient Name: Louie eLvy  : 1975  MRN: [id number]  Today's Date: 2023               Admit Date: 2023     Visit Dx:    ICD-10-CM ICD-9-CM   1. Diplopia  H53.2 368.2   2. Ataxia  R27.0 781.3     Patient Active Problem List   Diagnosis   • ADHD (attention deficit hyperactivity disorder)   • Closed fracture of left clavicle   • Mood disorder   • Seasonal allergies   • Tinnitus of both ears   • Brain bleed   • History of alcohol use   • Annual physical exam   • Sinusitis   • Diplopia     Past Medical History:   Diagnosis Date   • ADHD (attention deficit hyperactivity disorder)    • Closed fracture of left clavicle    • Mood disorder    • Seasonal allergies    • Tinnitus of both ears 2020     Past Surgical History:   Procedure Laterality Date   • CLAVICLE SURGERY Left    • NASAL SEPTAL RECONSTRUCTION     • WISDOM TOOTH EXTRACTION         SLP Recommendation and Plan  SLP Diagnosis: functional speech/language skills, functional cognitive-linguistic skills (23 6195)                                                  Plan of Care Reviewed With: patient, sibling (23 1450)  Progress: improving (23 1450)      SLP EVALUATION (last 72 hours)     SLP SLC Evaluation     Row Name 23 [id number]                   Communication Assessment/Intervention    Document Type evaluation  -HG        Subjective Information no complaints  -HG        Patient Observations alert;cooperative;agree to therapy  -HG        Patient Effort excellent  -HG        Symptoms Noted During/After Treatment none  -HG           General Information    Patient Profile Reviewed yes  -HG        Precautions/Limitations, Vision other (see comments)  No prior vision deficits, double vision upon admission  -HG        Precautions/Limitations, Hearing WFL;for purposes of eval  -HG        Patient Level of Education Bachelor's Degree   -        Prior Level of Function-Communication WF  -        Plans/Goals Discussed with patient;family  -        Barriers to Rehab none identified  -        Patient's Goals for Discharge functional communication;functional cognition  -           Pain Scale: Numbers Pre/Post-Treatment    Pre/Posttreatment Pain Comment Pt notes slight discomfort in center of forehead, notes that it is seasonal, allergies.  -HG           Auditory Comprehension Assessment/Intervention    Auditory Comprehension (Communication) WFL  -HG           Reading Comprehension Assessment/Intervention    Reading Comprehension (Communication) WFL  -HG           Verbal Expression Assessment/Intervention    Verbal Expression WFL  -        Repetition sentences;[facility]  -        Phrase Completion automatic/predictable;L  -HG        Responsive Naming simple;[facility]  -HG        Confrontational Naming high frequency;[facility]  -        Spontaneous/Functional Words complex;[facility]  -        Sentence Formulation complex;[facility]  -        Conversational Discourse/Fluency WF  -           Graphic Expression Assessment/Intervention    Graphic Expression [facility]  -        Biographical Information name;address  -           Oral Motor Structure and Function    Oral Motor Structure and Function WF  -        Dentition Assessment natural, present and adequate  -        Mucosal Quality moist, healthy  -           Oral Musculature and Cranial Nerve Assessment    Oral Motor General Assessment WFL  -           Motor Speech Assessment/Intervention    Motor Speech Function WFL  -           Cursory Voice Assessment/Intervention    Quality and Resonance (Voice) WF  -           Cognitive Assessment Intervention- SLP    Cognitive Function (Cognition) WF  -        Orientation Status (Cognition) awareness of basic personal information;person;place;time;situation;[facility]  -        Memory (Cognitive) simple;immediate;delayed;unrelated;[facility]  -        Attention  (Cognitive) sustained;alternating;[facility]        Thought Organization (Cognitive) concrete divergent;abstract convergent;verbal sequencing;mental manipulation;[facility]        Reasoning (Cognitive) simple;deductive;absurdities;mental flexibility;[facility]        Problem Solving (Cognitive) simple;[facility]        Functional Math (Cognitive) simple;[facility]        Executive Function (Cognition) [facility]        Pragmatics (Communication) [facility]        Cognition, Comment Pt and family with no concerns regarding cog comm skills at this time.  -           SLP Evaluation Clinical Impressions    SLP Diagnosis functional speech/language skills;functional cognitive-linguistic skills  -           SLP Treatment Clinical Impressions    Care Plan Review evaluation/treatment results reviewed  -           Recommendations    Therapy Frequency ([facility] SLC) evaluation only  -              User Key  (r) = Recorded By, (t) = Taken By, (c) = Cosigned By    Initials Name Effective Dates     Azucena Kwon MS CCC-SLP 06/16/21 -                    EDUCATION  The patient has been educated in the following areas:     Cognitive Impairment Communication Impairment.          Time Calculation:      Time Calculation- SLP     Row Name 04/02/23 1450             Time Calculation- SLP    SLP Start Time 1345  -HG      SLP Received On 04/02/23  -         Untimed Charges    [id number] Eval Speech and Production w/ Language Minutes 45  -HG         Total Minutes    Untimed Charges Total Minutes 45  -HG       Total Minutes 45  -HG            User Key  (r) = Recorded By, (t) = Taken By, (c) = Cosigned By    Initials Name Provider Type     Azucena Kwon MS CCC-SLP Speech and Language Pathologist                Therapy Charges for Today     Code Description Service Date Service Provider Modifiers Qty    [id number]  ST EVAL SPEECH AND PROD W LANG  3 4/2/2023 Azucena Kwon MS CCC-SLP GN 1                     Azucena Kwon MS  CCC-SLP  4/2/2023

## 2023-04-02 NOTE — SIGNIFICANT NOTE
On my exam, patient has limited right eye abduction, with a fast beating nystagmus and some dizziness.     Restricted diffusion lesion in the right dorsal yulissa  -This could be an acute ischemic stroke versus demyelinating lesion versus low-grade pontine glioma.  There is disproportionate edema in the T2 flair compared to the restricted diffusion lesion.  This needs more comprehensive work-up especially in patient who is young.  -Discontinue Plavix, continue aspirin 81 for now  -We will obtain MRI brain with contrast to evaluate for any enhancement  -ESR and CRP was checked.  -Gradual normalization of blood pressure goals.     If the MRI brain with contrast does not show any enhancement, patient may need an outpatient lumbar puncture through interventional radiology.  We will coordinate the care.     Neurology will continue to follow with results.

## 2023-04-03 ENCOUNTER — READMISSION MANAGEMENT (OUTPATIENT)
Dept: CALL CENTER | Facility: HOSPITAL | Age: 48
End: 2023-04-03
Payer: COMMERCIAL

## 2023-04-03 ENCOUNTER — TELEPHONE (OUTPATIENT)
Dept: NEUROLOGY | Facility: CLINIC | Age: 48
End: 2023-04-03
Payer: COMMERCIAL

## 2023-04-03 ENCOUNTER — TELEPHONE (OUTPATIENT)
Dept: NEUROLOGY | Facility: CLINIC | Age: 48
End: 2023-04-03

## 2023-04-03 VITALS
OXYGEN SATURATION: 97 % | TEMPERATURE: 98.1 F | RESPIRATION RATE: 16 BRPM | SYSTOLIC BLOOD PRESSURE: 124 MMHG | HEART RATE: 83 BPM | DIASTOLIC BLOOD PRESSURE: 81 MMHG | BODY MASS INDEX: 30.79 KG/M2 | WEIGHT: 207.89 LBS | HEIGHT: 69 IN

## 2023-04-03 LAB
ALBUMIN SERPL-MCNC: 4 G/DL (ref 3.5–5.2)
ALBUMIN/GLOB SERPL: 1.8 G/DL
ALP SERPL-CCNC: 43 U/L (ref 39–117)
ALT SERPL W P-5'-P-CCNC: 15 U/L (ref 1–41)
ANION GAP SERPL CALCULATED.3IONS-SCNC: 12 MMOL/L (ref 5–15)
AST SERPL-CCNC: 17 U/L (ref 1–40)
BILIRUB SERPL-MCNC: 0.2 MG/DL (ref 0–1.2)
BUN SERPL-MCNC: 13 MG/DL (ref 6–20)
BUN/CREAT SERPL: 15.9 (ref 7–25)
CALCIUM SPEC-SCNC: 8.7 MG/DL (ref 8.6–10.5)
CHLORIDE SERPL-SCNC: 106 MMOL/L (ref 98–107)
CO2 SERPL-SCNC: 22 MMOL/L (ref 22–29)
CREAT SERPL-MCNC: 0.82 MG/DL (ref 0.76–1.27)
DEPRECATED RDW RBC AUTO: 42.3 FL (ref 37–54)
EGFRCR SERPLBLD CKD-EPI 2021: 109 ML/MIN/1.73
ERYTHROCYTE [DISTWIDTH] IN BLOOD BY AUTOMATED COUNT: 12.8 % (ref 12.3–15.4)
GLOBULIN UR ELPH-MCNC: 2.2 GM/DL
GLUCOSE SERPL-MCNC: 97 MG/DL (ref 65–99)
HCT VFR BLD AUTO: 43.9 % (ref 37.5–51)
HGB BLD-MCNC: 15 G/DL (ref 13–17.7)
MCH RBC QN AUTO: 31.1 PG (ref 26.6–33)
MCHC RBC AUTO-ENTMCNC: 34.2 G/DL (ref 31.5–35.7)
MCV RBC AUTO: 90.9 FL (ref 79–97)
PA ADP PRP-ACNC: 51 PRU
PLATELET # BLD AUTO: 245 10*3/MM3 (ref 140–450)
PMV BLD AUTO: 9.6 FL (ref 6–12)
POTASSIUM SERPL-SCNC: 4.4 MMOL/L (ref 3.5–5.2)
PROT SERPL-MCNC: 6.2 G/DL (ref 6–8.5)
RBC # BLD AUTO: 4.83 10*6/MM3 (ref 4.14–5.8)
SODIUM SERPL-SCNC: 140 MMOL/L (ref 136–145)
WBC NRBC COR # BLD: 7.04 10*3/MM3 (ref 3.4–10.8)

## 2023-04-03 PROCEDURE — 99233 SBSQ HOSP IP/OBS HIGH 50: CPT | Performed by: STUDENT IN AN ORGANIZED HEALTH CARE EDUCATION/TRAINING PROGRAM

## 2023-04-03 PROCEDURE — 99239 HOSP IP/OBS DSCHRG MGMT >30: CPT | Performed by: INTERNAL MEDICINE

## 2023-04-03 PROCEDURE — 85027 COMPLETE CBC AUTOMATED: CPT | Performed by: HOSPITALIST

## 2023-04-03 PROCEDURE — 80053 COMPREHEN METABOLIC PANEL: CPT | Performed by: HOSPITALIST

## 2023-04-03 PROCEDURE — 85576 BLOOD PLATELET AGGREGATION: CPT | Performed by: NURSE PRACTITIONER

## 2023-04-03 PROCEDURE — 25010000002 METHYLPREDNISOLONE PER 125 MG: Performed by: INTERNAL MEDICINE

## 2023-04-03 RX ORDER — ATORVASTATIN CALCIUM 80 MG/1
80 TABLET, FILM COATED ORAL NIGHTLY
Qty: 90 TABLET | Refills: 2 | Status: SHIPPED | OUTPATIENT
Start: 2023-04-03

## 2023-04-03 RX ORDER — ASPIRIN 325 MG
325 TABLET, DELAYED RELEASE (ENTERIC COATED) ORAL DAILY
Qty: 100 TABLET | Refills: 3 | Status: SHIPPED | OUTPATIENT
Start: 2023-04-03 | End: 2023-04-07

## 2023-04-03 RX ORDER — PREDNISONE 20 MG/1
60 TABLET ORAL DAILY
Qty: 12 TABLET | Refills: 0 | Status: SHIPPED | OUTPATIENT
Start: 2023-04-04 | End: 2023-04-03 | Stop reason: HOSPADM

## 2023-04-03 RX ORDER — PREDNISONE 20 MG/1
20 TABLET ORAL DAILY
Qty: 4 TABLET | Refills: 0 | Status: SHIPPED | OUTPATIENT
Start: 2023-04-14 | End: 2023-04-03 | Stop reason: HOSPADM

## 2023-04-03 RX ORDER — PANTOPRAZOLE SODIUM 40 MG/1
40 TABLET, DELAYED RELEASE ORAL DAILY
Qty: 30 TABLET | Refills: 0 | Status: SHIPPED | OUTPATIENT
Start: 2023-04-03 | End: 2023-05-03

## 2023-04-03 RX ORDER — PREDNISONE 20 MG/1
20 TABLET ORAL DAILY
Qty: 4 TABLET | Refills: 0 | Status: SHIPPED | OUTPATIENT
Start: 2023-04-09 | End: 2023-04-03 | Stop reason: HOSPADM

## 2023-04-03 RX ORDER — ASPIRIN 81 MG/1
81 TABLET ORAL DAILY
Status: DISCONTINUED | OUTPATIENT
Start: 2023-04-03 | End: 2023-04-03 | Stop reason: HOSPADM

## 2023-04-03 RX ORDER — PREDNISONE 20 MG/1
20 TABLET ORAL DAILY
Qty: 26 TABLET | Refills: 0 | Status: SHIPPED | OUTPATIENT
Start: 2023-04-04 | End: 2023-04-20

## 2023-04-03 RX ORDER — PREDNISONE 10 MG/1
10 TABLET ORAL DAILY
Qty: 4 TABLET | Refills: 0 | Status: SHIPPED | OUTPATIENT
Start: 2023-04-18 | End: 2023-04-03 | Stop reason: HOSPADM

## 2023-04-03 RX ADMIN — ASPIRIN 81 MG: 81 TABLET, COATED ORAL at 14:03

## 2023-04-03 RX ADMIN — POLYVINYL ALCOHOL, POVIDONE 2 DROP: 14; 6 SOLUTION/ DROPS OPHTHALMIC at 05:06

## 2023-04-03 RX ADMIN — SODIUM CHLORIDE 500 MG: 900 INJECTION INTRAVENOUS at 14:23

## 2023-04-03 NOTE — OUTREACH NOTE
Prep Survey    Flowsheet Row Responses   [facility] patient discharged from? [place]   Is LACE score < 7 ? Yes   Eligibility [facility]   Date of Admission 04/01/23   Date of Discharge 04/03/23   Discharge Disposition Home or Self Care   Discharge diagnosis Diplopia   Does the patient have one of the following disease processes/diagnoses(primary or secondary)? Other   Does the patient have Home health ordered? Yes   What is the Home health agency?  [facility]    Is there a DME ordered? No   Prep survey completed? Yes          Anne PÉREZ - Registered Nurse

## 2023-04-03 NOTE — DISCHARGE INSTR - ACTIVITY
Repeat MRI brain with and without contrast and MRA in one month and follow up with Dr. Hernandez in stroke clinic.   No driving until reevaluated.

## 2023-04-03 NOTE — CONSULTS
Diabetes Education  Assessment/Teaching    Patient Name:  Louie Levy  YOB: 1975  MRN: [id number]  Admit Date:  4/1/2023      Assessment Date:  4/3/2023    Order criteria not met for diabetes education consult. Current A1c is 5.2%, noted during chart review no history of DM and no home meds for DM.Patient does not qualify for the follow up stroke class based on the exclusion criteria listed. Thank you!    Electronically signed by:  Erin Bateman RN  04/03/23 10:11 EDT

## 2023-04-03 NOTE — PROGRESS NOTES
Stroke Progress Note       Chief Complaint:   Vision issues     Subjective    Subjective     Subjective:  No acute events overnight     Review of Systems   Constitutional: No fatigue  HENT: Negative for nosebleeds and rhinorrhea.    Eyes: Negative for redness.   Respiratory: Negative for cough.    Gastrointestinal: Negative for anal bleeding.   Endocrine: Negative for polydipsia.   Genitourinary: Negative for enuresis and urgency.   Musculoskeletal: Negative for joint swelling.   Neurological: Negative for tremors.   Psychiatric/Behavioral: Negative for hallucinations.   Objective      Temp:  [97.9 °F (36.6 °C)-98.5 °F (36.9 °C)] 98.1 °F (36.7 °C)  Heart Rate:  [] 70  Resp:  [14-18] 16  BP: (130-152)/(70-91) 152/88    NEURO    MENTAL STATUS: AAOx3, memory intact, fund of knowledge appropriate    LANG/SPEECH: Naming and repetition intact, fluent, follows 3-step commands    CRANIAL NERVES:   limited gaze of the right eye adduction, with left beating nystagmus,      MOTOR:  Moves all extremities equally    SENSORY: Normal to light touch all throughout     COORDINATION: Normal finger to nose and heel to shin, no tremor, no dysmetria    STATION: Not assessed due to patient condition    GAIT: Not assessed due to patient condition  Results Review:    I reviewed the patient's new clinical results.    Lab Results (last 24 hours)     Procedure Component Value Units Date/Time    P2Y12 Platelet Inhibition [[id number]] Collected: 04/03/23 0913    Specimen: Blood Updated: 04/03/23 1004     P2Y12 Reactivity Unit 51 PRU     Narrative:      P2Y12 Interpretation:  Pre-Drug normal reference range is 194-418 PRU.  Test results are reported in P2Y12 reaction units (PRU). This measures the extent of platelet aggregation in the presence of P2Y12 inhibitor drugs, such as clopidogrel (Plavix), prasugrel (Effient), ticagrelor (Brilinta), ticlopidine (Ticlid).  P2Y12 values <194 PRU (low end of reference range) are specific evidence of a  P2Y12 inhibitor effect.  Patients who have been treated with Glycoprotein IIb/IIIa inhibitors should not be tested until platelet function has recovered. This time period is approximately 14 days after discontinuation of abciximab (ReoPro) and up to 48 hours after discontinuation of eptifibatide (Integrilin) and tirofiban (Aggrastat).   The P2Y12 test results should be interpreted in conjunction with other clinical and lab data available to the clinician.    Comprehensive Metabolic Panel [[id number]] Collected: 04/03/23 0619    Specimen: Blood Updated: 04/03/23 0723     Glucose 97 mg/dL      BUN 13 mg/dL      Creatinine 0.82 mg/dL      Sodium 140 mmol/L      Potassium 4.4 mmol/L      Comment: Slight hemolysis detected by analyzer. Results may be affected.        Chloride 106 mmol/L      CO2 22.0 mmol/L      Calcium 8.7 mg/dL      Total Protein 6.2 g/dL      Albumin 4.0 g/dL      ALT (SGPT) 15 U/L      AST (SGOT) 17 U/L      Alkaline Phosphatase 43 U/L      Total Bilirubin 0.2 mg/dL      Globulin 2.2 gm/dL      Comment: Calculated Result        A/G Ratio 1.8 g/dL      BUN/Creatinine Ratio 15.9     Anion Gap 12.0 mmol/L      eGFR 109.0 mL/min/1.73     Narrative:      GFR Normal >60  Chronic Kidney Disease <60  Kidney Failure <15      CBC (No Diff) [[id number]]  (Normal) Collected: 04/03/23 0619    Specimen: Blood Updated: 04/03/23 0646     WBC 7.04 10*3/mm3      RBC 4.83 10*6/mm3      Hemoglobin 15.0 g/dL      Hematocrit 43.9 %      MCV 90.9 fL      MCH 31.1 pg      MCHC 34.2 g/dL      RDW 12.8 %      RDW-SD 42.3 fl      MPV 9.6 fL      Platelets 245 10*3/mm3         CT Angiogram Neck    Result Date: 4/1/2023  Impression: CT perfusion demonstrates expected deficits relating to area of volume loss in the right inferior frontal lobe. There is otherwise no evidence of acute core infarct or territorial ischemic tissue at risk. Essentially normal CT angiogram of the head and neck without evidence of flow-limiting stenosis,  large vessel occlusion or aneurysmal dilatation. Electronically Signed: Yo Gil  4/1/2023 1:33 PM EDT  Workstation ID: [id number]    MRI Brain Without Contrast    Result Date: 4/1/2023  Impression: 1.Small acute or subacute infarct within right posterior yulissa. Some petechial staining within infarct, but no large hemorrhagic transformation. 2.Surrounding gliosis within more peripheral right yulissa. 3.Encephalomalacia within right frontal lobe. 4.Findings suggestive of minimal chronic small vessel ischemic disease. 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 Localized infarct remains an additional consideration. Remainder of exam is unchanged and there is no specific evidence of demyelinating process. Electronically Signed: Yo Gil  4/3/2023 5:57 AM EDT  Workstation ID: [id number]    XR Chest 1 View    Result Date: 4/1/2023  Impression: 1.No acute pulmonary process. Electronically Signed: Esau Cullen  4/1/2023 1:37 PM EDT  Workstation ID: [id number]    CT Angiogram Head w AI Analysis of LVO    Result Date: 4/1/2023  Impression: CT perfusion demonstrates expected deficits relating to area of volume loss in the right inferior frontal lobe. There is otherwise no evidence of acute core infarct or territorial ischemic tissue at risk. Essentially normal CT angiogram of the head and neck without evidence of flow-limiting stenosis, large vessel occlusion or aneurysmal dilatation. Electronically  Signed: Yo Gil  4/1/2023 1:33 PM EDT  Workstation ID: [id number]    CT CEREBRAL PERFUSION WITH & WITHOUT CONTRAST    Result Date: 4/1/2023  Impression: CT perfusion demonstrates expected deficits relating to area of volume loss in the right inferior frontal lobe. There is otherwise no evidence of acute core infarct or territorial ischemic tissue at risk. Essentially normal CT angiogram of the head and neck without evidence of flow-limiting stenosis, large vessel occlusion or aneurysmal dilatation. Electronically Signed: Yo Irizarryord  4/1/2023 1:33 PM EDT  Workstation ID: [id number]    Results for orders placed during the hospital encounter of 04/01/23    Adult Transthoracic Echo Complete W/ Cont if Necessary Per Protocol (With Agitated Saline)    Interpretation Summary  •  Left ventricular ejection fraction appears to be 61 - 65%.  •  Saline test results are negative.            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  Discussed with Dr. South from neurosurgery, who will follow up with the patient in the clinic in 2 weeks. In interim, I recommend a short course of steroids. Aspirin 81 and Lipitor 80.  Repeat MRI brain W WO and MRA in one month and to be followed by Dr. Hernandez in stroke clinic.          Chandan Hernandez MD  04/03/23  12:53 EDT

## 2023-04-03 NOTE — TELEPHONE ENCOUNTER
Caller: [facility]      Best call back number: [phone]    New or established patient?  [x] New  [] Established    Date of discharge:  04.03.23    Facility discharged from: [facility]     Diagnosis/Symptoms: STROKE?    Length of stay (If applicable): 2 DAYS     Specialty Only: Did you see a [facility] provider?    [x] Yes  [] No  If so, who? [facility]    PT NOTE SAY F.U WITH DR DALTON. CALLED THO AT OFFICE AS WE CAN 'T SCHED FOR HIM SHE WILL CK AND CALL PT.     PLEASE ADVISE

## 2023-04-03 NOTE — DISCHARGE SUMMARY
[facility] Medicine Services  DISCHARGE SUMMARY    Patient Name: Luoie Levy  : 1975  MRN: [id number]    Date of Admission: 2023 11:49 AM  Date of Discharge: 4/3/2023  Primary Care Physician: Trina Feldman DO    Consults     No orders found from 3/3/2023 to 2023.          Hospital Course     Presenting Problem:   Diplopia [H53.2]  Diplopia [H53.2]    Active Hospital Problems    Diagnosis  POA   • **Diplopia [H53.2]  Yes      Resolved Hospital Problems   No resolved problems to display.      Discharge diagnosis:  · Acute right posterior pontine infarction versus demyelinating disease versus low-grade pontine glioma versus hemangioma  · Diplopia and blurring of vision  · Dyslipidemia  · ADHD    Hospital Course:  Louie Levy is a 48 y.o. male with history of traumatic injury to the head, occipital fracture, subdural and some arachnoid hematoma.  Patient has had no visual abnormalities since the head injury.  Patient started having blurry vision and diplopia the day prior to admission while at work.  His wife also noticed disconjugate gaze.  Seen by neurology team.  MRI brain with IV contrast showed right posterior pontine restricted diffusion concerning for acute stroke versus MS versus gliomas or hemangioma.  Dr. Hernandez discussed with neurosurgery team and the plan is for the patient to receive pulse dose steroids 500 mg methylprednisolone on day of discharge and go home with taper dose steroids over the next 4 weeks and follow-up with neurology clinic for repeat MRI brain and possible LP.  Dr. Hernandez discussed with neurosurgery team Dr. Valdez and the patient will follow-up with him as outpatient.  Patient was advised to stop taking Vyvanse (for his ADHD) which is associated with increased risk of stroke till work-up for his pontine lesion is complete and he will follow with his psychiatrist as outpatient afterwards.  He was discharged on full dose aspirin  325 mg daily and high intensity statins with Lipitor 80 mg at bedtime.  Overall, he remained stable and was discharged in a stable condition      Discharge Follow Up Recommendations for outpatient labs/diagnostics:  PCP in 1 week  Patient to arrange for psychiatry follow-up after discharge  /stroke neurology in 1 week  Dr. Valdez on 4/18/2023    Day of Discharge     HPI:   I have seen and evaluated the patient this morning.  Comfortable in bed.  Hemodynamics are stable.  No headache.  Still complaining of diplopia on looking to the left.  Otherwise no acute complaints    Review of Systems  General : no fevers, chills  CVS: No chest pain, palpitations  Respiratory: No cough, dyspnea  GI: No N/V/D, abd pain  10 point review of systems is negative except for what is mentioned in HPI    Vital Signs:   Temp:  [97.9 °F (36.6 °C)-98.5 °F (36.9 °C)] 98.1 °F (36.7 °C)  Heart Rate:  [] 70  Resp:  [14-18] 16  BP: (130-152)/(70-91) 152/88      Physical Exam:  General: Comfortable, not in distress, conversant and cooperative  Head: Atraumatic and normocephalic  Eyes: No Icterus. No pallor  Ears:  Ears appear intact with no abnormalities noted  Throat: No oral lesions, no thrush  Neck: Supple, trachea midline  Lungs: Clear to auscultation bilaterally, equal air entry, no wheezing or crackles  Heart:  Normal S1 and S2, no murmur, no gallop, No JVD, no lower extremity swelling  Abdomen:  Soft, no tenderness, no organomegaly, normal bowel sounds, no organomegaly  Extremities: pulses equal bilaterally  Skin: No bleeding, bruising or rash, normal skin turgor and elasticity  Neurologic: limited gaze of the right eye adduction, with left beating nystagmus, normal motor and sensory functions in all 4 extremities  Psych: Alert and oriented x 3, normal mood    Pertinent  and/or Most Recent Results     LAB RESULTS:      Lab 04/03/23  0619 04/02/23  1222 04/01/23  1200   WBC 7.04  --  6.10   HEMOGLOBIN 15.0  --  16.3    HEMATOCRIT 43.9  --  48.7   PLATELETS 245  --  280   NEUTROS ABS  --   --  3.73   IMMATURE GRANS (ABS)  --   --  0.02   LYMPHS ABS  --   --  1.79   MONOS ABS  --   --  0.49   EOS ABS  --   --  0.02   MCV 90.9  --  92.1   SED RATE  --  5  --    CRP  --  <0.30  --          Lab 04/03/23  0619 04/02/23  0341 04/01/23  1200   SODIUM 140  --  140   POTASSIUM 4.4  --  4.3   CHLORIDE 106  --  103   CO2 22.0  --  26.0   ANION GAP 12.0  --  11.0   BUN 13  --  12   CREATININE 0.82  --  0.80   EGFR 109.0  --  109.8   GLUCOSE 97  --  113*   CALCIUM 8.7  --  9.5   MAGNESIUM  --   --  2.4   HEMOGLOBIN A1C  --  5.20  --          Lab 04/03/23  0619 04/01/23  1200   TOTAL PROTEIN 6.2 7.9   ALBUMIN 4.0 4.7   GLOBULIN 2.2 3.2   ALT (SGPT) 15 21   AST (SGOT) 17 20   BILIRUBIN 0.2 0.5   ALK PHOS 43 47         Lab 04/01/23  1200   HSTROP T <6         Lab 04/02/23  0341   CHOLESTEROL 191   LDL CHOL 109*   HDL CHOL 43   TRIGLYCERIDES 228*             Brief Urine Lab Results  (Last result in the past 365 days)      Color   Clarity   Blood   Leuk Est   Nitrite   Protein   CREAT   Urine HCG        04/01/23 2047 Yellow   Cloudy   Negative   Negative   Negative   Negative               Microbiology Results (last 10 days)     ** No results found for the last 240 hours. **          Adult Transthoracic Echo Complete W/ Cont if Necessary Per Protocol (With Agitated Saline)    Result Date: 4/2/2023  •  Left ventricular ejection fraction appears to be 61 - 65%. •  Saline test results are negative.     CT Head Without Contrast    Result Date: 4/1/2023  CT HEAD WO CONTRAST Date of Exam: 4/1/2023 11:59 AM EDT Indication: DIZZINESS, BLURRY VISION, HX OF BLEEDS. Comparison: None available. Technique: Axial CT images were obtained of the head without contrast administration.  Reconstructed coronal and sagittal images were also obtained. Automated exposure control and iterative construction methods were used. Findings: There is no intracranial hemorrhage.  There is no mass or midline shift. There is remote infarct in the right frontal lobe with encephalomalacia. There are no focal hypodensities to suggest acute or subacute infarct. The gray-white matter differentiation is intact. Brain volume is appropriate for patient's age. Mastoid air cells and paranasal sinuses are well aerated.     Impression: No acute intracranial abnormality on head CT. Remote right frontal infarct. MRI is more sensitive to evaluate for acute or subacute infarct. Electronically Signed: Ingrid Rubio  4/1/2023 12:14 PM EDT  Workstation ID: [id number]    CT Angiogram Neck    Result Date: 4/1/2023  CT CEREBRAL PERFUSION W WO CONTRAST, CT ANGIOGRAM NECK, CT ANGIOGRAM HEAD W AI ANALYSIS OF LVO Date of Exam: 4/1/2023 12:33 PM EDT Indication: Neuro deficit, acute, stroke suspected.  Comparison: None available. Technique: Axial CT images of the brain were obtained prior to and after the administration of 125 mL Isovue-370. Core blood volume, core blood flow, mean transit time, and Tmax images were obtained utilizing the Rapid software protocol. A limited CT angiogram of the head was also performed to measure the blood vessel density. Axial IV arterial phase contrast-enhanced CT angiogram of the head and neck. Three-dimensional reconstructions were postprocessed. The radiation dose reduction device was turned on for each scan per the ALARA (As Low as Reasonably Achievable) protocol. Findings: CT perfusion: Matched defects are present correlating to a right inferior frontal chronic infarct. There is otherwise no evidence of acute territorial infarct or significant territorial ischemic tissue at risk. CT ANGIOGRAM: The lung apices are clear. Evaluation of the neck soft tissues demonstrates no pathologic cervical lymphadenopathy or unexpected aerodigestive tract mass. The thyroid is homogeneous. The osseous structures demonstrate multilevel spondylosis, without evidence of fracture or aggressive osseous lesion.  Three-vessel aortic arch with patent origins. The subclavian arteries are partially visualized and appear patent. On the right, 0% stenosis is present involving the ICA origin by NASCET criteria, with small amount of calcified atherosclerotic change present. 0% narrowing is present on left. The vertebral arteries are normal in course and caliber bilaterally. Intracranially, the carotid siphons demonstrate no significant atherosclerotic narrowing. The anterior cerebral arteries are normal in course and caliber bilaterally. The right middle cerebral artery demonstrates no evidence of flow-limiting stenosis, large vessel occlusion or aneurysmal dilatation. The left middle cerebral artery similarly normal in course and caliber. The vertebrobasilar system is patent. The posterior cerebral arteries are normal in course and caliber bilaterally.     Impression: CT perfusion demonstrates expected deficits relating to area of volume loss in the right inferior frontal lobe. There is otherwise no evidence of acute core infarct or territorial ischemic tissue at risk. Essentially normal CT angiogram of the head and neck without evidence of flow-limiting stenosis, large vessel occlusion or aneurysmal dilatation. Electronically Signed: Yo Gil  4/1/2023 1:33 PM EDT  Workstation ID: [id number]    MRI Brain Without Contrast    Result Date: 4/1/2023  MRI BRAIN WO CONTRAST Date of Exam: 4/1/2023 9:08 PM EDT Indication: Stroke, follow up.  Comparison: CT head from earlier today Technique:  Routine multiplanar/multisequence sequence images of the brain were obtained without contrast administration. Findings: There is mild restricted diffusion within the right posterior yulissa, which could represent an acute or subacute infarct. There is some petechial staining within the infarct, but no large hemorrhagic transformation. There is surrounding gliosis within the more peripheral right yulissa. The ventricles are stable in caliber, with no  midline shift. The basal cisterns appear patent. There is encephalomalacia within the right frontal lobe. Subtle foci of periventricular and subcortical white matter FLAIR hyperintensities are nonspecific, but likely the sequela of minimal chronic small ischemic disease. The midline structures appear intact. The globes and orbits appear intact. The intracranial vascular flow-voids appear patent.     Impression: 1.Small acute or subacute infarct within right posterior yulissa. Some petechial staining within infarct, but no large hemorrhagic transformation. 2.Surrounding gliosis within more peripheral right yulissa. 3.Encephalomalacia within right frontal lobe. 4.Findings suggestive of minimal chronic small vessel ischemic disease. Electronically Signed: Audie Manuel  4/1/2023 9:53 PM EDT  Workstation ID: [id number]    MRI Brain With & Without Contrast    Result Date: 4/3/2023  MRI BRAIN W WO CONTRAST Date of Exam: 4/2/2023 8:06 PM EDT Indication: Demyelinating disease suspected (Ped 0-17y) MRI brain W contrast-.  Comparison: 4/1/2023 Technique:  Routine multiplanar/multisequence sequence images of the brain were obtained before and after the uneventful administration of  17 mL Multihance. Findings: Stable small focus of diffusion restriction within the right dorsal yulissa . No new areas of restricted diffusion are present. Midline structures are normal and the craniocervical junction appears satisfactory. There is unchanged surrounding T2/FLAIR hyperintensity, somewhat disproportionate and extending to the right cerebral peduncle. Right inferior frontal lobe gliosis and encephalomalacia is unchanged, appearing chronic. A faint area of linear vascular enhancement is seen within the right dorsal yulissa correlating with the focus of diffusion signal abnormality. There is no additional abnormal enhancement. A few small additional areas of subcortical white matter T2 hyperintensity are present, favoring typical sequela of  microvascular ischemia, without definite juxtacortical involvement or fourth angle relationship to the corpus callosum to further suggest sequela of demyelinating process. The ventricles are normal in size and configuration. The orbits are normal. The paranasal sinuses are grossly clear.     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 Localized infarct remains an additional consideration. Remainder of exam is unchanged and there is no specific evidence of demyelinating process. Electronically Signed: Yo Gil  4/3/2023 5:57 AM EDT  Workstation ID: [id number]    XR Chest 1 View    Result Date: 4/1/2023  XR CHEST 1 VW Date of Exam: 4/1/2023 12:56 PM EDT Indication: Weak/Dizzy/AMS triage protocol. Comparison: None available. Findings: The heart is not enlarged. The lungs seem clear. There are no pleural effusions. There is a screw-plate device noted along the left clavicle.     Impression: 1.No acute pulmonary process. Electronically Signed: Esau Cullen  4/1/2023 1:37 PM EDT  Workstation ID: [id number]    CT Angiogram Head w AI Analysis of LVO    Result Date: 4/1/2023  CT CEREBRAL PERFUSION W WO CONTRAST, CT ANGIOGRAM NECK, CT ANGIOGRAM HEAD W AI ANALYSIS OF LVO Date of Exam: 4/1/2023 12:33 PM EDT Indication: Neuro deficit, acute, stroke suspected.  Comparison: None available. Technique: Axial CT images of the brain were obtained prior to and after the administration of 125 mL Isovue-370. Core blood volume, core blood flow, mean transit time, and Tmax images were obtained utilizing the Rapid software protocol. A limited CT angiogram of the head  was also performed to measure the blood vessel density. Axial IV arterial phase contrast-enhanced CT angiogram of the head and neck. Three-dimensional reconstructions were postprocessed. The radiation dose reduction device was turned on for each scan per the ALARA (As Low as Reasonably Achievable) protocol. Findings: CT perfusion: Matched defects are present correlating to a right inferior frontal chronic infarct. There is otherwise no evidence of acute territorial infarct or significant territorial ischemic tissue at risk. CT ANGIOGRAM: The lung apices are clear. Evaluation of the neck soft tissues demonstrates no pathologic cervical lymphadenopathy or unexpected aerodigestive tract mass. The thyroid is homogeneous. The osseous structures demonstrate multilevel spondylosis, without evidence of fracture or aggressive osseous lesion. Three-vessel aortic arch with patent origins. The subclavian arteries are partially visualized and appear patent. On the right, 0% stenosis is present involving the ICA origin by NASCET criteria, with small amount of calcified atherosclerotic change present. 0% narrowing is present on left. The vertebral arteries are normal in course and caliber bilaterally. Intracranially, the carotid siphons demonstrate no significant atherosclerotic narrowing. The anterior cerebral arteries are normal in course and caliber bilaterally. The right middle cerebral artery demonstrates no evidence of flow-limiting stenosis, large vessel occlusion or aneurysmal dilatation. The left middle cerebral artery similarly normal in course and caliber. The vertebrobasilar system is patent. The posterior cerebral arteries are normal in course and caliber bilaterally.     Impression: CT perfusion demonstrates expected deficits relating to area of volume loss in the right inferior frontal lobe. There is otherwise no evidence of acute core infarct or territorial ischemic tissue at risk. Essentially normal CT angiogram  of the head and neck without evidence of flow-limiting stenosis, large vessel occlusion or aneurysmal dilatation. Electronically Signed: Yo Gil  4/1/2023 1:33 PM EDT  Workstation ID: [id number]    CT CEREBRAL PERFUSION WITH & WITHOUT CONTRAST    Result Date: 4/1/2023  CT CEREBRAL PERFUSION W WO CONTRAST, CT ANGIOGRAM NECK, CT ANGIOGRAM HEAD W AI ANALYSIS OF LVO Date of Exam: 4/1/2023 12:33 PM EDT Indication: Neuro deficit, acute, stroke suspected.  Comparison: None available. Technique: Axial CT images of the brain were obtained prior to and after the administration of 125 mL Isovue-370. Core blood volume, core blood flow, mean transit time, and Tmax images were obtained utilizing the Rapid software protocol. A limited CT angiogram of the head was also performed to measure the blood vessel density. Axial IV arterial phase contrast-enhanced CT angiogram of the head and neck. Three-dimensional reconstructions were postprocessed. The radiation dose reduction device was turned on for each scan per the ALARA (As Low as Reasonably Achievable) protocol. Findings: CT perfusion: Matched defects are present correlating to a right inferior frontal chronic infarct. There is otherwise no evidence of acute territorial infarct or significant territorial ischemic tissue at risk. CT ANGIOGRAM: The lung apices are clear. Evaluation of the neck soft tissues demonstrates no pathologic cervical lymphadenopathy or unexpected aerodigestive tract mass. The thyroid is homogeneous. The osseous structures demonstrate multilevel spondylosis, without evidence of fracture or aggressive osseous lesion. Three-vessel aortic arch with patent origins. The subclavian arteries are partially visualized and appear patent. On the right, 0% stenosis is present involving the ICA origin by NASCET criteria, with small amount of calcified atherosclerotic change present. 0% narrowing is present on left. The vertebral arteries are normal in course and  caliber bilaterally. Intracranially, the carotid siphons demonstrate no significant atherosclerotic narrowing. The anterior cerebral arteries are normal in course and caliber bilaterally. The right middle cerebral artery demonstrates no evidence of flow-limiting stenosis, large vessel occlusion or aneurysmal dilatation. The left middle cerebral artery similarly normal in course and caliber. The vertebrobasilar system is patent. The posterior cerebral arteries are normal in course and caliber bilaterally.     Impression: CT perfusion demonstrates expected deficits relating to area of volume loss in the right inferior frontal lobe. There is otherwise no evidence of acute core infarct or territorial ischemic tissue at risk. Essentially normal CT angiogram of the head and neck without evidence of flow-limiting stenosis, large vessel occlusion or aneurysmal dilatation. Electronically Signed: Yo Gil  4/1/2023 1:33 PM EDT  Workstation ID: [id number]        Results for orders placed during the hospital encounter of 04/01/23    Adult Transthoracic Echo Complete W/ Cont if Necessary Per Protocol (With Agitated Saline)    Interpretation Summary  •  Left ventricular ejection fraction appears to be 61 - 65%.  •  Saline test results are negative.      Plan for Follow-up of Pending Labs/Results:     Discharge Details        Discharge Medications      New Medications      Instructions Start Date   aspirin  MG tablet   325 mg, Oral, Daily      atorvastatin 80 MG tablet  Commonly known as: LIPITOR   80 mg, Oral, Nightly         Continue These Medications      Instructions Start Date   lisdexamfetamine 50 MG capsule  Commonly known as: VYVANSE   50 mg, Oral, Every Morning             No Known Allergies      Discharge Disposition:  Home or Self Care    Diet:  Hospital:  Diet Order   Procedures   • Diet: Regular/House Diet, Cardiac Diets; Healthy Heart (2-3 Na+); Texture: Regular Texture (IDDSI 7); Fluid Consistency: Thin  (IDDSI 0)       Activity:  As tolerated    Restrictions or Other Recommendations:  No driving till cleared by neurology       CODE STATUS:    Code Status and Medical Interventions:   Ordered at: 04/01/23 1857     Code Status (Patient has no pulse and is not breathing):    CPR (Attempt to Resuscitate)     Medical Interventions (Patient has pulse or is breathing):    Full Support       No future appointments.              Charles Valadez MD  04/03/23      Time Spent on Discharge:  I spent 60 minutes on this discharge activity which included: face-to-face encounter with the patient, reviewing the data in the system, coordination of the care with the nursing staff as well as consultants, documentation, and entering orders.

## 2023-04-03 NOTE — CASE MANAGEMENT/SOCIAL WORK
Continued Stay Note  [facility]     Patient Name: Louie Levy  MRN: [id number]  Today's Date: 4/3/2023    Admit Date: 4/1/2023    Plan: Home   Discharge Plan     Row Name 04/03/23 1047       Plan    Plan Home    Patient/Family in Agreement with Plan yes    Plan Comments Spoke with patient and spouse at bedside; therapy has recommended a rolling walker at discharge and patient is agreeable.  Referral for RW called to Te with [employer], he will deliver to patient's room prior to discharge.  Patient denies other needs.  Spouse will provide transportation at discharge.               Discharge Codes    No documentation.               Expected Discharge Date and Time     Expected Discharge Date Expected Discharge Time    Apr 3, 2023             Samantha Zapien RN

## 2023-04-03 NOTE — TELEPHONE ENCOUNTER
Caller: [employer]    Relationship to patient: N/A    New or established patient?  [x] New  [] Established    Date of discharge: 4/3/23    Facility discharged from:      Diagnosis/Symptoms: ATAXIA+DIPLOPIA    Length of stay (If applicable): 1 DAY    Specialty Only: Did you see a [facility] health provider?    [x] Yes  [] No  If so, who? [facility]     TELEPHONED TO SCHED 1MOS HOSP F/U FOR PATIENT W/ [facility] FOR DX:ATAXIA+DIPLOPIA    THEY HUNG UP BEFORE I COULD SEEK ADVISE FOR SCHEDULING.    OK TO SCHED.FOR DX:ATAXIA+DIPLOPIA AS WE DO NOT SEE FOR ATAXIA ACCORDING TO OUR PROTOCOL & DIPLOPIA DX IS NOT LISTED AS DX WE TREAT FOR    PLEASE CALL & ADVISE    THANK YOU

## 2023-04-04 ENCOUNTER — TRANSITIONAL CARE MANAGEMENT TELEPHONE ENCOUNTER (OUTPATIENT)
Dept: CALL CENTER | Facility: HOSPITAL | Age: 48
End: 2023-04-04
Payer: COMMERCIAL

## 2023-04-04 NOTE — OUTREACH NOTE
Call Center TCM Note    Flowsheet Row Responses   [facility] patient discharged from? [place]   Does the patient have one of the following disease processes/diagnoses(primary or secondary)? Other   TCM attempt successful? Yes   Call start time 1358   Call end time 1402   Discharge diagnosis Diplopia   Meds reviewed with patient/caregiver? Yes   Is the patient having any side effects they believe may be caused by any medication additions or changes? No   Does the patient have all medications ordered at discharge? Yes   Is the patient taking all medications as directed (includes completed medication regime)? Yes   Comments 4/7/23 at 11:00am.    Does the patient have an appointment with their PCP within 7 days of discharge? Yes   Has home health visited the patient within 72 hours of discharge? N/A   Psychosocial issues? No   Did the patient receive a copy of their discharge instructions? Yes   Nursing interventions Reviewed instructions with patient   What is the patient's perception of their health status since discharge? Improving   TCM call completed? Yes   Call end time 1402   Would this patient benefit from a Referral to Amb Social Work? No   Is the patient interested in additional calls from an ambulatory ?  NOTE:  applies to high risk patients requiring additional follow-up. No          Cassidy Karimi RN    4/4/2023, 14:03 EDT

## 2023-04-04 NOTE — OUTREACH NOTE
Call Center TCM Note    Flowsheet Row Responses   [facility] patient discharged from? [place]   Does the patient have one of the following disease processes/diagnoses(primary or secondary)? Other   TCM attempt successful? No   Unsuccessful attempts Attempt 1          Cassidy Karimi RN    4/4/2023, 12:58 EDT

## 2023-04-06 NOTE — PROGRESS NOTES
"Chief Complaint  Hospital Follow Up Visit     History of Present Illness       The patient was d/c on 4/3. He was admitted after having blurry vision. Neurology was consulted. MRI showed what was concerning for acute stroke and cavernous malformation. He was placed on steroids by neuro.   The patient is to have outpatient neurosurgery follow up. Vyvanse was stopped. He was d/c on asa 325 daily and statin daily. He has been having trouble sleeping and would like something for sleep. He continues to have blurry vision that he notes is unchanged since the hospital. He has been walking with a cane, but has not had to use it today. He has been walking on his treadmill.  No falls.  He has no other complaints aside from weight gain. He has trazodone at his house and he has taken a couple doses and would like a prescription for this.        The following portions of the patient's history were reviewed and updated as appropriate: allergies, current medications, past family history, past medical history, past social history, past surgical history, and problem list.    OBJECTIVE:  /82   Pulse 87   Temp 96.8 °F (36 °C)   Resp 22   Ht 175.3 cm (69.02\")   Wt 98.9 kg (218 lb)   SpO2 96%   BMI 32.17 kg/m²       Physical Exam  Constitutional:       General: He is not in acute distress.     Appearance: Normal appearance.   HENT:      Head: Normocephalic and atraumatic.   Eyes:      Extraocular Movements: Extraocular movements intact.      Pupils: Pupils are equal, round, and reactive to light.   Cardiovascular:      Rate and Rhythm: Normal rate and regular rhythm.      Heart sounds: No murmur heard.  Pulmonary:      Effort: Pulmonary effort is normal. No respiratory distress.      Breath sounds: Normal breath sounds. No stridor. No wheezing, rhonchi or rales.   Skin:     Findings: No rash.   Neurological:      General: No focal deficit present.      Mental Status: He is alert and oriented to person, place, and time.      " "Cranial Nerves: No cranial nerve deficit.      Sensory: No sensory deficit.      Motor: No weakness.      Coordination: Coordination abnormal.      Gait: Gait abnormal.      Comments: Cognition seems normal   Psychiatric:         Mood and Affect: Mood normal.                    Assessment and Plan   Diagnoses and all orders for this visit:    1. Hospital discharge follow-up (Primary)  -     Hepatic Function Panel; Future    Other orders  -     aspirin 81 MG EC tablet; Take 1 tablet by mouth Daily.  Dispense: 1 tablet; Refill: 0      Reviewed d/c note, cbc, cmp, lipid, a1c and imaging.   He has follow up with neurosurgery scheduled on the 18th.   Neurosurgery's last note calls for 81 mg asa daily with statin.   Advised him to stop by lab in 6 weeks for lft check on statin.   He has taken trazodone from a family member since d/c and has not had any side effect from this. He would like prescription for this to help with sleep. Counseled on sedation, drowsiness confusion falls on this. Follow this up with neurosurgery.  He declines pt ot. Given balance and cognition issues he does not feel he can return to work anytime soon. He says he feels his work is okay with him being off for the next couple weeks as he has spoken with them.       \"aReturn in about 6 months (around 10/7/2023).       Trina Feldman D.O.  [facility] [place] [facility] [place]     "

## 2023-04-07 ENCOUNTER — OFFICE VISIT (OUTPATIENT)
Dept: FAMILY MEDICINE CLINIC | Facility: CLINIC | Age: 48
End: 2023-04-07
Payer: COMMERCIAL

## 2023-04-07 VITALS
TEMPERATURE: 96.8 F | OXYGEN SATURATION: 96 % | HEART RATE: 87 BPM | WEIGHT: 218 LBS | DIASTOLIC BLOOD PRESSURE: 82 MMHG | HEIGHT: 69 IN | RESPIRATION RATE: 22 BRPM | SYSTOLIC BLOOD PRESSURE: 144 MMHG | BODY MASS INDEX: 32.29 KG/M2

## 2023-04-07 DIAGNOSIS — Z09 HOSPITAL DISCHARGE FOLLOW-UP: Primary | ICD-10-CM

## 2023-04-07 RX ORDER — ASPIRIN 81 MG/1
81 TABLET ORAL DAILY
Qty: 1 TABLET | Refills: 0
Start: 2023-04-07 | End: 2024-04-06

## 2023-04-07 RX ORDER — ASPIRIN 81 MG/1
325 TABLET ORAL DAILY
Qty: 1 TABLET | Refills: 0
Start: 2023-04-07 | End: 2023-04-07

## 2023-04-07 RX ORDER — TRAZODONE HYDROCHLORIDE 50 MG/1
50 TABLET ORAL NIGHTLY
Qty: 90 TABLET | Refills: 0 | Status: SHIPPED | OUTPATIENT
Start: 2023-04-07

## 2023-04-07 NOTE — LETTER
April 7, 2023     Patient: Louie Levy   YOB: 1975   Date of Visit: 4/7/2023       To Whom It May Concern:    It is my medical opinion that Louie Levy should be excused from work from Wednesday March the 29th through April the 18th.          Sincerely,        Trina Feldman DO    CC: No Recipients

## 2023-04-21 ENCOUNTER — TELEPHONE (OUTPATIENT)
Dept: FAMILY MEDICINE CLINIC | Facility: CLINIC | Age: 48
End: 2023-04-21
Payer: COMMERCIAL

## 2023-04-21 NOTE — TELEPHONE ENCOUNTER
Hub/front can relay message     Please let pt know that he has lab open that are due. Please let the pt know he can stop by any  lab to get this done

## 2023-04-21 NOTE — TELEPHONE ENCOUNTER
"Provider: REHAN BAZZI DO     Caller: LEANDRO GAITANTON    Phone Number: [phone]    Reason for Call: PATIENT RETURNING CALL TO THE OFFICE. HUB HAS RELAYED,   \"HUB TO READ\" MESSAGE TO PATIENT. PATIENT VERBALIZED UNDERSTANDING .             "

## 2023-05-02 ENCOUNTER — TELEPHONE (OUTPATIENT)
Dept: FAMILY MEDICINE CLINIC | Facility: CLINIC | Age: 48
End: 2023-05-02

## 2023-05-02 NOTE — TELEPHONE ENCOUNTER
"Caller: Louie Levy \"Yaya\"    Relationship: Self    Best call back number:   [phone]    HE WAS IN THE HOSPITAL ON 4/1 AND WAS TOLD HE CANNOT DRIVE. HE WOULD LIKE TO GET THE ASSESSMENT DONE TO CLEAR HIM TO BE ABLE TO DRIVE.    CAN REHAN DO THE TEST OR DOES HE NEED A REFERRAL?    PLEASE CALL AND ADVISE  "

## 2023-05-03 DIAGNOSIS — H53.9 VISION CHANGES: Primary | ICD-10-CM

## 2023-05-03 NOTE — TELEPHONE ENCOUNTER
I spoke with the pt and advised him of the below message. He would like you to place a referral for him please.

## 2023-08-08 RX ORDER — TRAZODONE HYDROCHLORIDE 50 MG/1
50 TABLET ORAL NIGHTLY
Qty: 90 TABLET | Refills: 0 | Status: SHIPPED | OUTPATIENT
Start: 2023-08-08

## 2023-08-17 ENCOUNTER — OFFICE VISIT (OUTPATIENT)
Dept: FAMILY MEDICINE CLINIC | Facility: CLINIC | Age: 48
End: 2023-08-17
Payer: COMMERCIAL

## 2023-08-17 VITALS
BODY MASS INDEX: 32.91 KG/M2 | HEART RATE: 82 BPM | OXYGEN SATURATION: 97 % | RESPIRATION RATE: 20 BRPM | WEIGHT: 222.2 LBS | HEIGHT: 69 IN | SYSTOLIC BLOOD PRESSURE: 136 MMHG | DIASTOLIC BLOOD PRESSURE: 90 MMHG

## 2023-08-17 DIAGNOSIS — H53.2 DIPLOPIA: ICD-10-CM

## 2023-08-17 DIAGNOSIS — R90.89 ABNORMAL BRAIN MRI: Primary | ICD-10-CM

## 2023-08-17 PROBLEM — Z09 HOSPITAL DISCHARGE FOLLOW-UP: Status: RESOLVED | Noted: 2023-04-07 | Resolved: 2023-08-17

## 2023-08-17 PROBLEM — J32.9 SINUSITIS: Status: RESOLVED | Noted: 2022-04-06 | Resolved: 2023-08-17

## 2023-08-17 RX ORDER — MIRTAZAPINE 15 MG/1
15 TABLET, ORALLY DISINTEGRATING ORAL NIGHTLY
COMMUNITY

## 2023-08-17 RX ORDER — GABAPENTIN 100 MG/1
100 CAPSULE ORAL 3 TIMES DAILY
COMMUNITY
Start: 2023-08-10

## 2023-08-17 NOTE — PROGRESS NOTES
"Chief Complaint  No chief complaint on file.    History of Present Illness        The patient is here to have paperwork signed off that he can participate in a  rehab program that will help him to learn to drive again.     His imbalance has resolved. He says his vision has remained \"the same\". He notices double vision mostly in the afternoon after a long day. He says when he wears a patch on the right eye he has no double vision in the left eye and has 20/20 vision corrected in the left eye. He has 20/20 corrected documented by the ophthalmologist.     He has not been drinking any alcohol. His last drink was two months ago a glass of wine.   No drug use.   He follows with behavioral health for adhd and history of alcohol use. [facility]. He goes every 90 days for an in person visit. He does not have any alcohol cravings at this time.     The following portions of the patient's history were reviewed and updated as appropriate: allergies, current medications, past family history, past medical history, past social history, past surgical history, and problem list.    OBJECTIVE:  /90   Pulse 82   Resp 20   Ht 175.3 cm (69.02\")   Wt 101 kg (222 lb 3.2 oz)   SpO2 97%   BMI 32.80 kg/mý       Physical Exam  Constitutional:       General: He is not in acute distress.     Appearance: Normal appearance.   HENT:      Head: Normocephalic and atraumatic.   Eyes:      Extraocular Movements: Extraocular movements intact.   Cardiovascular:      Rate and Rhythm: Normal rate and regular rhythm.      Heart sounds: No murmur heard.  Pulmonary:      Effort: Pulmonary effort is normal. No respiratory distress.      Breath sounds: Normal breath sounds. No stridor. No wheezing, rhonchi or rales.   Skin:     Findings: No rash.   Neurological:      General: No focal deficit present.      Mental Status: He is alert.   Psychiatric:         Mood and Affect: Mood normal.                  Assessment and Plan   Diagnoses " and all orders for this visit:    1. Abnormal brain MRI (Primary)    2. Diplopia      Discussed risks and benefits of learning to drive again. I spoke with Dr. Hernandez and he is okay with the patient participating in the driving rehab program.   This form will not allow him to drive again but allows him to work with a certified rehab driving specialist who will ultimately make the decision.     I have reviewed the neurology and ophthalmology note. Reviewed last cbc cmp urinalysis.     Return in about 6 months (around 2/17/2024) for Annual.       Trina Feldman D.O.  [facility] [facility]

## 2023-09-17 ENCOUNTER — APPOINTMENT (OUTPATIENT)
Dept: GENERAL RADIOLOGY | Facility: HOSPITAL | Age: 48
End: 2023-09-17
Payer: COMMERCIAL

## 2023-09-17 ENCOUNTER — HOSPITAL ENCOUNTER (EMERGENCY)
Facility: HOSPITAL | Age: 48
Discharge: HOME | End: 2023-09-17
Attending: STUDENT IN AN ORGANIZED HEALTH CARE EDUCATION/TRAINING PROGRAM | Admitting: STUDENT IN AN ORGANIZED HEALTH CARE EDUCATION/TRAINING PROGRAM
Payer: COMMERCIAL

## 2023-09-17 VITALS
TEMPERATURE: 97.3 F | SYSTOLIC BLOOD PRESSURE: 145 MMHG | WEIGHT: 215 LBS | RESPIRATION RATE: 16 BRPM | DIASTOLIC BLOOD PRESSURE: 96 MMHG | BODY MASS INDEX: 30.78 KG/M2 | HEART RATE: 82 BPM | OXYGEN SATURATION: 98 % | HEIGHT: 70 IN

## 2023-09-17 DIAGNOSIS — S29.9XXA TRAUMATIC INJURY OF RIB: ICD-10-CM
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
## 2023-09-17 DIAGNOSIS — G47.9 SLEEPING DIFFICULTY: ICD-10-CM

## 2023-09-17 DIAGNOSIS — T14.8XXA MUSCLE STRAIN: ICD-10-CM

## 2023-09-17 DIAGNOSIS — R07.81 RIB PAIN ON LEFT SIDE: Primary | ICD-10-CM

## 2023-09-17 LAB
QT INTERVAL: 372 MS
QTC INTERVAL: 429 MS

## 2023-09-17 PROCEDURE — 71101 X-RAY EXAM UNILAT RIBS/CHEST: CPT

## 2023-09-17 PROCEDURE — 93005 ELECTROCARDIOGRAM TRACING: CPT | Performed by: STUDENT IN AN ORGANIZED HEALTH CARE EDUCATION/TRAINING PROGRAM

## 2023-09-17 PROCEDURE — 99283 EMERGENCY DEPT VISIT LOW MDM: CPT

## 2023-09-17 RX ORDER — IBUPROFEN 600 MG/1
600 TABLET ORAL EVERY 6 HOURS PRN
Qty: 20 TABLET | Refills: 0 | Status: SHIPPED | OUTPATIENT
Start: 2023-09-17

## 2023-09-17 RX ORDER — LIDOCAINE 4 G/G
1 PATCH TOPICAL
Status: DISCONTINUED | OUTPATIENT
Start: 2023-09-17 | End: 2023-09-17 | Stop reason: HOSPADM

## 2023-09-17 RX ORDER — IBUPROFEN 800 MG/1
800 TABLET ORAL ONCE
Status: COMPLETED | OUTPATIENT
Start: 2023-09-17 | End: 2023-09-17

## 2023-09-17 RX ORDER — OXYCODONE HYDROCHLORIDE 5 MG/1
5 TABLET ORAL ONCE
Status: DISCONTINUED | OUTPATIENT
Start: 2023-09-17 | End: 2023-09-17 | Stop reason: HOSPADM

## 2023-09-17 RX ORDER — METHOCARBAMOL 750 MG/1
750 TABLET, FILM COATED ORAL ONCE
Status: COMPLETED | OUTPATIENT
Start: 2023-09-17 | End: 2023-09-17

## 2023-09-17 RX ORDER — METHOCARBAMOL 750 MG/1
750 TABLET, FILM COATED ORAL 3 TIMES DAILY
Qty: 9 TABLET | Refills: 0 | Status: SHIPPED | OUTPATIENT
Start: 2023-09-17 | End: 2023-09-21

## 2023-09-17 RX ORDER — LIDOCAINE 50 MG/G
1 PATCH TOPICAL EVERY 24 HOURS
Qty: 6 PATCH | Refills: 0 | Status: SHIPPED | OUTPATIENT
Start: 2023-09-17

## 2023-09-17 RX ORDER — OXYCODONE HYDROCHLORIDE 5 MG/1
5 TABLET ORAL EVERY 4 HOURS PRN
Qty: 4 TABLET | Refills: 0 | Status: SHIPPED | OUTPATIENT
Start: 2023-09-17

## 2023-09-17 RX ORDER — ACETAMINOPHEN 500 MG
1000 TABLET ORAL ONCE
Status: COMPLETED | OUTPATIENT
Start: 2023-09-17 | End: 2023-09-17

## 2023-09-17 RX ADMIN — LIDOCAINE 1 PATCH: 560 PATCH PERCUTANEOUS; TOPICAL; TRANSDERMAL at 10:40

## 2023-09-17 RX ADMIN — ACETAMINOPHEN 1000 MG: 500 TABLET ORAL at 10:32

## 2023-09-17 RX ADMIN — IBUPROFEN 800 MG: 800 TABLET, FILM COATED ORAL at 10:32

## 2023-09-17 RX ADMIN — METHOCARBAMOL 750 MG: 750 TABLET ORAL at 10:32

## 2023-09-17 NOTE — ED PROVIDER NOTES
EMERGENCY DEPARTMENT ENCOUNTER    Pt Name: Louie Levy  MRN: [id number]  Pt :   1975  Room Number:    Date of encounter:  2023  PCP: Trina Feldman DO  ED Provider: Bernardo Beltrán MD    Historian: Patient, spouse      HPI:  Chief Complaint: Rib pain after fall        Context: Louie Levy is a 48-year-old man who presents the emergency department for 3 weeks of left low anterior chest pain after trauma.  He was at work fell against a metal beam, hitting his left low anterior ribs.  He has had pain since then.  He initially had significant bruising over the area which has resolved but his pain is persisted is making it difficult for him to sleep.  He also has pain with taking a very deep breath.  No appreciated cough or fevers.  He had a second fall about a week ago where he did not actually hit that area but found it very jarring and painful in that area.  Denies pain elsewhere.  No other complaints at this time.      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    Hyperlipidemia 2023    Mood disorder     Seasonal allergies     Tinnitus of both ears 2020         PAST SURGICAL HISTORY  Past Surgical History:   Procedure Laterality Date    CLAVICLE SURGERY Left 2015    NASAL SEPTAL RECONSTRUCTION      WISDOM TOOTH EXTRACTION           FAMILY HISTORY  Family History   Problem Relation Age of Onset    Heart failure Mother     Heart disease Father     Skin cancer Father     Heart failure Father     No Known Problems Sister     Heart disease Maternal Grandmother     Heart failure Paternal Grandfather     No Known Problems Sister     No Known Problems Sister          SOCIAL HISTORY  Social History     Socioeconomic History    Marital status:      Spouse name: Annetta    Eduard of  children: 1    Years of education: College    Highest education level: Bachelor's degree (e.g., BA, AB, BS)   Tobacco Use    Smoking status: Never    Smokeless tobacco: Never   Vaping Use    Vaping Use: Never used   Substance and Sexual Activity    Alcohol use: Not Currently     Alcohol/week: 2.0 standard drinks     Types: 1 Cans of beer, 1 Shots of liquor per week    Drug use: Never    Sexual activity: Yes     Partners: Female         ALLERGIES  Patient has no known allergies.        REVIEW OF SYSTEMS  Review of Systems       All systems reviewed and negative except for those discussed in HPI.       PHYSICAL EXAM    I have reviewed the triage vital signs and nursing notes.    ED Triage Vitals [09/17/23 0935]   Temp Heart Rate Resp BP SpO2   97.3 °F (36.3 °C) 86 16 (!) 145/104 100 %      Temp src Heart Rate Source Patient Position BP Location FiO2 (%)   -- -- -- -- --       Physical Exam  GENERAL:   Appears in no acute distress.   HENT: Nares patent.  EYES: No scleral icterus.  CV: Regular rhythm, regular rate.  RESPIRATORY: Normal effort.  No audible wheezes, rales or rhonchi.  ABDOMEN: Soft, nontender  MUSCULOSKELETAL: No deformities.  Tenderness over left low anterior ribs to palpation without appreciated deformity or bruising though there was reported to be bruising initially after the injury.  NEURO: Alert, moves all extremities, follows commands.  SKIN: Warm, dry, no rash visualized.      LAB RESULTS  Recent Results (from the past 24 hour(s))   ECG 12 Lead Chest Pain    Collection Time: 09/17/23 10:36 AM   Result Value Ref Range    QT Interval 372 ms    QTC Interval 429 ms       If labs were ordered, I independently reviewed the results and considered them in treating the patient.        RADIOLOGY  XR Ribs Left With PA Chest    Result Date: 9/17/2023  XR RIBS LEFT W PA CHEST Date of Exam: 9/17/2023 9:59 AM EDT Indication: Anterior left chest pain, previous trauma Comparison: 4/1/2023. Findings: There is no  acute or healing left rib fracture. The visualized lungs and pleural spaces are clear. There is old surgical hardware in the left clavicle with a healed fracture. The heart size is normal. The pulmonary vascular markings are normal.     Impression: No acute or healing left rib fracture. Electronically Signed: Elvin Jackson MD  9/17/2023 11:04 AM EDT  Workstation ID: [id number]     I ordered and independently reviewed the above noted radiographic studies.      I viewed images of x-ray of the chest and ribs which shows old clavicle fracture but no acute rib injuries, pneumonia, pneumothorax, or any other acute pathology that I can appreciate    See radiologist's dictation for official interpretation.        PROCEDURES    Procedures    ECG 12 Lead Chest Pain   Preliminary Result   Test Reason : RIB PAIN   Blood Pressure :   */*   mmHG   Vent. Rate :  80 BPM     Atrial Rate :  80 BPM      P-R Int : 152 ms          QRS Dur :  82 ms       QT Int : 372 ms       P-R-T Axes :  12   8  17 degrees      QTc Int : 429 ms      Normal sinus rhythm   Normal ECG   When compared with ECG of 01-APR-2023 12:12,   No significant change was found      Referred By: EDMD           Confirmed By:           MEDICATIONS GIVEN IN ER    Medications   oxyCODONE (ROXICODONE) immediate release tablet 5 mg (0 mg Oral Hold 9/17/23 1035)   Lidocaine 4 % 1 patch (1 patch Transdermal Medication Applied 9/17/23 1040)   acetaminophen (TYLENOL) tablet 1,000 mg (1,000 mg Oral Given 9/17/23 1032)   ibuprofen (ADVIL,MOTRIN) tablet 800 mg (800 mg Oral Given 9/17/23 1032)   methocarbamol (ROBAXIN) tablet 750 mg (750 mg Oral Given 9/17/23 1032)         MEDICAL DECISION MAKING, PROGRESS, and CONSULTS    All labs have been independently reviewed by me.  All radiology studies have been reviewed by me and the radiologist dictating the report.  All EKG's have been independently viewed and interpreted by me/my attending physician.      Discussion below represents my  analysis of pertinent findings related to patient's condition, differential diagnosis, treatment plan and final disposition.      Differential diagnosis:    Rib fracture, musculoskeletal injury, secondary pneumonia, acute respiratory failure, myocardial infarction, intercostal injury      Additional sources:    - Discussed/ obtained information from independent historians: Spouse    - External (non-ED) record review: Notes with Dr. Homero Melendez at [facility] neuro-ophthalmology for treatment of internuclear ophthalmoplegia, traumatic brain injury, diplopia.  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  He was at work fell against a metal beam, hitting his left low anterior ribs.  He has had pain since then.  He initially had significant bruising over the area which has resolved but his pain is persisted is making it difficult for him to sleep.  He also has pain with taking a very deep breath.  No appreciated cough or fevers.  He had a second fall about a week ago where he did not actually hit that area but found it very jarring and painful in that area.  Denies pain elsewhere.  No other complaints at this time. [CC]   0954 He arrived awake and alert very mildly hypertensive at 145/104 otherwise vitals are within normal limits he is satting 100% on room air with normal respiratory rate.  He does have  tenderness over the left low anterior chest wall concern for rib fracture or even possible secondary pneumonia obtaining dedicated rib and chest x-rays.  Treating pain with ibuprofen acetaminophen and a Lidoderm patch 5 mg of oxycodone and methocarbamol.  Will reevaluate pending x-rays. [CC]   1109 X-ray negative for any acute or healing rib fractures.  No evidence of pneumonia. [CC]   1129 Discussed the findings with him we will try brief course of pain medication and muscle relaxers which I think should help with his symptoms.  Counseled to avoid heavy lifting or straining but maintain light activity.  Discharged in stable condition. [CC]      ED Course User Index  [CC] Bernardo Beltrán MD              Shared Decision Making:  After my consideration of clinical presentation and any laboratory/radiology studies obtained, I discussed the findings with the patient/patient representative who is in agreement with the treatment plan and the final disposition.   Risks and benefits of discharge and/or observation/admission were discussed.       AS OF 11:32 EDT VITALS:    BP - 145/96  HR - 82  TEMP - 97.3 °F (36.3 °C)  O2 SATS - 98%      ALESSANDRA reviewed by Bernardo Beltrán MD           DIAGNOSIS  Final diagnoses:   Rib pain on left side   Fall, initial encounter   Sleeping difficulty   Traumatic injury of rib   Muscle strain         DISPOSITION  DISCHARGE    Patient discharged in stable condition.    Reviewed implications of results, diagnosis, meds, responsibility to follow up, warning signs and symptoms of possible worsening, potential complications and reasons to return to ER.    Patient/Family voiced understanding of above instructions.    Discussed plan for discharge, as there is no emergent indication for admission.  Pt/family is agreeable and understands need for follow up and possible repeat testing.  Pt/family is aware that discharge does not mean that nothing is wrong but that it indicates no  emergency is currently present that requires admission and they must continue care with follow-up as given below or with a physician of their choice.     FOLLOW-UP  Trina Feldman DO  [address] [facility] [address]  [place] [address]  [phone]               Medication List        New Prescriptions      ibuprofen 600 MG tablet  Commonly known as: ADVIL,MOTRIN  Take 1 tablet by mouth Every 6 (Six) Hours As Needed for Mild Pain.     lidocaine 5 %  Commonly known as: LIDODERM  Place 1 patch on the skin as directed by provider Daily. Remove & Discard patch within 12 hours or as directed by MD     methocarbamol 750 MG tablet  Commonly known as: ROBAXIN  Take 1 tablet by mouth 3 (Three) Times a Day for 3 days.     oxyCODONE 5 MG immediate release tablet  Commonly known as: Roxicodone  Take 1 tablet by mouth Every 4 (Four) Hours As Needed for Severe Pain.               Where to Get Your Medications        These medications were sent to [id number] - [facility] [address] AT [facility] - [phone] [facility] [phone] [place] [address] [address] [facility] [address]      Phone: [phone]   ibuprofen 600 MG tablet  lidocaine 5 %  methocarbamol 750 MG tablet  oxyCODONE 5 MG immediate release tablet             Please note that portions of this document were completed with voice recognition software.        Bernardo Beltrán MD  09/17/23 4930

## 2023-09-17 NOTE — DISCHARGE INSTRUCTIONS
Use the provided pain medication to help with symptoms particularly at night.  Avoid heavy lifting or straining but maintain light activity.  While today's work-up was reassuring if your symptoms change or worsen please return to the ED or seek other medical care.

## 2023-09-21 ENCOUNTER — OFFICE VISIT (OUTPATIENT)
Dept: NEUROLOGY | Facility: CLINIC | Age: 48
End: 2023-09-21
Payer: COMMERCIAL

## 2023-09-21 VITALS
HEART RATE: 100 BPM | OXYGEN SATURATION: 99 % | SYSTOLIC BLOOD PRESSURE: 142 MMHG | WEIGHT: 228 LBS | TEMPERATURE: 98.2 F | BODY MASS INDEX: 32.64 KG/M2 | DIASTOLIC BLOOD PRESSURE: 92 MMHG | HEIGHT: 70 IN

## 2023-09-21 DIAGNOSIS — Z86.73 HISTORY OF STROKE: Primary | ICD-10-CM

## 2023-09-21 NOTE — PROGRESS NOTES
New Patient Office Visit      Encounter Date: 2023   Patient Name: Louie Levy  : 1975   MRN: [id number]   PCP: Trina Feldman DO    Referring Provider: No ref. provider found     Chief Complaint:    Chief Complaint   Patient presents with    Follow-up       History of Present Illness: Louie Leyv is a 48 y.o. male who is here today to establish care.  He has known medical diagnoses of prior TBI with SDH, ADHD, history of ETOH and tobacco use. He presented to [facility] on 23 with visual blurring, dysconjugate eyes, nausea and lightheadedness. He also reported developing worsening balance, leaning to the left and hiccups. His initial work up included CT head which revealed no acute abnormality but there was a chronic right frontal infarct. CTA head and neck and CT perfusion negative for large vessel occlusion or flow limiting stenosis. MRI was ultimately obtained 23 which revealed small acute or subacute infarct within right posterior yulissa. Some petechial staining within infarct, but no large hemorrhagic transformation. Surrounding gliosis within more peripheral right yulissa. Repeat MRI with contrast was done on 23 which revealed recently noted focus of diffusion signal abnormality in the right dorsal yulissa demonstrates a tiny area of otherwise benign-appearing vascular enhancement, favored to reflect a small developmental venous anomaly. There is a small focus of   susceptibility artifact present on recent noncontrast comparison and the finding in the dorsal yulissa is favored to reflect a small cavernous venous malformation/cavernoma. There was some debate however whether this represented an infarct or cavernous malformation with DVA and edema or ischemia stroke with some hemorrhagic conversion. Ultimately patient was discharged on ASA 81 mg, lipitor 80 mg and recommended to follow up with Dr. Valdez with neurosurgery in 1 month and Dr. Hernandez in stroke clinic. Patient sought a second  opinion at the [facility] where a hypercoaguable panel was obtained but negative for thrombophilia. He did have repeat imaging done which revealed similar findings. Neurologist Dr. Brooke Suggs had recommended a repeat MRI brain with and without in 6-8 months and to return for follow up with her at that time. She agreed with continuing ASA 81 mg and statin. Patient has discontinued his statin and does not wish to take this. He also saw Dr. Agustin with neurosurgery at [facility] which he agreed with Dr. Jenkins plan. He diagnosed the patient with intracranial process of unknown etiology and recommended close clinical and radiologic follow up to determine the etiopathophysiology of this lesion.     I have recommended to the patient to repeat MRI brain with and without as this is technically been 6-8 months since the initial event and keep follow up with Dr. Valdez if he wishes to be followed within the [facility] network. Or he can call and make future follow up with [facility]. I did recommend that he choose one or the other so that services aren't duplicated and double charged. He would like to establish with [facility]. Of note, he does continue to have right eye RAJWINDER.     Stroke Risk Factors: hyperlipidemia, hypertension, and smoking      Subjective      Review of Systems:   Review of Systems   Constitutional:  Negative for activity change, appetite change, chills, diaphoresis, fatigue, fever, unexpected weight gain and unexpected weight loss.   Eyes:  Positive for visual disturbance. Negative for photophobia.   Respiratory:  Negative for cough and shortness of breath.    Cardiovascular:  Negative for chest pain and palpitations.   Gastrointestinal:  Negative for nausea and vomiting.   Musculoskeletal:  Negative for gait problem.   Neurological:  Negative for dizziness, tremors, seizures, syncope, facial asymmetry, speech difficulty, weakness, light-headedness, numbness, headache, memory problem and confusion.     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    Hyperlipidemia 04/03/2023    Mood disorder     Seasonal allergies     Stroke     Tinnitus of both ears 09/08/2020       Past Surgical History:   Past Surgical History:   Procedure Laterality Date    CLAVICLE SURGERY Left 2015    NASAL SEPTAL RECONSTRUCTION  1992    WISDOM TOOTH EXTRACTION  1990       Family History:   Family History   Problem Relation Age of Onset    Heart failure Mother     Heart disease Father     Skin cancer Father     Heart failure Father     No Known Problems Sister     Heart disease Maternal Grandmother     Heart failure Paternal Grandfather     No Known Problems Sister     No Known Problems Sister        Social History:   Social History     Socioeconomic History    Marital status:      Spouse name: Annetta    Number of children: 1    Years of education: College    Highest education level: Bachelor's degree (e.g., BA, AB, BS)   Tobacco Use    Smoking status: Never     Passive exposure: Never    Smokeless tobacco: Never   Vaping Use    Vaping Use: Never used   Substance and Sexual Activity    Alcohol use: Not Currently     Alcohol/week: 2.0 standard drinks     Types: 1 Cans of beer, 1 Shots of liquor per week    Drug use: Never    Sexual activity: Yes     Partners: Female       Medications:     Current Outpatient Medications:     aspirin 81 MG EC tablet, Take 1 tablet by mouth Daily., Disp: 1 tablet, Rfl: 0    Dextromethorphan-buPROPion ER (AUVELITY)  MG tablet controlled-release, Take 1 tablet by mouth 2 (Two) Times a Day., Disp: , Rfl:     gabapentin (NEURONTIN) 100 MG capsule, Take 1 capsule by mouth 3 (Three) Times a Day., Disp: , Rfl:     ibuprofen (ADVIL,MOTRIN) 600 MG tablet, Take 1 tablet by mouth Every 6 (Six) Hours As Needed for Mild Pain., Disp:  "20 tablet, Rfl: 0    lidocaine (LIDODERM) 5 %, Place 1 patch on the skin as directed by provider Daily. Remove & Discard patch within 12 hours or as directed by MD, Disp: 6 patch, Rfl: 0    methocarbamol (ROBAXIN) 750 MG tablet, Take 1 tablet by mouth 3 (Three) Times a Day for 3 days., Disp: 9 tablet, Rfl: 0    mirtazapine (REMERON SOL-TAB) 15 MG disintegrating tablet, Place 1 tablet on the tongue Every Night., Disp: , Rfl:     oxyCODONE (Roxicodone) 5 MG immediate release tablet, Take 1 tablet by mouth Every 4 (Four) Hours As Needed for Severe Pain., Disp: 4 tablet, Rfl: 0    atorvastatin (LIPITOR) 80 MG tablet, Take 1 tablet by mouth Every Night. (Patient not taking: Reported on 9/21/2023), Disp: 90 tablet, Rfl: 2    Allergies:   No Known Allergies    Objective     Physical Exam:  Vital Signs:   Vitals:    09/21/23 0842   BP: 142/92   Pulse: 100   Temp: 98.2 °F (36.8 °C)   SpO2: 99%   Weight: 103 kg (228 lb)   Height: 177.8 cm (70\")     Body mass index is 32.71 kg/m².     Physical Exam  Vitals and nursing note reviewed.   Constitutional:       General: He is not in acute distress.     Appearance: Normal appearance. He is normal weight. He is not ill-appearing.   HENT:      Head: Normocephalic and atraumatic.      Nose: Nose normal.      Mouth/Throat:      Mouth: Mucous membranes are moist.   Eyes:      Extraocular Movements: Extraocular movements intact.      Pupils: Pupils are equal, round, and reactive to light.   Cardiovascular:      Rate and Rhythm: Normal rate and regular rhythm.      Pulses: Normal pulses.   Pulmonary:      Effort: Pulmonary effort is normal. No respiratory distress.   Skin:     General: Skin is warm and dry.   Neurological:      Mental Status: He is alert and oriented to person, place, and time.      Cranial Nerves: Cranial nerve deficit present.      Comments: Right eye RAJWINDER with poor adduction    Psychiatric:         Mood and Affect: Mood normal.         Behavior: Behavior normal. "       Modified Gypsy Score: 1        0  No Symptoms    1 No significant disability. Able to carry out all usual activities, despite some symptoms.    2 Slight disability. Able to look after own affairs without assistance, but unable to carry out all previous activities.    3 Moderate disability. Requires some help, but able to walk unassisted.    4 Moderately severe disability. Unable to attend to own bodily needs without assistance, and unable to walk unassisted.    5 Severe disability. Requires constant nursing care and attention, bedridden, incontinent.    6 Dead       PHQ-9 Depression Screening  Little interest or pleasure in doing things? 0-->not at all   Feeling down, depressed, or hopeless? 1-->several days   Trouble falling or staying asleep, or sleeping too much?     Feeling tired or having little energy?     Poor appetite or overeating?     Feeling bad about yourself - or that you are a failure or have let yourself or your family down?     Trouble concentrating on things, such as reading the newspaper or watching television?     Moving or speaking so slowly that other people could have noticed? Or the opposite - being so fidgety or restless that you have been moving around a lot more than usual?     Thoughts that you would be better off dead, or of hurting yourself in some way?     PHQ-9 Total Score 1   If you checked off any problems, how difficult have these problems made it for you to do your work, take care of things at home, or get along with other people?           Imaging Reviewed:   CT Angiogram Neck     Result Date: 4/1/2023  Impression: CT perfusion demonstrates expected deficits relating to area of volume loss in the right inferior frontal lobe. There is otherwise no evidence of acute core infarct or territorial ischemic tissue at risk. Essentially normal CT angiogram of the head and neck without evidence of flow-limiting stenosis, large vessel occlusion or aneurysmal dilatation. Electronically  Signed: Yo Gil  4/1/2023 1:33 PM EDT  Workstation ID: [id number]     MRI Brain Without Contrast     Result Date: 4/1/2023  Impression: 1.Small acute or subacute infarct within right posterior yulissa. Some petechial staining within infarct, but no large hemorrhagic transformation. 2.Surrounding gliosis within more peripheral right yulissa. 3.Encephalomalacia within right frontal lobe. 4.Findings suggestive of minimal chronic small vessel ischemic disease. Electronically Signed: Audie Manuel  4/1/2023 9:53 PM EDT  Workstation ID: [id number]     MRI Brain With & Without Contrast     Result Date: 4/3/2023  Impression: The recently noted focus of diffusion signal abnormality in the right dorsal yulissa demonstrates a tiny area of otherwise benign-appearing vascular enhancement, favored to reflect a small developmental venous anomaly. 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 Localized infarct remains an additional consideration. Remainder of exam is unchanged and there is no specific evidence of demyelinating process. Electronically Signed: Yo Gil  4/3/2023 5:57 AM EDT  Workstation ID: [id number]     XR Chest 1 View     Result Date: 4/1/2023  Impression: 1.No acute pulmonary process. Electronically Signed: Esau Cullen  4/1/2023 1:37 PM EDT  Workstation ID: [id number]     CT Angiogram Head w AI Analysis of LVO     Result Date: 4/1/2023  Impression: CT perfusion demonstrates expected deficits relating to area of volume loss in the right inferior frontal lobe. There is otherwise no evidence of acute core infarct or territorial ischemic tissue at risk. Essentially normal CT angiogram of the head and neck without evidence of flow-limiting stenosis, large vessel occlusion or aneurysmal dilatation. Electronically Signed: Yo Gil  4/1/2023 1:33 PM EDT  Workstation ID:  [id number]     CT CEREBRAL PERFUSION WITH & WITHOUT CONTRAST     Result Date: 4/1/2023  Impression: CT perfusion demonstrates expected deficits relating to area of volume loss in the right inferior frontal lobe. There is otherwise no evidence of acute core infarct or territorial ischemic tissue at risk. Essentially normal CT angiogram of the head and neck without evidence of flow-limiting stenosis, large vessel occlusion or aneurysmal dilatation. Electronically Signed: Yo Gil  4/1/2023 1:33 PM EDT  Workstation ID: [id number]     Results for orders placed during the hospital encounter of 04/01/23     Adult Transthoracic Echo Complete W/ Cont if Necessary Per Protocol (With Agitated Saline)     Interpretation Summary    Left ventricular ejection fraction appears to be 61 - 65%.    Saline test results are negative.    Laboratory Results:   Glucose 97 mg/dL           BUN 13 mg/dL         Creatinine 0.82 mg/dL         Sodium 140 mmol/L         Potassium 4.4 mmol/L         Comment: Slight hemolysis detected by analyzer. Results may be affected.          Chloride 106 mmol/L         CO2 22.0 mmol/L         Calcium 8.7 mg/dL         Total Protein 6.2 g/dL         Albumin 4.0 g/dL         ALT (SGPT) 15 U/L         AST (SGOT) 17 U/L         Alkaline Phosphatase 43 U/L         Total Bilirubin 0.2 mg/dL         Globulin 2.2 gm/dL         Comment: Calculated Result          A/G Ratio 1.8 g/dL         BUN/Creatinine Ratio 15.9       Anion Gap 12.0 mmol/L         eGFR 109.0 mL/min/1.73       Narrative:       GFR Normal >60  Chronic Kidney Disease <60  Kidney Failure <15        CBC (No Diff) [[id number]]  (Normal) Collected: 04/03/23 0619     Specimen: Blood Updated: 04/03/23 0646       WBC 7.04 10*3/mm3         RBC 4.83 10*6/mm3         Hemoglobin 15.0 g/dL         Hematocrit 43.9 %         MCV 90.9 fL         MCH 31.1 pg         MCHC 34.2 g/dL         RDW 12.8 %         RDW-SD 42.3 fl         MPV 9.6 fL         Platelets 245  10*3/mm3                Assessment / Plan      Assessment/Plan:     Intracranial process of unknown etiology. Acute stroke in yulissa vs cavernous malformation   -I have reviewed documentation from [facility] and disc has been sent for upload of additional imaging  -follow up is recommend with Dr. Valdez with neurosurgery after getting repeat MRI brain w/wo  -continue ASA 81 mg   -recommend lipitor but patient did stop taking this. He is open to resuming following a lipid panel recheck if warranted. I feel this is reasonable  -normal blood pressure goals, <130/80   -patient has been doing neuro ophthalmology therapy and has been cleared to drive with eye patching  -will recheck lipid panel and A1c as we do not have recent values to review. Will call and discuss with patient when available     Discussed the importance of medication compliance and lifestyle modifications (adequate blood pressure control, adequate control of hyperlipidemia, adequate glycemic control, increase physical activity, and healthy diet) to help reduce the risk of future cerebrovascular events.  Also discussed the signs symptoms that would warrant the patient return back to the emergency department including unilateral weakness, unilateral numbness, visual disturbances, loss of balance, speech difficulties, and/or a sudden severe headache.      Follow Up:   Return in about 3 months (around 12/21/2023).    SAKINA Montoya  [facility]

## 2023-10-18 ENCOUNTER — TELEPHONE (OUTPATIENT)
Dept: NEUROLOGY | Facility: CLINIC | Age: 48
End: 2023-10-18
Payer: COMMERCIAL

## 2023-10-18 NOTE — TELEPHONE ENCOUNTER
RUI asking patient to call [phone] about referral for MRI.         HUB................... If patient calls back please transfer to the office.  Thank you.

## 2024-09-30 ENCOUNTER — HOSPITAL ENCOUNTER (OUTPATIENT)
Facility: HOSPITAL | Age: 49
Discharge: HOME | End: 2024-10-01
Attending: EMERGENCY MEDICINE | Admitting: SURGERY
Payer: COMMERCIAL

## 2024-09-30 DIAGNOSIS — R10.31 RIGHT LOWER QUADRANT ABDOMINAL PAIN: ICD-10-CM

## 2024-09-30 DIAGNOSIS — Z86.73 HISTORY OF STROKE: ICD-10-CM

## 2024-09-30 DIAGNOSIS — Z87.820 HISTORY OF TRAUMATIC BRAIN INJURY: ICD-10-CM

## 2024-09-30 DIAGNOSIS — Z86.39 HISTORY OF HYPERLIPIDEMIA: ICD-10-CM

## 2024-09-30 DIAGNOSIS — K35.80 ACUTE APPENDICITIS, UNSPECIFIED ACUTE APPENDICITIS TYPE: ICD-10-CM

## 2024-09-30 DIAGNOSIS — R11.2 NAUSEA AND VOMITING, UNSPECIFIED VOMITING TYPE: ICD-10-CM

## 2024-09-30 DIAGNOSIS — K37 APPENDICITIS: Primary | ICD-10-CM

## 2024-09-30 PROCEDURE — 81003 URINALYSIS AUTO W/O SCOPE: CPT | Performed by: EMERGENCY MEDICINE

## 2024-09-30 PROCEDURE — 85025 COMPLETE CBC W/AUTO DIFF WBC: CPT | Performed by: EMERGENCY MEDICINE

## 2024-09-30 PROCEDURE — 80053 COMPREHEN METABOLIC PANEL: CPT | Performed by: EMERGENCY MEDICINE

## 2024-09-30 PROCEDURE — 99285 EMERGENCY DEPT VISIT HI MDM: CPT

## 2024-09-30 PROCEDURE — 83605 ASSAY OF LACTIC ACID: CPT | Performed by: PHYSICIAN ASSISTANT

## 2024-09-30 PROCEDURE — 83690 ASSAY OF LIPASE: CPT | Performed by: EMERGENCY MEDICINE

## 2024-09-30 RX ORDER — SODIUM CHLORIDE 9 MG/ML
10 INJECTION, SOLUTION INTRAMUSCULAR; INTRAVENOUS; SUBCUTANEOUS AS NEEDED
Status: DISCONTINUED | OUTPATIENT
Start: 2024-09-30 | End: 2024-10-01 | Stop reason: HOSPADM

## 2024-10-01 ENCOUNTER — ANESTHESIA (OUTPATIENT)
Dept: PERIOP | Facility: HOSPITAL | Age: 49
End: 2024-10-01
Payer: COMMERCIAL

## 2024-10-01 ENCOUNTER — READMISSION MANAGEMENT (OUTPATIENT)
Dept: CALL CENTER | Facility: HOSPITAL | Age: 49
End: 2024-10-01
Payer: COMMERCIAL

## 2024-10-01 ENCOUNTER — APPOINTMENT (OUTPATIENT)
Dept: CT IMAGING | Facility: HOSPITAL | Age: 49
End: 2024-10-01
Payer: COMMERCIAL

## 2024-10-01 ENCOUNTER — ANESTHESIA EVENT (OUTPATIENT)
Dept: PERIOP | Facility: HOSPITAL | Age: 49
End: 2024-10-01
Payer: COMMERCIAL

## 2024-10-01 VITALS
RESPIRATION RATE: 16 BRPM | BODY MASS INDEX: 34.44 KG/M2 | WEIGHT: 240.6 LBS | TEMPERATURE: 97.6 F | HEIGHT: 70 IN | HEART RATE: 87 BPM | OXYGEN SATURATION: 96 % | DIASTOLIC BLOOD PRESSURE: 81 MMHG | SYSTOLIC BLOOD PRESSURE: 142 MMHG

## 2024-10-01 PROBLEM — E78.5 HYPERLIPIDEMIA: Status: ACTIVE | Noted: 2024-10-01

## 2024-10-01 PROBLEM — Z86.73 HISTORY OF ISCHEMIC STROKE: Status: ACTIVE | Noted: 2024-10-01

## 2024-10-01 PROBLEM — S06.9XAA TBI (TRAUMATIC BRAIN INJURY): Status: ACTIVE | Noted: 2024-10-01

## 2024-10-01 PROBLEM — K37 APPENDICITIS: Status: ACTIVE | Noted: 2024-10-01

## 2024-10-01 LAB
ALBUMIN SERPL-MCNC: 4.1 G/DL (ref 3.5–5.2)
ALBUMIN SERPL-MCNC: 4.5 G/DL (ref 3.5–5.2)
ALBUMIN/GLOB SERPL: 1.6 G/DL
ALBUMIN/GLOB SERPL: 1.7 G/DL
ALP SERPL-CCNC: 53 U/L (ref 39–117)
ALP SERPL-CCNC: 66 U/L (ref 39–117)
ALT SERPL W P-5'-P-CCNC: 26 U/L (ref 1–41)
ALT SERPL W P-5'-P-CCNC: 31 U/L (ref 1–41)
ANION GAP SERPL CALCULATED.3IONS-SCNC: 11 MMOL/L (ref 5–15)
ANION GAP SERPL CALCULATED.3IONS-SCNC: 12 MMOL/L (ref 5–15)
AST SERPL-CCNC: 24 U/L (ref 1–40)
AST SERPL-CCNC: 28 U/L (ref 1–40)
BASOPHILS # BLD AUTO: 0.07 10*3/MM3 (ref 0–0.2)
BASOPHILS # BLD MANUAL: 0 10*3/MM3 (ref 0–0.2)
BASOPHILS NFR BLD AUTO: 0.6 % (ref 0–1.5)
BASOPHILS NFR BLD MANUAL: 0 % (ref 0–1.5)
BILIRUB SERPL-MCNC: 0.3 MG/DL (ref 0–1.2)
BILIRUB SERPL-MCNC: 0.4 MG/DL (ref 0–1.2)
BILIRUB UR QL STRIP: NEGATIVE
BUN SERPL-MCNC: 12 MG/DL (ref 6–20)
BUN SERPL-MCNC: 12 MG/DL (ref 6–20)
BUN/CREAT SERPL: 14.5 (ref 7–25)
BUN/CREAT SERPL: 16.2 (ref 7–25)
CALCIUM SPEC-SCNC: 8.8 MG/DL (ref 8.6–10.5)
CALCIUM SPEC-SCNC: 9.4 MG/DL (ref 8.6–10.5)
CHLORIDE SERPL-SCNC: 103 MMOL/L (ref 98–107)
CHLORIDE SERPL-SCNC: 104 MMOL/L (ref 98–107)
CLARITY UR: CLEAR
CO2 SERPL-SCNC: 24 MMOL/L (ref 22–29)
CO2 SERPL-SCNC: 27 MMOL/L (ref 22–29)
COLOR UR: YELLOW
CREAT SERPL-MCNC: 0.74 MG/DL (ref 0.76–1.27)
CREAT SERPL-MCNC: 0.83 MG/DL (ref 0.76–1.27)
D-LACTATE SERPL-SCNC: 1.5 MMOL/L (ref 0.5–2)
DEPRECATED RDW RBC AUTO: 41.6 FL (ref 37–54)
DEPRECATED RDW RBC AUTO: 41.9 FL (ref 37–54)
EGFRCR SERPLBLD CKD-EPI 2021: 108 ML/MIN/1.73
EGFRCR SERPLBLD CKD-EPI 2021: 111.8 ML/MIN/1.73
EOSINOPHIL # BLD AUTO: 0.12 10*3/MM3 (ref 0–0.4)
EOSINOPHIL # BLD MANUAL: 0 10*3/MM3 (ref 0–0.4)
EOSINOPHIL NFR BLD AUTO: 1 % (ref 0.3–6.2)
EOSINOPHIL NFR BLD MANUAL: 0 % (ref 0.3–6.2)
ERYTHROCYTE [DISTWIDTH] IN BLOOD BY AUTOMATED COUNT: 12.8 % (ref 12.3–15.4)
ERYTHROCYTE [DISTWIDTH] IN BLOOD BY AUTOMATED COUNT: 12.8 % (ref 12.3–15.4)
GLOBULIN UR ELPH-MCNC: 2.4 GM/DL
GLOBULIN UR ELPH-MCNC: 2.8 GM/DL
GLUCOSE SERPL-MCNC: 104 MG/DL (ref 65–99)
GLUCOSE SERPL-MCNC: 91 MG/DL (ref 65–99)
GLUCOSE UR STRIP-MCNC: NEGATIVE MG/DL
HCT VFR BLD AUTO: 42.3 % (ref 37.5–51)
HCT VFR BLD AUTO: 45.5 % (ref 37.5–51)
HGB BLD-MCNC: 14.6 G/DL (ref 13–17.7)
HGB BLD-MCNC: 15.7 G/DL (ref 13–17.7)
HGB UR QL STRIP.AUTO: NEGATIVE
HOLD SPECIMEN: NORMAL
IMM GRANULOCYTES # BLD AUTO: 0.03 10*3/MM3 (ref 0–0.05)
IMM GRANULOCYTES NFR BLD AUTO: 0.2 % (ref 0–0.5)
INR PPP: 0.9 (ref 0.89–1.12)
KETONES UR QL STRIP: NEGATIVE
LEUKOCYTE ESTERASE UR QL STRIP.AUTO: NEGATIVE
LIPASE SERPL-CCNC: 65 U/L (ref 13–60)
LYMPHOCYTES # BLD AUTO: 1.86 10*3/MM3 (ref 0.7–3.1)
LYMPHOCYTES # BLD MANUAL: 1.56 10*3/MM3 (ref 0.7–3.1)
LYMPHOCYTES NFR BLD AUTO: 15 % (ref 19.6–45.3)
LYMPHOCYTES NFR BLD MANUAL: 10 % (ref 5–12)
MCH RBC QN AUTO: 30.7 PG (ref 26.6–33)
MCH RBC QN AUTO: 30.9 PG (ref 26.6–33)
MCHC RBC AUTO-ENTMCNC: 34.5 G/DL (ref 31.5–35.7)
MCHC RBC AUTO-ENTMCNC: 34.5 G/DL (ref 31.5–35.7)
MCV RBC AUTO: 89.1 FL (ref 79–97)
MCV RBC AUTO: 89.6 FL (ref 79–97)
MONOCYTES # BLD AUTO: 0.87 10*3/MM3 (ref 0.1–0.9)
MONOCYTES # BLD: 1.04 10*3/MM3 (ref 0.1–0.9)
MONOCYTES NFR BLD AUTO: 7 % (ref 5–12)
NEUTROPHILS # BLD AUTO: 7.79 10*3/MM3 (ref 1.7–7)
NEUTROPHILS NFR BLD AUTO: 76.2 % (ref 42.7–76)
NEUTROPHILS NFR BLD AUTO: 9.42 10*3/MM3 (ref 1.7–7)
NEUTROPHILS NFR BLD MANUAL: 66 % (ref 42.7–76)
NEUTS BAND NFR BLD MANUAL: 9 % (ref 0–5)
NITRITE UR QL STRIP: NEGATIVE
NRBC BLD AUTO-RTO: 0 /100 WBC (ref 0–0.2)
NRBC SPEC MANUAL: 0 /100 WBC (ref 0–0.2)
PH UR STRIP.AUTO: 7 [PH] (ref 5–8)
PLAT MORPH BLD: NORMAL
PLATELET # BLD AUTO: 222 10*3/MM3 (ref 140–450)
PLATELET # BLD AUTO: 235 10*3/MM3 (ref 140–450)
PMV BLD AUTO: 10 FL (ref 6–12)
PMV BLD AUTO: 9.8 FL (ref 6–12)
POTASSIUM SERPL-SCNC: 3.9 MMOL/L (ref 3.5–5.2)
POTASSIUM SERPL-SCNC: 4 MMOL/L (ref 3.5–5.2)
PROT SERPL-MCNC: 6.5 G/DL (ref 6–8.5)
PROT SERPL-MCNC: 7.3 G/DL (ref 6–8.5)
PROT UR QL STRIP: NEGATIVE
PROTHROMBIN TIME: 12.2 SECONDS (ref 12.2–14.5)
RBC # BLD AUTO: 4.75 10*6/MM3 (ref 4.14–5.8)
RBC # BLD AUTO: 5.08 10*6/MM3 (ref 4.14–5.8)
RBC MORPH BLD: NORMAL
SMUDGE CELLS BLD QL SMEAR: ABNORMAL
SODIUM SERPL-SCNC: 138 MMOL/L (ref 136–145)
SODIUM SERPL-SCNC: 143 MMOL/L (ref 136–145)
SP GR UR STRIP: 1.02 (ref 1–1.03)
UROBILINOGEN UR QL STRIP: NORMAL
VARIANT LYMPHS NFR BLD MANUAL: 11 % (ref 19.6–45.3)
VARIANT LYMPHS NFR BLD MANUAL: 4 % (ref 0–5)
WBC NRBC COR # BLD AUTO: 10.39 10*3/MM3 (ref 3.4–10.8)
WBC NRBC COR # BLD AUTO: 12.37 10*3/MM3 (ref 3.4–10.8)
WHOLE BLOOD HOLD COAG: NORMAL
WHOLE BLOOD HOLD SPECIMEN: NORMAL

## 2024-10-01 PROCEDURE — 85027 COMPLETE CBC AUTOMATED: CPT

## 2024-10-01 PROCEDURE — 99223 1ST HOSP IP/OBS HIGH 75: CPT

## 2024-10-01 PROCEDURE — 25010000002 CEFTRIAXONE PER 250 MG: Performed by: PHYSICIAN ASSISTANT

## 2024-10-01 PROCEDURE — 25010000002 LIDOCAINE PF 1% 1 % SOLUTION

## 2024-10-01 PROCEDURE — 85610 PROTHROMBIN TIME: CPT

## 2024-10-01 PROCEDURE — 87040 BLOOD CULTURE FOR BACTERIA: CPT | Performed by: PHYSICIAN ASSISTANT

## 2024-10-01 PROCEDURE — 25010000002 METRONIDAZOLE 500 MG/100ML SOLUTION: Performed by: SURGERY

## 2024-10-01 PROCEDURE — 25010000002 ONDANSETRON PER 1 MG

## 2024-10-01 PROCEDURE — 85007 BL SMEAR W/DIFF WBC COUNT: CPT

## 2024-10-01 PROCEDURE — 74177 CT ABD & PELVIS W/CONTRAST: CPT

## 2024-10-01 PROCEDURE — 25010000002 FENTANYL CITRATE (PF) 100 MCG/2ML SOLUTION

## 2024-10-01 PROCEDURE — 99238 HOSP IP/OBS DSCHRG MGMT 30/<: CPT | Performed by: INTERNAL MEDICINE

## 2024-10-01 PROCEDURE — 25010000002 SUGAMMADEX 200 MG/2ML SOLUTION

## 2024-10-01 PROCEDURE — 25010000002 DEXAMETHASONE PER 1 MG

## 2024-10-01 PROCEDURE — G0378 HOSPITAL OBSERVATION PER HR: HCPCS

## 2024-10-01 PROCEDURE — 25810000003 LACTATED RINGERS PER 1000 ML: Performed by: ANESTHESIOLOGY

## 2024-10-01 PROCEDURE — 88304 TISSUE EXAM BY PATHOLOGIST: CPT | Performed by: SURGERY

## 2024-10-01 PROCEDURE — 25010000002 GLYCOPYRROLATE 1 MG/5ML SOLUTION

## 2024-10-01 PROCEDURE — 94799 UNLISTED PULMONARY SVC/PX: CPT

## 2024-10-01 PROCEDURE — 93010 ELECTROCARDIOGRAM REPORT: CPT | Performed by: INTERNAL MEDICINE

## 2024-10-01 PROCEDURE — 25010000002 LIDOCAINE PF 1% 1 % SOLUTION: Performed by: SURGERY

## 2024-10-01 PROCEDURE — 80053 COMPREHEN METABOLIC PANEL: CPT

## 2024-10-01 PROCEDURE — 25010000002 PROPOFOL 10 MG/ML EMULSION

## 2024-10-01 PROCEDURE — 36415 COLL VENOUS BLD VENIPUNCTURE: CPT

## 2024-10-01 PROCEDURE — 25510000001 IOPAMIDOL 61 % SOLUTION: Performed by: EMERGENCY MEDICINE

## 2024-10-01 PROCEDURE — 93005 ELECTROCARDIOGRAM TRACING: CPT

## 2024-10-01 DEVICE — THE ECHELON, ECHELON ENDOPATH™ AND ECHELON FLEX™ FAMILIES OF ENDOSCOPIC LINEAR CUTTERS AND RELOADS ARE STERILE, SINGLE PATIENT USE INSTRUMENTS THAT SIMULTANEOUSLY CUT AND STAPLE TISSUE. THERE ARE SIX STAGGERED ROWS OF STAPLES, THREE ON EITHER SIDE OF THE CUT LINE. THE 45 MM INSTRUMENTS HAVE A STAPLE LINE THATIS APPROXIMATELY 45 MM LONG AND A CUT LINE THAT IS APPROXIMATELY 42 MM LONG. THE SHAFT CAN ROTATE FREELY IN BOTH DIRECTIONS AND AN ARTICULATION MECHANISM ON ARTICULATING INSTRUMENTS ENABLES BENDING THE DISTAL PORTIONOF THE SHAFT TO FACILITATE LATERAL ACCESS OF THE OPERATIVE SITE.THE INSTRUMENTS ARE SHIPPED WITHOUT A RELOAD AND MUST BE LOADED PRIOR TO USE. A STAPLE RETAINING CAP ON THE RELOAD PROTECTS THE STAPLE LEG POINTS DURING SHIPPING AND TRANSPORTATION. THE INSTRUMENTS’ LOCK-OUT FEATURE IS DESIGNED TO PREVENT A USED RELOAD FROM BEING REFIRED.
Type: IMPLANTABLE DEVICE | Site: ABDOMEN | Status: FUNCTIONAL
Brand: ECHELON ENDOPATH

## 2024-10-01 RX ORDER — LIDOCAINE HYDROCHLORIDE 10 MG/ML
INJECTION, SOLUTION EPIDURAL; INFILTRATION; INTRACAUDAL; PERINEURAL AS NEEDED
Status: DISCONTINUED | OUTPATIENT
Start: 2024-10-01 | End: 2024-10-01 | Stop reason: HOSPADM

## 2024-10-01 RX ORDER — SODIUM CHLORIDE 0.9 % (FLUSH) 0.9 %
10 SYRINGE (ML) INJECTION AS NEEDED
Status: DISCONTINUED | OUTPATIENT
Start: 2024-10-01 | End: 2024-10-01 | Stop reason: HOSPADM

## 2024-10-01 RX ORDER — HYDROMORPHONE HYDROCHLORIDE 1 MG/ML
0.5 INJECTION, SOLUTION INTRAMUSCULAR; INTRAVENOUS; SUBCUTANEOUS
Status: DISCONTINUED | OUTPATIENT
Start: 2024-10-01 | End: 2024-10-01 | Stop reason: HOSPADM

## 2024-10-01 RX ORDER — SODIUM CHLORIDE 0.9 % (FLUSH) 0.9 %
10 SYRINGE (ML) INJECTION EVERY 12 HOURS SCHEDULED
Status: DISCONTINUED | OUTPATIENT
Start: 2024-10-01 | End: 2024-10-01 | Stop reason: HOSPADM

## 2024-10-01 RX ORDER — OXYCODONE AND ACETAMINOPHEN 5; 325 MG/1; MG/1
1 TABLET ORAL EVERY 6 HOURS PRN
Qty: 12 TABLET | Refills: 0 | Status: SHIPPED | OUTPATIENT
Start: 2024-10-01

## 2024-10-01 RX ORDER — SODIUM CHLORIDE AND POTASSIUM CHLORIDE 150; 900 MG/100ML; MG/100ML
125 INJECTION, SOLUTION INTRAVENOUS CONTINUOUS
Status: DISCONTINUED | OUTPATIENT
Start: 2024-10-01 | End: 2024-10-01 | Stop reason: HOSPADM

## 2024-10-01 RX ORDER — POLYETHYLENE GLYCOL 3350 17 G/17G
17 POWDER, FOR SOLUTION ORAL DAILY
Status: DISCONTINUED | OUTPATIENT
Start: 2024-10-01 | End: 2024-10-01 | Stop reason: HOSPADM

## 2024-10-01 RX ORDER — EPHEDRINE SULFATE 50 MG/ML
INJECTION INTRAVENOUS AS NEEDED
Status: DISCONTINUED | OUTPATIENT
Start: 2024-10-01 | End: 2024-10-01 | Stop reason: SURG

## 2024-10-01 RX ORDER — HYDROCODONE BITARTRATE AND ACETAMINOPHEN 5; 325 MG/1; MG/1
1 TABLET ORAL ONCE AS NEEDED
Status: DISCONTINUED | OUTPATIENT
Start: 2024-10-01 | End: 2024-10-01 | Stop reason: HOSPADM

## 2024-10-01 RX ORDER — PROPOFOL 10 MG/ML
VIAL (ML) INTRAVENOUS AS NEEDED
Status: DISCONTINUED | OUTPATIENT
Start: 2024-10-01 | End: 2024-10-01 | Stop reason: SURG

## 2024-10-01 RX ORDER — ONDANSETRON 2 MG/ML
4 INJECTION INTRAMUSCULAR; INTRAVENOUS EVERY 6 HOURS PRN
Status: DISCONTINUED | OUTPATIENT
Start: 2024-10-01 | End: 2024-10-01 | Stop reason: HOSPADM

## 2024-10-01 RX ORDER — IOPAMIDOL 612 MG/ML
85 INJECTION, SOLUTION INTRAVASCULAR
Status: COMPLETED | OUTPATIENT
Start: 2024-10-01 | End: 2024-10-01

## 2024-10-01 RX ORDER — FAMOTIDINE 20 MG/1
20 TABLET, FILM COATED ORAL
Status: COMPLETED | OUTPATIENT
Start: 2024-10-01 | End: 2024-10-01

## 2024-10-01 RX ORDER — GLYCOPYRROLATE 0.2 MG/ML
INJECTION INTRAMUSCULAR; INTRAVENOUS AS NEEDED
Status: DISCONTINUED | OUTPATIENT
Start: 2024-10-01 | End: 2024-10-01 | Stop reason: SURG

## 2024-10-01 RX ORDER — METRONIDAZOLE 500 MG/100ML
500 INJECTION, SOLUTION INTRAVENOUS EVERY 8 HOURS
Status: DISCONTINUED | OUTPATIENT
Start: 2024-10-01 | End: 2024-10-01 | Stop reason: HOSPADM

## 2024-10-01 RX ORDER — HYDRALAZINE HYDROCHLORIDE 20 MG/ML
5 INJECTION INTRAMUSCULAR; INTRAVENOUS
Status: DISCONTINUED | OUTPATIENT
Start: 2024-10-01 | End: 2024-10-01 | Stop reason: HOSPADM

## 2024-10-01 RX ORDER — BISACODYL 5 MG/1
5 TABLET, DELAYED RELEASE ORAL DAILY PRN
Status: DISCONTINUED | OUTPATIENT
Start: 2024-10-01 | End: 2024-10-01

## 2024-10-01 RX ORDER — BISACODYL 10 MG
10 SUPPOSITORY, RECTAL RECTAL DAILY PRN
Status: DISCONTINUED | OUTPATIENT
Start: 2024-10-01 | End: 2024-10-01

## 2024-10-01 RX ORDER — SODIUM CHLORIDE 0.9 % (FLUSH) 0.9 %
3-10 SYRINGE (ML) INJECTION AS NEEDED
Status: DISCONTINUED | OUTPATIENT
Start: 2024-10-01 | End: 2024-10-01 | Stop reason: HOSPADM

## 2024-10-01 RX ORDER — ACETAMINOPHEN 500 MG
500 TABLET ORAL EVERY 6 HOURS
Status: DISCONTINUED | OUTPATIENT
Start: 2024-10-01 | End: 2024-10-01 | Stop reason: HOSPADM

## 2024-10-01 RX ORDER — ONDANSETRON 2 MG/ML
INJECTION INTRAMUSCULAR; INTRAVENOUS AS NEEDED
Status: DISCONTINUED | OUTPATIENT
Start: 2024-10-01 | End: 2024-10-01 | Stop reason: SURG

## 2024-10-01 RX ORDER — ROCURONIUM BROMIDE 10 MG/ML
INJECTION, SOLUTION INTRAVENOUS AS NEEDED
Status: DISCONTINUED | OUTPATIENT
Start: 2024-10-01 | End: 2024-10-01 | Stop reason: SURG

## 2024-10-01 RX ORDER — IPRATROPIUM BROMIDE AND ALBUTEROL SULFATE 2.5; .5 MG/3ML; MG/3ML
3 SOLUTION RESPIRATORY (INHALATION) ONCE AS NEEDED
Status: DISCONTINUED | OUTPATIENT
Start: 2024-10-01 | End: 2024-10-01 | Stop reason: HOSPADM

## 2024-10-01 RX ORDER — CARISOPRODOL 350 MG/1
350 TABLET ORAL EVERY 12 HOURS PRN
Status: DISCONTINUED | OUTPATIENT
Start: 2024-10-01 | End: 2024-10-01 | Stop reason: HOSPADM

## 2024-10-01 RX ORDER — ONDANSETRON 4 MG/1
4 TABLET, ORALLY DISINTEGRATING ORAL EVERY 6 HOURS PRN
Status: DISCONTINUED | OUTPATIENT
Start: 2024-10-01 | End: 2024-10-01 | Stop reason: HOSPADM

## 2024-10-01 RX ORDER — DROPERIDOL 2.5 MG/ML
0.62 INJECTION, SOLUTION INTRAMUSCULAR; INTRAVENOUS
Status: DISCONTINUED | OUTPATIENT
Start: 2024-10-01 | End: 2024-10-01 | Stop reason: HOSPADM

## 2024-10-01 RX ORDER — SODIUM CHLORIDE 9 MG/ML
40 INJECTION, SOLUTION INTRAVENOUS AS NEEDED
Status: DISCONTINUED | OUTPATIENT
Start: 2024-10-01 | End: 2024-10-01 | Stop reason: HOSPADM

## 2024-10-01 RX ORDER — METRONIDAZOLE 500 MG/1
500 TABLET ORAL EVERY 8 HOURS
Status: DISCONTINUED | OUTPATIENT
Start: 2024-10-01 | End: 2024-10-01

## 2024-10-01 RX ORDER — MELOXICAM 7.5 MG/1
7.5 TABLET ORAL DAILY
Status: DISCONTINUED | OUTPATIENT
Start: 2024-10-01 | End: 2024-10-01 | Stop reason: HOSPADM

## 2024-10-01 RX ORDER — POLYETHYLENE GLYCOL 3350 17 G/17G
17 POWDER, FOR SOLUTION ORAL DAILY PRN
Status: DISCONTINUED | OUTPATIENT
Start: 2024-10-01 | End: 2024-10-01

## 2024-10-01 RX ORDER — LIDOCAINE 50 MG/G
1 PATCH TOPICAL EVERY 24 HOURS
Qty: 6 PATCH | Refills: 0 | Status: SHIPPED | OUTPATIENT
Start: 2024-10-01 | End: 2024-10-07

## 2024-10-01 RX ORDER — FENTANYL CITRATE 50 UG/ML
INJECTION, SOLUTION INTRAMUSCULAR; INTRAVENOUS AS NEEDED
Status: DISCONTINUED | OUTPATIENT
Start: 2024-10-01 | End: 2024-10-01 | Stop reason: SURG

## 2024-10-01 RX ORDER — HEPARIN SODIUM 5000 [USP'U]/ML
5000 INJECTION, SOLUTION INTRAVENOUS; SUBCUTANEOUS EVERY 8 HOURS SCHEDULED
Status: DISCONTINUED | OUTPATIENT
Start: 2024-10-01 | End: 2024-10-01 | Stop reason: HOSPADM

## 2024-10-01 RX ORDER — ONDANSETRON 2 MG/ML
4 INJECTION INTRAMUSCULAR; INTRAVENOUS ONCE AS NEEDED
Status: DISCONTINUED | OUTPATIENT
Start: 2024-10-01 | End: 2024-10-01 | Stop reason: HOSPADM

## 2024-10-01 RX ORDER — LIDOCAINE 4 G/G
1 PATCH TOPICAL
Status: DISCONTINUED | OUTPATIENT
Start: 2024-10-01 | End: 2024-10-01 | Stop reason: HOSPADM

## 2024-10-01 RX ORDER — DEXAMETHASONE SODIUM PHOSPHATE 4 MG/ML
INJECTION, SOLUTION INTRA-ARTICULAR; INTRALESIONAL; INTRAMUSCULAR; INTRAVENOUS; SOFT TISSUE AS NEEDED
Status: DISCONTINUED | OUTPATIENT
Start: 2024-10-01 | End: 2024-10-01 | Stop reason: SURG

## 2024-10-01 RX ORDER — ACETAMINOPHEN 160 MG/5ML
650 SOLUTION ORAL EVERY 4 HOURS PRN
Status: DISCONTINUED | OUTPATIENT
Start: 2024-10-01 | End: 2024-10-01

## 2024-10-01 RX ORDER — BUPIVACAINE HCL/0.9 % NACL/PF 0.125 %
PLASTIC BAG, INJECTION (ML) EPIDURAL AS NEEDED
Status: DISCONTINUED | OUTPATIENT
Start: 2024-10-01 | End: 2024-10-01 | Stop reason: SURG

## 2024-10-01 RX ORDER — LIDOCAINE HYDROCHLORIDE 10 MG/ML
INJECTION, SOLUTION EPIDURAL; INFILTRATION; INTRACAUDAL; PERINEURAL AS NEEDED
Status: DISCONTINUED | OUTPATIENT
Start: 2024-10-01 | End: 2024-10-01 | Stop reason: SURG

## 2024-10-01 RX ORDER — FENTANYL CITRATE 50 UG/ML
50 INJECTION, SOLUTION INTRAMUSCULAR; INTRAVENOUS
Status: DISCONTINUED | OUTPATIENT
Start: 2024-10-01 | End: 2024-10-01 | Stop reason: HOSPADM

## 2024-10-01 RX ORDER — METRONIDAZOLE 500 MG/1
500 TABLET ORAL ONCE
Status: COMPLETED | OUTPATIENT
Start: 2024-10-01 | End: 2024-10-01

## 2024-10-01 RX ORDER — FAMOTIDINE 20 MG/1
20 TABLET, FILM COATED ORAL
Status: DISCONTINUED | OUTPATIENT
Start: 2024-10-01 | End: 2024-10-01 | Stop reason: HOSPADM

## 2024-10-01 RX ORDER — AMOXICILLIN 250 MG
2 CAPSULE ORAL 2 TIMES DAILY PRN
Status: DISCONTINUED | OUTPATIENT
Start: 2024-10-01 | End: 2024-10-01

## 2024-10-01 RX ORDER — SODIUM CHLORIDE, SODIUM LACTATE, POTASSIUM CHLORIDE, CALCIUM CHLORIDE 600; 310; 30; 20 MG/100ML; MG/100ML; MG/100ML; MG/100ML
9 INJECTION, SOLUTION INTRAVENOUS CONTINUOUS PRN
Status: DISCONTINUED | OUTPATIENT
Start: 2024-10-01 | End: 2024-10-01 | Stop reason: HOSPADM

## 2024-10-01 RX ORDER — ASPIRIN 81 MG/1
81 TABLET ORAL DAILY
COMMUNITY

## 2024-10-01 RX ORDER — DROPERIDOL 2.5 MG/ML
0.62 INJECTION, SOLUTION INTRAMUSCULAR; INTRAVENOUS ONCE AS NEEDED
Status: DISCONTINUED | OUTPATIENT
Start: 2024-10-01 | End: 2024-10-01 | Stop reason: HOSPADM

## 2024-10-01 RX ORDER — ACETAMINOPHEN 325 MG/1
650 TABLET ORAL EVERY 4 HOURS PRN
Status: DISCONTINUED | OUTPATIENT
Start: 2024-10-01 | End: 2024-10-01

## 2024-10-01 RX ORDER — SODIUM CHLORIDE 0.9 % (FLUSH) 0.9 %
3 SYRINGE (ML) INJECTION EVERY 12 HOURS SCHEDULED
Status: DISCONTINUED | OUTPATIENT
Start: 2024-10-01 | End: 2024-10-01 | Stop reason: HOSPADM

## 2024-10-01 RX ORDER — ACETAMINOPHEN 650 MG/1
650 SUPPOSITORY RECTAL EVERY 4 HOURS PRN
Status: DISCONTINUED | OUTPATIENT
Start: 2024-10-01 | End: 2024-10-01

## 2024-10-01 RX ORDER — LABETALOL HYDROCHLORIDE 5 MG/ML
5 INJECTION, SOLUTION INTRAVENOUS
Status: DISCONTINUED | OUTPATIENT
Start: 2024-10-01 | End: 2024-10-01 | Stop reason: HOSPADM

## 2024-10-01 RX ADMIN — SODIUM CHLORIDE, POTASSIUM CHLORIDE, SODIUM LACTATE AND CALCIUM CHLORIDE 9 ML/HR: 600; 310; 30; 20 INJECTION, SOLUTION INTRAVENOUS at 06:32

## 2024-10-01 RX ADMIN — Medication 100 MCG: at 07:36

## 2024-10-01 RX ADMIN — SODIUM CHLORIDE 2000 MG: 900 INJECTION INTRAVENOUS at 03:39

## 2024-10-01 RX ADMIN — METRONIDAZOLE 500 MG: 500 TABLET ORAL at 03:20

## 2024-10-01 RX ADMIN — ONDANSETRON 4 MG: 2 INJECTION INTRAMUSCULAR; INTRAVENOUS at 08:02

## 2024-10-01 RX ADMIN — IOPAMIDOL 85 ML: 612 INJECTION, SOLUTION INTRAVENOUS at 00:36

## 2024-10-01 RX ADMIN — EPHEDRINE SULFATE 5 MG: 50 INJECTION INTRAVENOUS at 07:34

## 2024-10-01 RX ADMIN — LIDOCAINE HYDROCHLORIDE 50 MG: 10 INJECTION, SOLUTION EPIDURAL; INFILTRATION; INTRACAUDAL; PERINEURAL at 07:15

## 2024-10-01 RX ADMIN — ROCURONIUM BROMIDE 50 MG: 10 INJECTION INTRAVENOUS at 07:15

## 2024-10-01 RX ADMIN — Medication 10 ML: at 10:04

## 2024-10-01 RX ADMIN — FAMOTIDINE 20 MG: 20 TABLET, FILM COATED ORAL at 06:32

## 2024-10-01 RX ADMIN — POLYETHYLENE GLYCOL 3350 17 G: 17 POWDER, FOR SOLUTION ORAL at 10:04

## 2024-10-01 RX ADMIN — Medication 150 MCG: at 07:22

## 2024-10-01 RX ADMIN — ACETAMINOPHEN 500 MG: 500 TABLET ORAL at 10:04

## 2024-10-01 RX ADMIN — GLYCOPYRROLATE 0.1 MCG: 0.2 INJECTION INTRAMUSCULAR; INTRAVENOUS at 07:32

## 2024-10-01 RX ADMIN — DEXAMETHASONE SODIUM PHOSPHATE 8 MG: 4 INJECTION INTRA-ARTICULAR; INTRALESIONAL; INTRAMUSCULAR; INTRAVENOUS; SOFT TISSUE at 07:22

## 2024-10-01 RX ADMIN — SUGAMMADEX 200 MG: 100 INJECTION, SOLUTION INTRAVENOUS at 08:01

## 2024-10-01 RX ADMIN — FENTANYL CITRATE 100 MCG: 50 INJECTION, SOLUTION INTRAMUSCULAR; INTRAVENOUS at 07:15

## 2024-10-01 RX ADMIN — Medication 10 ML: at 06:32

## 2024-10-01 RX ADMIN — ROCURONIUM BROMIDE 15 MG: 10 INJECTION INTRAVENOUS at 07:30

## 2024-10-01 RX ADMIN — METRONIDAZOLE 500 MG: 500 INJECTION, SOLUTION INTRAVENOUS at 11:13

## 2024-10-01 RX ADMIN — PROPOFOL 200 MG: 10 INJECTION, EMULSION INTRAVENOUS at 07:15

## 2024-10-01 NOTE — DISCHARGE SUMMARY
[facility] Medicine Services  DISCHARGE SUMMARY    Patient Name: Louie Levy  : 1975  MRN: [id number]    Date of Admission: 2024 10:58 PM  Date of Discharge:  10/1/2024  Primary Care Physician: Trina Feldman DO    Consults       Date and Time Order Name Status Description    10/1/2024  3:37 AM Inpatient General Surgery Consult Completed             Hospital Course     Presenting Problem: RLQ pain     Active Hospital Problems    Diagnosis  POA    **Appendicitis [K37]  Yes    History of ischemic stroke [Z86.73]  Not Applicable    TBI (traumatic brain injury) [S06.9XAA]  Unknown    Hyperlipidemia [E78.5]  Unknown      Resolved Hospital Problems   No resolved problems to display.          Hospital Course:  Louie Levy is a 49 y.o. male w history of hyperlipidemia, stroke, traumatic brain injury.   He was admitted for acute appendicitis.    Appendicitis  - This was seen on CT.  Abx were given.  Dr. Cassidy did lap appy on 10/1/2024.  There was no perforation  - He ate afterward, felt fine and wished to go home.       Discharge Follow Up Recommendations for outpatient labs/diagnostics:   - FU with Dr Cassidy in 2 weeks     Day of Discharge     HPI:   ate without difficulty.  Mild soreness.  No nausea     Review of Systems  No fevers     Vital Signs:   Temp:  [97.5 °F (36.4 °C)-98.7 °F (37.1 °C)] 97.9 °F (36.6 °C)  Heart Rate:  [77-92] 79  Resp:  [14-22] 18  BP: (120-148)/(70-95) 143/86  Flow (L/min):  [4] 4      Physical Exam:  Gen:  WD/WN  Neuro: alert and oriented,  HEENT:  NC/AT R eyepatch   Neck:  Supple, no LAD  Heart RRR no murmur, rub, or gallop  Abd:  Soft, min tender, dressed incisions   Extrem:  No c/c/e      Pertinent  and/or Most Recent Results     LAB RESULTS:      Lab 10/01/24  0415 24  2357   WBC 10.39 12.37*   HEMOGLOBIN 14.6 15.7   HEMATOCRIT 42.3 45.5   PLATELETS 222 235   NEUTROS ABS 7.79* 9.42*   IMMATURE GRANS (ABS)  --  0.03   LYMPHS ABS  --   1.86   MONOS ABS  --  0.87   EOS ABS 0.00 0.12   MCV 89.1 89.6   LACTATE  --  1.5   PROTIME 12.2  --          Lab 10/01/24  0415 09/30/24  2357   SODIUM 138 143   POTASSIUM 3.9 4.0   CHLORIDE 103 104   CO2 24.0 27.0   ANION GAP 11.0 12.0   BUN 12 12   CREATININE 0.74* 0.83   EGFR 111.8 108.0   GLUCOSE 104* 91   CALCIUM 8.8 9.4         Lab 10/01/24  0415 09/30/24  2357   TOTAL PROTEIN 6.5 7.3   ALBUMIN 4.1 4.5   GLOBULIN 2.4 2.8   ALT (SGPT) 26 31   AST (SGOT) 24 28   BILIRUBIN 0.3 0.4   ALK PHOS 53 66   LIPASE  --  65*         Lab 10/01/24  0415   PROTIME 12.2   INR 0.90                 Brief Urine Lab Results  (Last result in the past 365 days)        Color   Clarity   Blood   Leuk Est   Nitrite   Protein   CREAT   Urine HCG        09/30/24 2358 Yellow   Clear   Negative   Negative   Negative   Negative                 Microbiology Results (last 10 days)       ** No results found for the last 240 hours. **            CT Abdomen Pelvis With Contrast    Result Date: 10/1/2024  CT ABDOMEN PELVIS W CONTRAST Date of Exam: 10/1/2024 12:21 AM EDT Indication: Right lower quadrant abdominal pain. Comparison: None available. Technique: Axial CT images were obtained of the abdomen and pelvis following the uneventful intravenous administration of 85 mL Isovue-300. Reconstructed coronal and sagittal images were also obtained. Automated exposure control and iterative construction methods were used. Findings: Heart size is normal. Distal esophagus is unremarkable. There is mild dependent bibasilar atelectasis. There are no acute findings in the superficial soft tissues. No acute osseous abnormality or destructive bone lesion. There is chronic bilateral nondisplaced L5 spondylolysis without spondylolisthesis. There are otherwise mild degenerative changes in the lumbar spine. There is low-attenuation throughout the liver suggesting steatosis. The gallbladder is contracted. The bile ducts, pancreas, stomach, duodenum, spleen and  adrenal glands appear normal. Kidney parenchyma enhances homogeneously. There is no urolithiasis or  hydronephrosis. Prostate gland is normal size. There is a small central prostatic calcification, likely incidental. The appendix demonstrates mucosal hyperenhancement and there is mild dilatation of the appendiceal tip measuring up to 12 mm. There is a small appendicolith in the distal appendix. There is no obvious inflammatory change adjacent to the appendix and there is no fluid collection or extraluminal gas. The colon is unremarkable. The small bowel is nondistended. The abdominal and pelvic vasculature appear within normal limits. No ascites, pneumoperitoneum or lymphadenopathy.     Impression: 1.There is dilatation of the appendiceal tip up to 12 mm and there is appendiceal mucosal hyperenhancement and a small appendicolith. Appearance may represent early appendicitis. 2.Hepatic steatosis. 3.Chronic bilateral L5 spondylolysis without spondylolisthesis. Electronically Signed: Jovany Cruz MD  10/1/2024 1:25 AM EDT  Workstation ID: [id number]             Results for orders placed during the hospital encounter of 04/01/23    Adult Transthoracic Echo Complete W/ Cont if Necessary Per Protocol (With Agitated Saline)    Interpretation Summary    Left ventricular ejection fraction appears to be 61 - 65%.    Saline test results are negative.      Plan for Follow-up of Pending Labs/Results: per me and surgeon   Pending Labs       Order Current Status    Blood Culture - Blood, Hand, Left In process    Blood Culture - Blood, Wrist, Right In process    Tissue Pathology Exam In process          Discharge Details        Discharge Medications        New Medications        Instructions Start Date   oxyCODONE-acetaminophen 5-325 MG per tablet  Commonly known as: Percocet   1 tablet, Oral, Every 6 Hours PRN             Continue These Medications        Instructions Start Date   aspirin 81 MG EC tablet   81 mg, Oral, Daily       lidocaine 5 %  Commonly known as: LIDODERM   1 patch, Transdermal, Every 24 Hours, Remove & Discard patch within 12 hours or as directed by MD               No Known Allergies      Discharge Disposition:  Home or Self Care    Diet:  Hospital:  Diet Order   Procedures    Diet: Liquid; Clear Liquid; Fluid Consistency: Thin (IDDSI 0)            Activity: no lifting over 10 lbs x 2 weeks       Restrictions or Other Recommendations:  Postop restriticions reviewed        CODE STATUS:    Code Status and Medical Interventions: CPR (Attempt to Resuscitate); Full Support   Ordered at: 10/01/24 0332     Level Of Support Discussed With:    Patient     Code Status (Patient has no pulse and is not breathing):    CPR (Attempt to Resuscitate)     Medical Interventions (Patient has pulse or is breathing):    Full Support         Adenike Teran MD  10/01/24      Time Spent on Discharge:  I spent  30  minutes on this discharge activity which included: face-to-face encounter with the patient, reviewing the data in the system, coordination of the care with the nursing staff as well as consultants, documentation, and entering orders.

## 2024-10-01 NOTE — OP NOTE
General Surgery Operative Note    Louie Levy  [id number]  1975    Date of Surgery:  10/1/2024 08:10 EDT    Pre-op Diagnosis: Acute appendicitis    Post-op Diagnosis: Acute appendicitis, nonperforated    Procedure: Laparoscopic appendectomy    Surgeon: Chandler Cassidy MD    Anesthesia: General    Fluids: 100 mL of crystalloid    Estimated Blood Loss: Less than 25 mL    Urine Voided: 250 mL    Specimens:  Appendix                  Drains: None    Findings: Acute appendicitis without perforation    Complications: None apparent    History: 49-year-old young gentleman presents with the acute onset of abdominal pain.  This was associated with nausea.  His workup in the emergency room demonstrated a dilated appendix consistent with a early appendicitis.       I rehashed the risks and benefits of appendectomy, including but not limited to: bleeding, infection, injury to adjacent viscera (small bowel, large bowel, the right ureter, the bladder, etc.), cecal stump leak, postoperative abscess formation, an open operation in general, need for re-intervention, and medical issues from a cardiopulmonary and deep venous thrombosis standpoint.  They understood these risks and wished to proceed.    Procedure:      After informed consent, the patient was taken to the operating room.   They were placed in the supine position on the operating table, general anesthesia administered, the abdomen was then prepped and draped in the standard sterile fashion.  A timeout was performed.       The abdomen was entered through a Daniele trocar cutdown at the level of the umbilicus.  Bilateral Vicryl fascial sutures placed and the blunt-tipped Daniele trocar placed intra-abdominally, then the abdomen was insufflated.  The patient was placed in the headdown position and rolled slightly onto the left-hand side. Suprapubic and left lower quadrant 5 mm trocars were placed under direct visualization.  The cecum was medially rotated and  inflamed distal appendix.  A window in the mesoappendix was created with the [place] dissector.  A laparoscopic STACY stapler with a vascular load was used to take the appendiceal base.  The mesoappendix was taken with the [place] LigaSure.  The appendix was then placed in an Endo Catch bag and withdrawn from the abdomen.  I then reinspected the cecal stump staple line and mesoappendix, they were both in good order.  The right upper quadrant, operative site, and pelvis were irrigated till clear.  All trocars were removed under direct visualization.  The 10 mm fascial defect was closed with 0 Vicryl.  All skin incisions were closed with 4-0 Monocryl, Mastisol, Steri-Strips, Telfa, and Tegaderm.       All lap and needle counts were correct at the end of the procedure ×2.  The patient was then transferred to the recovery room in stable condition.    Chandler Cassidy MD     Date: 10/1/2024  Time: 08:10 EDT

## 2024-10-01 NOTE — PLAN OF CARE
Problem: Adult Inpatient Plan of Care  Goal: Plan of Care Review  Outcome: Progressing  Goal: Patient-Specific Goal (Individualized)  Outcome: Progressing  Goal: Absence of Hospital-Acquired Illness or Injury  Outcome: Progressing  Intervention: Identify and Manage Fall Risk  Recent Flowsheet Documentation  Taken 10/1/2024 0621 by Cirilo Melendez RN  Safety Promotion/Fall Prevention: patient off unit  Taken 10/1/2024 0400 by Cirilo Melendez RN  Safety Promotion/Fall Prevention:   activity supervised   assistive device/personal items within reach   clutter free environment maintained   toileting scheduled   safety round/check completed   room organization consistent   nonskid shoes/slippers when out of bed  Intervention: Prevent Skin Injury  Recent Flowsheet Documentation  Taken 10/1/2024 0400 by Cirilo Melendez RN  Body Position: position changed independently  Skin Protection:   adhesive use limited   incontinence pads utilized   tubing/devices free from skin contact  Intervention: Prevent and Manage VTE (Venous Thromboembolism) Risk  Recent Flowsheet Documentation  Taken 10/1/2024 0400 by Cirilo Melendez RN  Activity Management: activity minimized  Intervention: Prevent Infection  Recent Flowsheet Documentation  Taken 10/1/2024 0400 by Cirilo Melendez RN  Infection Prevention:   cohorting utilized   environmental surveillance performed   equipment surfaces disinfected   hand hygiene promoted   rest/sleep promoted   single patient room provided  Goal: Optimal Comfort and Wellbeing  Outcome: Progressing  Intervention: Provide Person-Centered Care  Recent Flowsheet Documentation  Taken 10/1/2024 0400 by Cirilo Melendez RN  Trust Relationship/Rapport:   choices provided   care explained   emotional support provided   empathic listening provided   questions answered   questions encouraged   reassurance provided   thoughts/feelings acknowledged  Goal: Readiness for Transition of Care  Outcome:  Progressing  Intervention: Mutually Develop Transition Plan  Recent Flowsheet Documentation  Taken 10/1/2024 0332 by Cirilo Melendez RN  Transportation Anticipated: family or friend will provide  Patient/Family Anticipated Services at Transition: none  Patient/Family Anticipates Transition to: home with family  Taken 10/1/2024 0329 by Cirilo Melendez RN  Equipment Currently Used at Home: none     Problem: Pain Acute  Goal: Acceptable Pain Control and Functional Ability  Outcome: Progressing  Intervention: Prevent or Manage Pain  Recent Flowsheet Documentation  Taken 10/1/2024 0400 by Cirilo Melendez RN  Medication Review/Management: medications reviewed  Intervention: Optimize Psychosocial Wellbeing  Recent Flowsheet Documentation  Taken 10/1/2024 0400 by Cirilo Melendez RN  Diversional Activities: television   Goal Outcome Evaluation:

## 2024-10-01 NOTE — ANESTHESIA PREPROCEDURE EVALUATION
Anesthesia Evaluation                  Airway   Mallampati: I  TM distance: >3 FB  No difficulty expected  Dental      Pulmonary    Cardiovascular     ECG reviewed    (+) hyperlipidemia    ROS comment: Echo is fine    Neuro/Psych  (+) CVA, psychiatric history  GI/Hepatic/Renal/Endo    (+) obesity    Musculoskeletal     Abdominal    Substance History   (+) alcohol use     OB/GYN          Other                    Anesthesia Plan    ASA 3     general     intravenous induction     Anesthetic plan, risks, benefits, and alternatives have been provided, discussed and informed consent has been obtained with: patient.    Plan discussed with CRNA.    CODE STATUS:    Level Of Support Discussed With: Patient  Code Status (Patient has no pulse and is not breathing): CPR (Attempt to Resuscitate)  Medical Interventions (Patient has pulse or is breathing): Full Support

## 2024-10-01 NOTE — ED NOTES
" Louie Levy    Nursing Report ED to Floor:  Mental status: aaox4  Ambulatory status: independent   Oxygen Therapy:  ra  Cardiac Rhythm: nsr  Admitted from: home  Safety Concerns:  n/a  Social Issues: n/a  ED Room #:  8    ED Nurse Phone Extension - [phone] or may call [phone].      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             Admitting Diagnosis:   The encounter diagnosis was Acute appendicitis, unspecified acute appendicitis type.    Most Recent Vitals:   Vitals:    09/30/24 2244 10/01/24 0015   BP: 145/95 129/77   BP Location:  Right arm   Patient Position:  Lying   Pulse: 77 84   Resp: 17 18   Temp: 98.7 °F (37.1 °C)    TempSrc: Oral    SpO2: 97% 95%   Weight: 109 kg (240 lb)    Height: 177.8 cm (70\")        Active LDAs/IV Access:   Lines, Drains & Airways       Active LDAs       Name Placement date Placement time Site Days    Peripheral IV 09/30/24 2322 Right Hand 09/30/24  2322  Hand  less than 1                    Labs (abnormal labs have a star):   Labs Reviewed   LIPASE - Abnormal; Notable for the following components:       Result Value    Lipase 65 (*)     All other components within normal limits   CBC WITH AUTO DIFFERENTIAL - Abnormal; Notable for the following components:    WBC 12.37 (*)     Neutrophil % 76.2 (*)     " Lymphocyte % 15.0 (*)     Neutrophils, Absolute 9.42 (*)     All other components within normal limits   URINALYSIS W/ MICROSCOPIC IF INDICATED (NO CULTURE) - Normal    Narrative:     Urine microscopic not indicated.   LACTIC ACID, PLASMA - Normal   BLOOD CULTURE   BLOOD CULTURE   RAINBOW DRAW    Narrative:     The following orders were created for panel order [place] Draw.  Procedure                               Abnormality         Status                     ---------                               -----------         ------                     Green Top (Gel)[[id number]]                                  Final result               Lavender Top[[id number]]                                     Final result               Gold Top - SST[[id number]]                                   Final result               Rivera Top[[id number]]                                         Final result               Light Blue Top[[id number]]                                   Final result                 Please view results for these tests on the individual orders.   COMPREHENSIVE METABOLIC PANEL    Narrative:     GFR Normal >60  Chronic Kidney Disease <60  Kidney Failure <15     CBC AND DIFFERENTIAL    Narrative:     The following orders were created for panel order CBC & Differential.  Procedure                               Abnormality         Status                     ---------                               -----------         ------                     CBC Auto Differential[[id number]]        Abnormal            Final result                 Please view results for these tests on the individual orders.   GREEN TOP   LAVENDER TOP   GOLD TOP - SST   GRAY TOP   LIGHT BLUE TOP       Meds Given in ED:   Medications   Sodium Chloride (PF) 0.9 % 10 mL (has no administration in time range)   cefTRIAXone (ROCEPHIN) 2,000 mg in sodium chloride 0.9 % 100 mL MBP (has no administration in time range)   metroNIDAZOLE (FLAGYL) tablet 500 mg (has no  administration in time range)   iopamidol (ISOVUE-300) 61 % injection 85 mL (85 mL Intravenous Given 10/1/24 0036)           Last NIH score:                                                          Dysphagia screening results:        [place] Coma Scale:  No data recorded     CIWA:        Restraint Type:            Isolation Status:  No active isolations

## 2024-10-01 NOTE — ED PROVIDER NOTES
EMERGENCY DEPARTMENT ENCOUNTER    Pt Name: Louie Levy  MRN: [id number]  Pt :   1975  Room Number:  S501/1  Date of encounter:  2024  PCP: Trina Feldman DO  ED Provider: CHRISTA Reinoso    Historian: Patient    HPI:  Chief Complaint: Right Lower Quadrant Abdominal Pain    Context: Louie Levy is a 49 y.o. male who presents to the ED c/o right lower quadrant abdominal pain. Patient reports that he started to experience chills, nausea, vomiting and pain in his right lower quadrant this evening over the last two hours. He reports normal bowel movement today. No urinary complaints. No prior abdominal surgical history.   HPI     REVIEW OF SYSTEMS  A chief complaint appropriate review of systems was completed and is negative except as noted in the HPI.     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    Hyperlipidemia 2023    Mood disorder     Seasonal allergies     Stroke     Tinnitus of both ears 2020       PAST SURGICAL HISTORY  Past Surgical History:   Procedure Laterality Date    CLAVICLE SURGERY Left 2015    NASAL SEPTAL RECONSTRUCTION  1992    WISDOM TOOTH EXTRACTION         FAMILY HISTORY  Family History   Problem Relation Age of Onset    Heart failure Mother     Heart disease Father     Skin cancer Father     Heart failure Father     No Known Problems Sister     Heart disease Maternal Grandmother     Heart failure Paternal Grandfather     No Known Problems Sister     No Known Problems Sister        SOCIAL HISTORY  Social History     Socioeconomic History    Marital status:      Spouse name: Annetta    Number of children: 1    Years of education: College    Highest education level: Bachelor's degree (e.g., BA, AB, BS)   Tobacco Use    Smoking status: Never     Passive exposure: Never     Smokeless tobacco: Never   Vaping Use    Vaping status: Never Used   Substance and Sexual Activity    Alcohol use: Not Currently     Alcohol/week: 2.0 standard drinks of alcohol     Types: 1 Cans of beer, 1 Shots of liquor per week    Drug use: Never    Sexual activity: Yes     Partners: Female       ALLERGIES  Patient has no known allergies.    PHYSICAL EXAM  Physical Exam  Vitals and nursing note reviewed.   Constitutional:       General: He is not in acute distress.     Appearance: Normal appearance. He is not ill-appearing.   HENT:      Head: Normocephalic and atraumatic.      Nose: Nose normal.      Mouth/Throat:      Mouth: Mucous membranes are moist.   Eyes:      Extraocular Movements: Extraocular movements intact.   Cardiovascular:      Rate and Rhythm: Normal rate and regular rhythm.   Pulmonary:      Effort: Pulmonary effort is normal.   Abdominal:      General: Bowel sounds are normal. There is no distension.      Palpations: Abdomen is soft.      Tenderness: There is abdominal tenderness in the right lower quadrant. There is guarding. There is no rebound.   Musculoskeletal:         General: Normal range of motion.      Cervical back: Normal range of motion and neck supple.   Skin:     General: Skin is warm and dry.   Neurological:      General: No focal deficit present.      Mental Status: He is alert.   Psychiatric:         Mood and Affect: Mood normal.         Behavior: Behavior normal.       LAB RESULTS  Results for orders placed or performed during the hospital encounter of 09/30/24   Comprehensive Metabolic Panel    Specimen: Blood   Result Value Ref Range    Glucose 91 65 - 99 mg/dL    BUN 12 6 - 20 mg/dL    Creatinine 0.83 0.76 - 1.27 mg/dL    Sodium 143 136 - 145 mmol/L    Potassium 4.0 3.5 - 5.2 mmol/L    Chloride 104 98 - 107 mmol/L    CO2 27.0 22.0 - 29.0 mmol/L    Calcium 9.4 8.6 - 10.5 mg/dL    Total Protein 7.3 6.0 - 8.5 g/dL    Albumin 4.5 3.5 - 5.2 g/dL    ALT (SGPT) 31 1 - 41 U/L     AST (SGOT) 28 1 - 40 U/L    Alkaline Phosphatase 66 39 - 117 U/L    Total Bilirubin 0.4 0.0 - 1.2 mg/dL    Globulin 2.8 gm/dL    A/G Ratio 1.6 g/dL    BUN/Creatinine Ratio 14.5 7.0 - 25.0    Anion Gap 12.0 5.0 - 15.0 mmol/L    eGFR 108.0 >60.0 mL/min/1.73   Lipase    Specimen: Blood   Result Value Ref Range    Lipase 65 (H) 13 - 60 U/L   Urinalysis With Microscopic If Indicated (No Culture) - Urine, Clean Catch    Specimen: Urine, Clean Catch   Result Value Ref Range    Color, UA Yellow Yellow, Straw    Appearance, UA Clear Clear    pH, UA 7.0 5.0 - 8.0    Specific Gravity, UA 1.016 1.001 - 1.030    Glucose, UA Negative Negative    Ketones, UA Negative Negative    Bilirubin, UA Negative Negative    Blood, UA Negative Negative    Protein, UA Negative Negative    Leuk Esterase, UA Negative Negative    Nitrite, UA Negative Negative    Urobilinogen, UA 0.2 E.U./dL 0.2 - 1.0 E.U./dL   CBC Auto Differential    Specimen: Blood   Result Value Ref Range    WBC 12.37 (H) 3.40 - 10.80 10*3/mm3    RBC 5.08 4.14 - 5.80 10*6/mm3    Hemoglobin 15.7 13.0 - 17.7 g/dL    Hematocrit 45.5 37.5 - 51.0 %    MCV 89.6 79.0 - 97.0 fL    MCH 30.9 26.6 - 33.0 pg    MCHC 34.5 31.5 - 35.7 g/dL    RDW 12.8 12.3 - 15.4 %    RDW-SD 41.9 37.0 - 54.0 fl    MPV 9.8 6.0 - 12.0 fL    Platelets 235 140 - 450 10*3/mm3    Neutrophil % 76.2 (H) 42.7 - 76.0 %    Lymphocyte % 15.0 (L) 19.6 - 45.3 %    Monocyte % 7.0 5.0 - 12.0 %    Eosinophil % 1.0 0.3 - 6.2 %    Basophil % 0.6 0.0 - 1.5 %    Immature Grans % 0.2 0.0 - 0.5 %    Neutrophils, Absolute 9.42 (H) 1.70 - 7.00 10*3/mm3    Lymphocytes, Absolute 1.86 0.70 - 3.10 10*3/mm3    Monocytes, Absolute 0.87 0.10 - 0.90 10*3/mm3    Eosinophils, Absolute 0.12 0.00 - 0.40 10*3/mm3    Basophils, Absolute 0.07 0.00 - 0.20 10*3/mm3    Immature Grans, Absolute 0.03 0.00 - 0.05 10*3/mm3    nRBC 0.0 0.0 - 0.2 /100 WBC   Lactic Acid, Plasma    Specimen: Blood   Result Value Ref Range    Lactate 1.5 0.5 - 2.0 mmol/L    ECG 12 Lead QT Measurement   Result Value Ref Range    QT Interval 352 ms    QTC Interval 425 ms   Green Top (Gel)   Result Value Ref Range    Extra Tube Hold for add-ons.    Lavender Top   Result Value Ref Range    Extra Tube hold for add-on    Gold Top - SST   Result Value Ref Range    Extra Tube Hold for add-ons.    Gray Top   Result Value Ref Range    Extra Tube Hold for add-ons.    Light Blue Top   Result Value Ref Range    Extra Tube Hold for add-ons.        If labs were ordered, I independently reviewed the results and considered them in treating the patient.    RADIOLOGY  CT Abdomen Pelvis With Contrast   Final Result   Impression:   1.There is dilatation of the appendiceal tip up to 12 mm and there is appendiceal mucosal hyperenhancement and a small appendicolith. Appearance may represent early appendicitis.   2.Hepatic steatosis.   3.Chronic bilateral L5 spondylolysis without spondylolisthesis.            Electronically Signed: Jovany Cruz MD     10/1/2024 1:25 AM EDT     Workstation ID: [id number]        [x] Radiologist's Report Reviewed:  I ordered and independently interpreted the above noted radiographic studies.  See radiologist's dictation for official interpretation.      PROCEDURES    Procedures    ECG 12 Lead QT Measurement   Preliminary Result   Test Reason : QT Measurement   Blood Pressure :   */*   mmHG   Vent. Rate :  88 BPM     Atrial Rate :  88 BPM      P-R Int : 150 ms          QRS Dur :  78 ms       QT Int : 352 ms       P-R-T Axes :  38   1  10 degrees      QTc Int : 425 ms      Normal sinus rhythm   Normal ECG   When compared with ECG of 17-SEP-2023 10:36,   No significant change was found      Referred By:            Confirmed By:           MEDICATIONS GIVEN IN ER    Medications   Sodium Chloride (PF) 0.9 % 10 mL (has no administration in time range)   Lidocaine 4 % 1 patch (has no administration in time range)   sodium chloride 0.9 % flush 10 mL (has no administration in time range)    sodium chloride 0.9 % flush 10 mL (has no administration in time range)   sodium chloride 0.9 % infusion 40 mL (has no administration in time range)   Potassium Replacement - Follow Nurse / BPA Driven Protocol (has no administration in time range)   Magnesium Standard Dose Replacement - Follow Nurse / BPA Driven Protocol (has no administration in time range)   Phosphorus Replacement - Follow Nurse / BPA Driven Protocol (has no administration in time range)   Calcium Replacement - Follow Nurse / BPA Driven Protocol (has no administration in time range)   acetaminophen (TYLENOL) tablet 650 mg (has no administration in time range)     Or   acetaminophen (TYLENOL) 160 MG/5ML oral solution 650 mg (has no administration in time range)     Or   acetaminophen (TYLENOL) suppository 650 mg (has no administration in time range)   sennosides-docusate (PERICOLACE) 8.6-50 MG per tablet 2 tablet (has no administration in time range)     And   polyethylene glycol (MIRALAX) packet 17 g (has no administration in time range)     And   bisacodyl (DULCOLAX) EC tablet 5 mg (has no administration in time range)     And   bisacodyl (DULCOLAX) suppository 10 mg (has no administration in time range)   melatonin tablet 5 mg (has no administration in time range)   ondansetron ODT (ZOFRAN-ODT) disintegrating tablet 4 mg (has no administration in time range)     Or   ondansetron (ZOFRAN) injection 4 mg (has no administration in time range)   metroNIDAZOLE (FLAGYL) tablet 500 mg (has no administration in time range)   cefTRIAXone (ROCEPHIN) 2,000 mg in sodium chloride 0.9 % 100 mL MBP (has no administration in time range)   iopamidol (ISOVUE-300) 61 % injection 85 mL (85 mL Intravenous Given 10/1/24 0036)   cefTRIAXone (ROCEPHIN) 2,000 mg in sodium chloride 0.9 % 100 mL MBP (2,000 mg Intravenous New Bag 10/1/24 8349)   metroNIDAZOLE (FLAGYL) tablet 500 mg (500 mg Oral Given 10/1/24 0320)       MEDICAL DECISION MAKING, PROGRESS, and CONSULTS    Medical Decision Making  49-year-old nontoxic-appearing male presents ED for evaluation of right lower quadrant pain, chills, nausea and vomiting.  Right lower quadrant tenderness with guarding and no rebound on physical exam.  Vital signs stable; mild leukocytosis 12.37; pain controlled in ER.  CT scan with evidence of acute appendicitis.  Per recommendation from general surgery initiation of ceftriaxone and Flagyl. Past medical history includes traumatic brain injury, history of stroke, hyperlipidemia; patient NPO and [place] will see in the morning.  Admitted to hospital medicine for further evaluation and treatment    Problems Addressed:  Acute appendicitis, unspecified acute appendicitis type: complicated acute illness or injury    Amount and/or Complexity of Data Reviewed  Independent Historian: spouse  External Data Reviewed: notes.     Details: I personally reviewed primary care visits for patient demonstrating history of stroke as well as traumatic brain injury  Labs: ordered. Decision-making details documented in ED Course.  Radiology: ordered and independent interpretation performed. Decision-making details documented in ED Course.    Risk  Prescription drug management.  Decision regarding hospitalization.      Discussion below represents my analysis of pertinent findings related to patient's condition, differential diagnosis, treatment plan and final disposition.    Differential diagnosis:  The differential diagnosis associated with the patient's presentation includes: Dyspepsia, viral gastroenteritis, constipation, medication side effects, psychiatric illness, irritable bowel syndrome, abdominal wall pain, gallbladder disease, pancreatitis, diverticulitis, appendicitis, kidney stone, UTI, hernia, gastroparesis, food poisoning, DKA, hepatitis, bowel obstruction, colitis and others.      Additional sources  Discussed/ obtained information from independent historians:   [x] Spouse  [] Parent  [] Family  member  [] Friend  [] EMS   [] Other:  External (non-ED) record review:   [] Inpatient record:   [] Office record:   [] Outpatient record:   [] Prior Outpatient labs:   [] Prior Outpatient radiology:   [] Primary Care record:   [] Outside ED record:   [] Other:   Patient's care impacted by:   [] Diabetes  [] Hypertension  [] Hyperlipidemia  [] Hypothyroidism   [] Coronary Artery Disease   [] COPD   [] Cancer   [] Obesity  [] GERD   [] Tobacco Abuse   [] Substance Abuse    [] Anxiety   [] Depression   [x] Other: History of stroke, traumatic brain injury  Care significantly affected by Social Determinants of Health (housing and economic circumstances, unemployment)    [] Yes     [x] No   If yes, Patient's care significantly limited by  Social Determinants of Health including:   [] Inadequate housing   [] Low income   [] Alcoholism and drug addiction in family   [] Problems related to primary support group   [] Unemployment   [] Problems related to employment   [] Other Social Determinants of Health:   Shared decision making: I reviewed workup performed in ED including labs and imaging. Based on findings, recommendation made for admission. Patient is agreeable to plan of care and hospital admission.      Orders placed during this visit:  Orders Placed This Encounter   Procedures    Blood Culture - Blood,    Blood Culture - Blood,    CT Abdomen Pelvis With Contrast    Railroad Draw    Comprehensive Metabolic Panel    Lipase    Urinalysis With Microscopic If Indicated (No Culture) - Urine, Clean Catch    CBC Auto Differential    Lactic Acid, Plasma    Comprehensive Metabolic Panel    Protime-INR    Manual Differential    CBC Auto Differential    NPO Diet NPO Type: Strict NPO    Undress & Gown    Vital Signs    Notify Provider (Specify Parameters)    Intake & Output    Weigh Patient    Oral Care    Place Sequential Compression Device    Maintain Sequential Compression Device    Maintain IV Access    Telemetry - Place  Orders & Notify Provider of Results When Patient Experiences Acute Chest Pain, Dysrhythmia or Respiratory Distress    May Be Off Telemetry for Tests    Continuous Pulse Oximetry    Up With Assistance    Code Status and Medical Interventions: CPR (Attempt to Resuscitate); Full Support    Inpatient General Surgery Consult    Incentive Spirometry    ECG 12 Lead QT Measurement    Insert Peripheral IV    Insert Peripheral IV    Initiate Observation Status    Fall Precautions    CBC & Differential    Green Top (Gel)    Lavender Top    Gold Top - SST    Gray Top    Light Blue Top    CBC & Differential     ED Course:    ED Course as of 10/01/24 0501 Tue Oct 01, 2024   0134 Vitals and Telemetry tracing was reviewed and directly interpreted by myself demonstrating blood pressure 145/95, afebrile, heart rate of 77, respirations 17 breaths/min and oxygen saturation 97% on room air [JG]   0134 BP: 145/95 [JG]   0134 Temp: 98.7 °F (37.1 °C) [JG]   0134 Heart Rate: 77 [JG]   0134 Resp: 17 [JG]   0134 SpO2: 97 % [JG]   0138 General Surgery paged [JG]   0143 Case reviewed with Dr. Cassidy with general surgery with recommendation made for n.p.o., initiation of Rocephin and Flagyl and admission to hospital medicine. [JG]   0458 Labs studies were reviewed and directly interpreted by myself and demonstrated no acute or emergent abnormalities; mild leukocytosis WBC 12.37. [JG]   0458 CT abdomen/pelvis personally interpreted by myself and with official read provided by radiology demonstrates dilatation of the appendiceal tip up to 12 mm and there is appendiceal mucosal hyperenhancement and a small appendicolith; findings concerning for acute appendicitis.  [JG]      ED Course User Index  [JG] Yoan Chavez PA          DIAGNOSIS  Final diagnoses:   Acute appendicitis, unspecified acute appendicitis type   Right lower quadrant abdominal pain   Nausea and vomiting, unspecified vomiting type   History of stroke   History of traumatic  brain injury   History of hyperlipidemia     DISPOSITION    ED Disposition       ED Disposition   Decision to Admit    Condition   --    Comment   Level of Care: Telemetry [5]   Diagnosis: Appendicitis [220910]   Admitting Physician: LEANDRO BOWEN [[id number]]   Attending Physician: LEANDRO BOWEN [[id number]]                 Please note that portions of this document were completed with voice recognition software.        Yoan Chavez PA  10/01/24 0502

## 2024-10-01 NOTE — OUTREACH NOTE
Prep Survey      Flowsheet Row Responses   [facility] patient discharged from? [place]   Is LACE score < 7 ? Yes   Eligibility [facility]   Date of Admission 09/30/24   Date of Discharge 10/01/24   Discharge Disposition Home or Self Care   Discharge diagnosis *Appendicitis ( lap appy   Does the patient have one of the following disease processes/diagnoses(primary or secondary)? General Surgery   Does the patient have Home health ordered? No   Is there a DME ordered? No   Prep survey completed? Yes            SHMUEL VAUGHN - Registered Nurse

## 2024-10-01 NOTE — ANESTHESIA POSTPROCEDURE EVALUATION
Patient: Louie Levy    Procedure Summary       Date: 10/01/24 Room / Location:  [facility] OR 04 /  [facility]    Anesthesia Start: 0711 Anesthesia Stop: 0815    Procedure: APPENDECTOMY LAPAROSCOPIC (Abdomen) Diagnosis:     Surgeons: Chandler Cassidy MD Provider: Audie Alva MD    Anesthesia Type: general ASA Status: 3            Anesthesia Type: general    Vitals  Vitals Value Taken Time   /73 10/01/24 0815   Temp 97.6 °F (36.4 °C) 10/01/24 0815   Pulse 92 10/01/24 0815   Resp 14 10/01/24 0815   SpO2 95 % 10/01/24 0815           Post Anesthesia Care and Evaluation    Patient location during evaluation: PACU  Patient participation: complete - patient participated  Level of consciousness: sleepy but conscious  Pain score: 0  Pain management: adequate    Airway patency: patent  Anesthetic complications: No anesthetic complications  PONV Status: none  Cardiovascular status: hemodynamically stable and acceptable  Respiratory status: nonlabored ventilation, acceptable, nasal cannula and oral airway  Hydration status: acceptable    Comments: Pt arrived to PACU with no issues during transport. Pt placed directly to monitors. Vital signs are within parameters. Pt maintaining ventilation spontaneously. No changes to dental. Report given to PACU and all question and concerns were addressed as well as the plan of care.

## 2024-10-01 NOTE — CONSULTS
General Surgery Consultation Note    Date of Service: 10/1/2024  Louie Levy  [id number]  1975      Referring Provider: Adenike Teran MD    Location of Consult: Inpatient     Reason for Consultation: Acute appendicitis       History of Present Illness:  I am seeing, Louie Levy, in consultation for Adenike Teran MD regarding acute appendicitis.  49-year-old young gentleman presented to the emergency room with the acute onset of abdominal pain beginning last evening.  The pain has worsened and is 10 out of 10 in intensity.  He has had no pain like this before.  He had a bowel movement last evening without any relief of pain or discomfort.  He has had no fever nor chills.  He did have some nausea though no vomiting.  Otherwise there are no significant modifying factors or associated symptoms.  Workup in the emergency room was consistent with an appendicitis.    Problems Addressed this Visit          Gastrointestinal Abdominal     * (Principal) Appendicitis - Primary     Other Visit Diagnoses       Right lower quadrant abdominal pain        Nausea and vomiting, unspecified vomiting type        History of stroke        History of traumatic brain injury        History of hyperlipidemia              Diagnoses         Codes Comments    Acute appendicitis, unspecified acute appendicitis type    -  Primary ICD-10-CM: K35.80  ICD-9-CM: 540.9     Right lower quadrant abdominal pain     ICD-10-CM: R10.31  ICD-9-CM: 789.03     Nausea and vomiting, unspecified vomiting type     ICD-10-CM: R11.2  ICD-9-CM: 787.01     History of stroke     ICD-10-CM: Z86.73  ICD-9-CM: V12.54     History of traumatic brain injury     ICD-10-CM: Z87.820  ICD-9-CM: V15.52     History of hyperlipidemia     ICD-10-CM: Z86.39  ICD-9-CM: V12.29             Past Medical History:   Diagnosis Date    ADHD (attention deficit hyperactivity disorder)     Closed fracture of left clavicle     History of medical problems 04/03/2023    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     Current Outpatient Medications on File Prior to Encounter   Medication Sig Dispense Refill    aspirin 81 MG EC tablet Take 1 tablet by mouth Daily.      [DISCONTINUED] atorvastatin (LIPITOR) 80 MG tablet Take 1 tablet by mouth Every Night. (Patient not taking: Reported on 9/21/2023) 90 tablet 2    [DISCONTINUED] brompheniramine-pseudoephedrine-DM 30-2-10 MG/5ML syrup Take 10 mL by mouth 4 (Four) Times a Day As Needed for Congestion or Cough. 118 mL 0    [DISCONTINUED] cefdinir (OMNICEF) 300 MG capsule Take 1 capsule by mouth 2 (Two) Times a Day. 20 capsule 0    [DISCONTINUED] ibuprofen (ADVIL,MOTRIN) 600 MG tablet Take 1 tablet by mouth Every 6 (Six) Hours As Needed for Mild Pain. 20 tablet 0    [DISCONTINUED] lidocaine (LIDODERM) 5 % Place 1 patch on the skin as directed by provider Daily. Remove & Discard patch within 12 hours or as directed by MD 6 patch 0    [DISCONTINUED] ofloxacin (FLOXIN) 0.3 % otic solution Administer 5 drops into the left ear Daily. 5 mL 0         Current Facility-Administered Medications:     acetaminophen (TYLENOL) tablet 650 mg, 650 mg, Oral, Q4H PRN **OR** acetaminophen (TYLENOL) 160 MG/5ML oral solution 650 mg, 650 mg, Oral, Q4H PRN **OR** acetaminophen (TYLENOL) suppository 650 mg, 650 mg, Rectal, Q4H PRN, Brigette Piedra APRN    sennosides-docusate (PERICOLACE) 8.6-50 MG per tablet 2 tablet, 2 tablet, Oral, BID PRN **AND** polyethylene glycol (MIRALAX) packet 17 g, 17 g, Oral, Daily PRN **AND** bisacodyl (DULCOLAX) EC tablet 5 mg, 5 mg, Oral, Daily PRN **AND** bisacodyl (DULCOLAX) suppository 10 mg, 10 mg, Rectal, Daily PRN, Brigette Piedra,  SAKINA    Calcium Replacement - Follow Nurse / BPA Driven Protocol, , Does not apply, PRN, Brigette Piedra APRN    cefTRIAXone (ROCEPHIN) 2,000 mg in sodium chloride 0.9 % 100 mL MBP, 2,000 mg, Intravenous, Q24H, Brigette Piedra APRN    lactated ringers infusion, 9 mL/hr, Intravenous, Continuous PRN, Audie Alva MD, Last Rate: 9 mL/hr at 10/01/24 0632, 9 mL/hr at 10/01/24 0632    Lidocaine 4 % 1 patch, 1 patch, Transdermal, Q24H, Brigette Piedra APRN    Magnesium Standard Dose Replacement - Follow Nurse / BPA Driven Protocol, , Does not apply, PRN, Brigette Piedra APRN    melatonin tablet 5 mg, 5 mg, Oral, Nightly PRN, Brigette Piedra APRN    metroNIDAZOLE (FLAGYL) tablet 500 mg, 500 mg, Oral, Q8H, Brigette Piedra APRN    ondansetron ODT (ZOFRAN-ODT) disintegrating tablet 4 mg, 4 mg, Oral, Q6H PRN **OR** ondansetron (ZOFRAN) injection 4 mg, 4 mg, Intravenous, Q6H PRN, Brigette Piedra APRN    Phosphorus Replacement - Follow Nurse / BPA Driven Protocol, , Does not apply, PRN, Brigette Piedra APRN    Potassium Replacement - Follow Nurse / BPA Driven Protocol, , Does not apply, PRN, Birgette Piedra APRN    [Transfer Hold] Sodium Chloride (PF) 0.9 % 10 mL, 10 mL, Intravenous, PRN, Nomi Holland MD    sodium chloride 0.9 % flush 10 mL, 10 mL, Intravenous, Q12H, Brigette Piedra APRN    sodium chloride 0.9 % flush 10 mL, 10 mL, Intravenous, PRN, Brigette Piedra APRN    sodium chloride 0.9 % flush 10 mL, 10 mL, Intravenous, Q12H, Audie Alva MD    sodium chloride 0.9 % flush 10 mL, 10 mL, Intravenous, PRN, Audie Alva MD, 10 mL at 10/01/24 0632    sodium chloride 0.9 % infusion 40 mL, 40 mL, Intravenous, PRN, Brigette Piedra APRN    sodium chloride 0.9 % infusion 40 mL, 40 mL, Intravenous, PRN, Audie Alva MD    Family History   Problem Relation Age of Onset    Heart failure Mother     Heart disease Father     Skin cancer Father     Heart failure Father     No Known Problems Sister     Heart disease Maternal Grandmother     Heart failure  "Paternal Grandfather     No Known Problems Sister     No Known Problems Sister      Social History     Socioeconomic History    Marital status:      Spouse name: Annetta    Number of children: 1    Years of education: College    Highest education level: Bachelor's degree (e.g., BA, AB, BS)   Tobacco Use    Smoking status: Never     Passive exposure: Never    Smokeless tobacco: Never   Vaping Use    Vaping status: Never Used   Substance and Sexual Activity    Alcohol use: Not Currently     Alcohol/week: 2.0 standard drinks of alcohol     Types: 1 Cans of beer, 1 Shots of liquor per week    Drug use: Never    Sexual activity: Yes     Partners: Female       Review of Systems:  Review of Systems   Constitutional:  Positive for appetite change. Negative for fever.   HENT:  Negative for congestion, ear discharge and mouth sores.    Eyes:  Negative for photophobia and visual disturbance.   Respiratory:  Negative for apnea, cough and stridor.    Cardiovascular:  Negative for chest pain and leg swelling.   Gastrointestinal:  Positive for abdominal pain and nausea. Negative for blood in stool and vomiting.   Endocrine: Negative for polyphagia and polyuria.   Genitourinary:  Negative for difficulty urinating, flank pain and urgency.   Musculoskeletal:  Negative for arthralgias and joint swelling.   Skin:  Negative for pallor and rash.   Allergic/Immunologic: Negative for immunocompromised state.   Neurological:  Negative for dizziness, weakness and numbness.   Hematological:  Negative for adenopathy.   Psychiatric/Behavioral:  Negative for agitation, hallucinations and sleep disturbance.      Otherwise the 12 point review of systems is negative.    /79 (BP Location: Right arm, Patient Position: Lying)   Pulse 77   Temp 98.4 °F (36.9 °C) (Temporal)   Resp 16   Ht 177.8 cm (70\")   Wt 109 kg (240 lb 9.6 oz)   SpO2 94%   BMI 34.52 kg/m²   Body mass index is 34.52 kg/m².    General: Mild distress, pleasantly " conversant  HEENT: PER, no icterus, normal sclerae  Cardiac: regular rhythm,  no audible rubs  Pulmonary: bilateral breath sounds, nonlabored  Abdominal: Localized peritonitis in the right lower quadrant, no palpable hepatosplenomegaly  Neurologic: awake, alert, no obvious focal deficits  Extremities: warm, no edema  Skin: no obvious rashes nor worrisome lesions seen     CBC  Results from last 7 days   Lab Units 10/01/24  0415   WBC 10*3/mm3 10.39   HEMOGLOBIN g/dL 14.6   HEMATOCRIT % 42.3   PLATELETS 10*3/mm3 222       CMP  Results from last 7 days   Lab Units 10/01/24  0415   SODIUM mmol/L 138   POTASSIUM mmol/L 3.9   CHLORIDE mmol/L 103   CO2 mmol/L 24.0   BUN mg/dL 12   CREATININE mg/dL 0.74*   CALCIUM mg/dL 8.8   BILIRUBIN mg/dL 0.3   ALK PHOS U/L 53   ALT (SGPT) U/L 26   AST (SGOT) U/L 24   GLUCOSE mg/dL 104*       Radiology  Imaging Results (Last 72 Hours)       Procedure Component Value Units Date/Time    CT Abdomen Pelvis With Contrast [[id number]] Collected: 10/01/24 0122     Updated: 10/01/24 0129    Narrative:      CT ABDOMEN PELVIS W CONTRAST    Date of Exam: 10/1/2024 12:21 AM EDT    Indication: Right lower quadrant abdominal pain.    Comparison: None available.    Technique: Axial CT images were obtained of the abdomen and pelvis following the uneventful intravenous administration of 85 mL Isovue-300. Reconstructed coronal and sagittal images were also obtained. Automated exposure control and iterative   construction methods were used.      Findings:  Heart size is normal. Distal esophagus is unremarkable. There is mild dependent bibasilar atelectasis. There are no acute findings in the superficial soft tissues. No acute osseous abnormality or destructive bone lesion. There is chronic bilateral   nondisplaced L5 spondylolysis without spondylolisthesis. There are otherwise mild degenerative changes in the lumbar spine.    There is low-attenuation throughout the liver suggesting steatosis. The  gallbladder is contracted. The bile ducts, pancreas, stomach, duodenum, spleen and adrenal glands appear normal. Kidney parenchyma enhances homogeneously. There is no urolithiasis or   hydronephrosis. Prostate gland is normal size. There is a small central prostatic calcification, likely incidental. The appendix demonstrates mucosal hyperenhancement and there is mild dilatation of the appendiceal tip measuring up to 12 mm. There is a   small appendicolith in the distal appendix. There is no obvious inflammatory change adjacent to the appendix and there is no fluid collection or extraluminal gas. The colon is unremarkable. The small bowel is nondistended. The abdominal and pelvic   vasculature appear within normal limits. No ascites, pneumoperitoneum or lymphadenopathy.      Impression:      Impression:  1.There is dilatation of the appendiceal tip up to 12 mm and there is appendiceal mucosal hyperenhancement and a small appendicolith. Appearance may represent early appendicitis.  2.Hepatic steatosis.  3.Chronic bilateral L5 spondylolysis without spondylolisthesis.        Electronically Signed: Jovany Cruz MD    10/1/2024 1:25 AM EDT    Workstation ID: [id number]          Assessment:  Acute appendicitis    Plan:  N.p.o., IV volume resuscitation, IV antibiotics, and appendectomy.  I personally reviewed his labs, and imaging.  The risks and benefits of appendectomy were discussed with the patient. Our discussion included, but was not limited to: bleeding, infection, injury to adjacent viscera (colon, small bowel, right ureter etc.), chronic pain, cecal/appendiceal stump leak, intra-abdominal abscess formation, need for percutaneous drainage, need for an open operation, and medical issues from a cardiopulmonary and deep venous thrombosis standpoint.  He understands and wishes to proceed with surgical intervention.     Chandler Cassidy MD  10/01/24  07:01 EDT

## 2024-10-01 NOTE — CASE MANAGEMENT/SOCIAL WORK
Continued Stay Note  [facility]     Patient Name: Louie Levy  MRN: [id number]  Today's Date: 10/1/2024    Admit Date: 9/30/2024    Plan: Home at DC   Discharge Plan       Row Name 10/01/24 1122       Plan    Plan Home at DC    Plan Comments No DC needs identified at this time.    Final Discharge Disposition Code 01 - home or self-care                   Discharge Codes    No documentation.                       Tamar Perez RN

## 2024-10-01 NOTE — H&P
[facility] Medicine Services  HISTORY AND PHYSICAL    Patient Name: Louie Levy  : 1975  MRN: [id number]  Primary Care Physician: Trina Feldman DO  Date of admission: 2024    Subjective   Subjective     Chief Complaint:  Right lower quadrant pain    HPI:  Louie Levy is a 49 y.o. male with significant history of hyperlipidemia, stroke, traumatic brain injury presents to [facility] ED with complaints of severe pain in his right lower quadrant that began at approximately 8:30 this evening.  He reports pain was sudden onset, describes it as intense and rhythmic.  He states he has never had this pain before and it does not radiate anywhere.  He denies headaches lightheadedness, has dizziness at baseline with no significant changes.  Reports nausea at the time of onset.  Denies fevers but reports having chills during severe pain.     He appears to be resting comfortably in bed.  Now rates his pain 3 out of 10.    In ED, CT shows acute appendicitis.  WBC 12.37.  Lactate 1.5.  ED discussed with Dr. Cassidy with general surgery who recommended starting Rocephin and Flagyl.        Review of Systems   Constitutional:  Positive for chills. Negative for fever.   HENT: Negative.     Eyes: Negative.    Respiratory:  Positive for chest tightness. Negative for shortness of breath.    Cardiovascular:  Negative for chest pain and leg swelling.   Gastrointestinal:  Positive for abdominal pain and nausea.   Endocrine: Negative.    Genitourinary:  Negative for difficulty urinating.   Musculoskeletal:  Negative for myalgias.   Skin: Negative.    Allergic/Immunologic: Negative.    Neurological:  Positive for dizziness (Baseline, unchanged). Negative for light-headedness and headaches.   Hematological: Negative.    Psychiatric/Behavioral: Negative.                  Personal History     Past Medical History:   Diagnosis Date    ADHD (attention deficit hyperactivity disorder)      Closed fracture of left clavicle     History of medical problems 04/03/2023    Cavernous malformation at brainstem    Hyperlipidemia 04/03/2023    Mood disorder     Seasonal allergies     Stroke     Tinnitus of both ears 09/08/2020             Past Surgical History:   Procedure Laterality Date    CLAVICLE SURGERY Left 2015    NASAL SEPTAL RECONSTRUCTION  1992    WISDOM TOOTH EXTRACTION  1990       Family History: family history includes Heart disease in his father and maternal grandmother; Heart failure in his father, mother, and paternal grandfather; No Known Problems in his sister, sister, and sister; Skin cancer in his father.     Social History:  reports that he has never smoked. He has never been exposed to tobacco smoke. He has never used smokeless tobacco. He reports that he does not currently use alcohol after a past usage of about 2.0 standard drinks of alcohol per week. He reports that he does not use drugs.  Social History     Social History Narrative    Not on file       Medications:  atorvastatin, brompheniramine-pseudoephedrine-DM, cefdinir, ibuprofen, lidocaine, and ofloxacin    No Known Allergies    Objective   Objective     Vital Signs:   Temp:  [98.7 °F (37.1 °C)] 98.7 °F (37.1 °C)  Heart Rate:  [77-92] 92  Resp:  [17-20] 20  BP: (129-145)/(70-95) 132/70    Physical Exam  Constitutional:       Appearance: Normal appearance.   HENT:      Head: Normocephalic.   Eyes:      Extraocular Movements: Extraocular movements intact.      Pupils: Pupils are equal, round, and reactive to light.   Cardiovascular:      Rate and Rhythm: Normal rate and regular rhythm.      Pulses: Normal pulses.      Heart sounds: Normal heart sounds.   Pulmonary:      Effort: Pulmonary effort is normal.      Breath sounds: Normal breath sounds.   Abdominal:      General: Bowel sounds are normal.      Palpations: Abdomen is soft.      Tenderness: There is no abdominal tenderness. There is no guarding.   Musculoskeletal:      Right  lower leg: No edema.      Left lower leg: No edema.   Skin:     General: Skin is warm and dry.   Neurological:      General: No focal deficit present.      Mental Status: He is alert and oriented to person, place, and time.   Psychiatric:         Mood and Affect: Mood normal.         Behavior: Behavior normal.          Result Review:  I have personally reviewed the results from the time of this admission to 10/1/2024 03:32 EDT and agree with these findings:  [x]  Laboratory list / accordion  []  Microbiology  [x]  Radiology  []  EKG/Telemetry   []  Cardiology/Vascular   []  Pathology  [x]  Old records  []  Other:      LAB RESULTS:      Lab 09/30/24  2357   WBC 12.37*   HEMOGLOBIN 15.7   HEMATOCRIT 45.5   PLATELETS 235   NEUTROS ABS 9.42*   IMMATURE GRANS (ABS) 0.03   LYMPHS ABS 1.86   MONOS ABS 0.87   EOS ABS 0.12   MCV 89.6   LACTATE 1.5         Lab 09/30/24  2357   SODIUM 143   POTASSIUM 4.0   CHLORIDE 104   CO2 27.0   ANION GAP 12.0   BUN 12   CREATININE 0.83   EGFR 108.0   GLUCOSE 91   CALCIUM 9.4         Lab 09/30/24  2357   TOTAL PROTEIN 7.3   ALBUMIN 4.5   GLOBULIN 2.8   ALT (SGPT) 31   AST (SGOT) 28   BILIRUBIN 0.4   ALK PHOS 66   LIPASE 65*                     Brief Urine Lab Results  (Last result in the past 365 days)        Color   Clarity   Blood   Leuk Est   Nitrite   Protein   CREAT   Urine HCG        09/30/24 2358 Yellow   Clear   Negative   Negative   Negative   Negative                 Microbiology Results (last 10 days)       ** No results found for the last 240 hours. **            CT Abdomen Pelvis With Contrast    Result Date: 10/1/2024  CT ABDOMEN PELVIS W CONTRAST Date of Exam: 10/1/2024 12:21 AM EDT Indication: Right lower quadrant abdominal pain. Comparison: None available. Technique: Axial CT images were obtained of the abdomen and pelvis following the uneventful intravenous administration of 85 mL Isovue-300. Reconstructed coronal and sagittal images were also obtained. Automated exposure  control and iterative construction methods were used. Findings: Heart size is normal. Distal esophagus is unremarkable. There is mild dependent bibasilar atelectasis. There are no acute findings in the superficial soft tissues. No acute osseous abnormality or destructive bone lesion. There is chronic bilateral nondisplaced L5 spondylolysis without spondylolisthesis. There are otherwise mild degenerative changes in the lumbar spine. There is low-attenuation throughout the liver suggesting steatosis. The gallbladder is contracted. The bile ducts, pancreas, stomach, duodenum, spleen and adrenal glands appear normal. Kidney parenchyma enhances homogeneously. There is no urolithiasis or  hydronephrosis. Prostate gland is normal size. There is a small central prostatic calcification, likely incidental. The appendix demonstrates mucosal hyperenhancement and there is mild dilatation of the appendiceal tip measuring up to 12 mm. There is a small appendicolith in the distal appendix. There is no obvious inflammatory change adjacent to the appendix and there is no fluid collection or extraluminal gas. The colon is unremarkable. The small bowel is nondistended. The abdominal and pelvic vasculature appear within normal limits. No ascites, pneumoperitoneum or lymphadenopathy.     Impression: Impression: 1.There is dilatation of the appendiceal tip up to 12 mm and there is appendiceal mucosal hyperenhancement and a small appendicolith. Appearance may represent early appendicitis. 2.Hepatic steatosis. 3.Chronic bilateral L5 spondylolysis without spondylolisthesis. Electronically Signed: Jovany Cruz MD  10/1/2024 1:25 AM EDT  Workstation ID: [id number]     Results for orders placed during the hospital encounter of 04/01/23    Adult Transthoracic Echo Complete W/ Cont if Necessary Per Protocol (With Agitated Saline)    Interpretation Summary    Left ventricular ejection fraction appears to be 61 - 65%.    Saline test results are  negative.      Assessment & Plan   Assessment & Plan       Appendicitis    History of ischemic stroke    TBI (traumatic brain injury)    Hyperlipidemia    Louie Levy is a 49 y.o. male with significant history of hyperlipidemia, stroke, traumatic brain injury presents to [facility] ED with complaints of severe pain in his right lower quadrant that began at approximately 8:30  on 9/30.  He was found to have acute appendicitis, and is admitted to hospital care for IV antibiotics and pain management in preparation for surgery.       Appendicitis  -CT of abdomen shows dilatation of the appendiceal tip up to 12 mm and there is appendiceal mucosal hyperenhancement and a small appendicolith. Appearance may represent early appendicitis.  Hepatic steatosis. Chronic bilateral L5 spondylolysis without spondylolisthesis.   -WBC 12.37  -Lactate 1.5  -Antiemetics  -Pain control  -Consult general surgery, [facility]  -Rocephin and Flagyl per GEN surge  -CBC and CMP in the a.m.  -N.p.o.      History of stroke  HLD  -Patient no longer taking statin        DVT prophylaxis:  SCDS    CODE STATUS: Full  Level Of Support Discussed With: Patient  Code Status (Patient has no pulse and is not breathing): CPR (Attempt to Resuscitate)  Medical Interventions (Patient has pulse or is breathing): Full Support      Expected Discharge  Expected discharge date/ time has not been documented. TBD      This note has been completed as part of a split-shared workflow.     Signature: Electronically signed by SAKINA Wong, 10/01/24, 3:32 AM EDT

## 2024-10-01 NOTE — ANESTHESIA PROCEDURE NOTES
Airway  Urgency: elective    Date/Time: 10/1/2024 7:18 AM  Airway not difficult    General Information and Staff    Patient location during procedure: OR  CRNA/CAA: Joaquin Ortiz CRNA    Indications and Patient Condition  Indications for airway management: airway protection    Preoxygenated: yes  MILS not maintained throughout  Mask difficulty assessment: 2 - vent by mask + OA or adjuvant +/- NMBA    Final Airway Details  Final airway type: endotracheal airway      Successful airway: ETT  Cuffed: yes   Successful intubation technique: video laryngoscopy  Endotracheal tube insertion site: oral  Blade: Shell  Blade size: 3  ETT size (mm): 7.0  Cormack-Lehane Classification: grade I - full view of glottis  Placement verified by: chest auscultation and capnometry   Cuff volume (mL): 10  Measured from: lips  ETT/EBT  to lips (cm): 22  Number of attempts at approach: 1  Assessment: lips, teeth, and gum same as pre-op and atraumatic intubation    Additional Comments  Negative epigastric sounds, Breath sound equal bilaterally with symmetric chest rise and fall

## 2024-10-02 ENCOUNTER — TRANSITIONAL CARE MANAGEMENT TELEPHONE ENCOUNTER (OUTPATIENT)
Dept: CALL CENTER | Facility: HOSPITAL | Age: 49
End: 2024-10-02
Payer: COMMERCIAL

## 2024-10-02 LAB
CYTO UR: NORMAL
LAB AP CASE REPORT: NORMAL
LAB AP CLINICAL INFORMATION: NORMAL
PATH REPORT.FINAL DX SPEC: NORMAL
PATH REPORT.GROSS SPEC: NORMAL
QT INTERVAL: 352 MS
QTC INTERVAL: 425 MS

## 2024-10-02 NOTE — OUTREACH NOTE
Call Center TCM Note      Flowsheet Row Responses   [facility] patient discharged from? [place]   Does the patient have one of the following disease processes/diagnoses(primary or secondary)? General Surgery   TCM attempt successful? Yes   Call start time 0928   Call end time 0930   Discharge diagnosis *Appendicitis ( lap appy   Person spoke with today (if not patient) and relationship wife   Meds reviewed with patient/caregiver? Yes   Is the patient having any side effects they believe may be caused by any medication additions or changes? No   Does the patient have all medications related to this admission filled (includes all antibiotics, pain medications, etc.) Yes   Is the patient taking all medications as directed (includes completed medication regime)? Yes   Comments HOSP DC FU appt 10/7/24 230 pm.   Does the patient have an appointment with their PCP within 7-14 days of discharge? Yes   Has home health visited the patient within 72 hours of discharge? N/A   Psychosocial issues? No   Did the patient receive a copy of their discharge instructions? Yes   Nursing interventions Reviewed instructions with patient   What is the patient's perception of their health status since discharge? Improving   Nursing interventions Nurse provided patient education   Is the patient /caregiver able to teach back basic post-op care? Lifting as instructed by MD in discharge instructions, No tub bath, swimming, or hot tub until instructed by MD, Take showers only when approved by MD-sponge bathe until then, Drive as instructed by MD in discharge instructions   Is the patient/caregiver able to teach back signs and symptoms of incisional infection? Increased redness, swelling or pain at the incisonal site, Increased drainage or bleeding, Incisional warmth, Pus or odor from incision, Fever   Is the patient/caregiver able to teach back steps to recovery at home? Eat a well-balance diet, Set small, achievable goals for return to  baseline health   Is the patient/caregiver able to teach back the hierarchy of who to call/visit for symptoms/problems? PCP, Specialist, Home health nurse, Urgent Care, ED, 911 Yes   TCM call completed? Yes   Wrap up additional comments Wife reports Pt is doing well. no needs.   Call end time 0930            Su Gar RN    10/2/2024, 09:30 EDT

## 2024-10-06 LAB
BACTERIA SPEC AEROBE CULT: NORMAL
BACTERIA SPEC AEROBE CULT: NORMAL

## 2024-10-07 ENCOUNTER — OFFICE VISIT (OUTPATIENT)
Dept: FAMILY MEDICINE CLINIC | Facility: CLINIC | Age: 49
End: 2024-10-07
Payer: COMMERCIAL

## 2024-10-07 VITALS
BODY MASS INDEX: 33.95 KG/M2 | DIASTOLIC BLOOD PRESSURE: 80 MMHG | RESPIRATION RATE: 21 BRPM | HEART RATE: 84 BPM | TEMPERATURE: 98.2 F | WEIGHT: 236.6 LBS | SYSTOLIC BLOOD PRESSURE: 122 MMHG | OXYGEN SATURATION: 99 %

## 2024-10-07 DIAGNOSIS — K35.80 ACUTE APPENDICITIS, UNSPECIFIED ACUTE APPENDICITIS TYPE: ICD-10-CM

## 2024-10-07 DIAGNOSIS — Z23 NEED FOR IMMUNIZATION AGAINST INFLUENZA: Primary | ICD-10-CM

## 2024-10-07 NOTE — PROGRESS NOTES
Chief Complaint  Hospital Follow Up Visit    History of Present Illness      The patient was discharged on 10/1 after being treated for appendicitis. He denies any sob, no leg swelling, no cough, no urine issues. No fever. He just does not feel full energy back since d/c. He denies any belly pain. No constipation or diarrhea. He denies any blood in the stool.     The following portions of the patient's history were reviewed and updated as appropriate: allergies, current medications, past family history, past medical history, past social history, past surgical history, and problem list.    OBJECTIVE:  /80 (BP Location: Left arm, Patient Position: Sitting, Cuff Size: Adult)   Pulse 84   Temp 98.2 °F (36.8 °C) (Infrared)   Resp 21   Wt 107 kg (236 lb 9.6 oz)   SpO2 99%   BMI 33.95 kg/m²       Physical Exam  Constitutional:       General: He is not in acute distress.     Appearance: Normal appearance.   HENT:      Head: Normocephalic and atraumatic.   Eyes:      Extraocular Movements: Extraocular movements intact.   Cardiovascular:      Rate and Rhythm: Normal rate and regular rhythm.      Heart sounds: No murmur heard.  Pulmonary:      Effort: Pulmonary effort is normal. No respiratory distress.      Breath sounds: Normal breath sounds. No stridor. No wheezing, rhonchi or rales.   Abdominal:      Palpations: Abdomen is soft.      Tenderness: There is no abdominal tenderness.      Comments: Surgical tape removed and scars look healed no infection   Skin:     Findings: No rash.   Neurological:      General: No focal deficit present.      Mental Status: He is alert.   Psychiatric:         Mood and Affect: Mood normal.                    Assessment and Plan   Diagnoses and all orders for this visit:    1. Need for immunization against influenza (Primary)  -     Fluzone >6mos ([id number])    2. Acute appendicitis, unspecified acute appendicitis type    Reviewed d c summary, cbc cmp blood culture pt inr ct scan  report      Return in about 6 months (around 4/7/2025).       Trina Feldman D.O.  [facility]

## 2024-10-07 NOTE — LETTER
October 7, 2024     Patient: Louie Levy   YOB: 1975   Date of Visit: 10/7/2024       To Whom It May Concern:    It is my medical opinion that Louie Levy should be excused from work 10/7/24-10/11/24.        Sincerely,        Trina Feldman DO    CC: No Recipients

## 2025-04-11 ENCOUNTER — OFFICE VISIT (OUTPATIENT)
Dept: FAMILY MEDICINE CLINIC | Facility: CLINIC | Age: 50
End: 2025-04-11
Payer: COMMERCIAL

## 2025-04-11 VITALS
RESPIRATION RATE: 17 BRPM | TEMPERATURE: 97.8 F | WEIGHT: 231.2 LBS | BODY MASS INDEX: 33.17 KG/M2 | HEART RATE: 79 BPM | OXYGEN SATURATION: 97 % | SYSTOLIC BLOOD PRESSURE: 130 MMHG | DIASTOLIC BLOOD PRESSURE: 90 MMHG

## 2025-04-11 DIAGNOSIS — Z00.00 ANNUAL PHYSICAL EXAM: Primary | ICD-10-CM

## 2025-04-11 PROCEDURE — 99396 PREV VISIT EST AGE 40-64: CPT | Performed by: STUDENT IN AN ORGANIZED HEALTH CARE EDUCATION/TRAINING PROGRAM

## 2025-04-11 NOTE — PROGRESS NOTES
Chief Complaint  Hyperlipidemia (F/u)    History of Present Illness      Annual exam  Colonoscopy up to phuong  Counseled on diet and exercise   Recommend dental and eye exam  hiv hep c sti screening: the patient declines.   a1c /lipid screening: patient agreeable.   Vaccines recommended:  covid vaccine  Tdap  hepatitis      Pt has no complaints today    The following portions of the patient's history were reviewed and updated as appropriate: allergies, current medications, past family history, past medical history, past social history, past surgical history, and problem list.    OBJECTIVE:  /90 (BP Location: Left arm, Patient Position: Sitting, Cuff Size: Adult)   Pulse 79   Temp 97.8 °F (36.6 °C) (Infrared)   Resp 17   Wt 105 kg (231 lb 3.2 oz)   SpO2 97%   BMI 33.17 kg/m²       Physical Exam  Constitutional:       General: He is not in acute distress.     Appearance: Normal appearance.   HENT:      Head: Normocephalic and atraumatic.   Eyes:      Extraocular Movements: Extraocular movements intact.   Cardiovascular:      Rate and Rhythm: Normal rate and regular rhythm.      Heart sounds: No murmur heard.  Pulmonary:      Effort: Pulmonary effort is normal. No respiratory distress.      Breath sounds: Normal breath sounds. No stridor. No wheezing, rhonchi or rales.   Skin:     Findings: No rash.   Neurological:      General: No focal deficit present.      Mental Status: He is alert.   Psychiatric:         Mood and Affect: Mood normal.                    Assessment and Plan   Diagnoses and all orders for this visit:    1. Annual physical exam (Primary)  -     CBC (No Diff); Future  -     Comprehensive Metabolic Panel; Future  -     Hemoglobin A1c; Future  -     Lipid Panel; Future  -     TSH Rfx On Abnormal To Free T4; Future  -     Vitamin B12; Future  -     Vitamin D,25-Hydroxy; Future      Annual exam  Colonoscopy up to phuong  Counseled on diet and exercise   Recommend dental and eye exam  hiv hep c sti  screening: the patient declines.   a1c /lipid screening: patient agreeable.   Vaccines recommended:  covid vaccine  Tdap  hepatitis    Return in about 1 year (around 4/11/2026) for Annual physical.       Trina Feldman D.O.  [facility]

## (undated) DEVICE — SUT MNCRYL PLS ANTIB UD 3/0 PS2 27IN

## (undated) DEVICE — INTENDED FOR TISSUE SEPARATION, AND OTHER PROCEDURES THAT REQUIRE A SHARP SURGICAL BLADE TO PUNCTURE OR CUT.: Brand: BARD-PARKER ® STAINLESS STEEL BLADES

## (undated) DEVICE — SUT VIC 0 UR6 27IN VCP603H

## (undated) DEVICE — BLANKT WARM UPPR/BDY ARM/OUT 57X196CM

## (undated) DEVICE — ENDOPATH XCEL BLUNT TIP TROCARS WITH SMOOTH SLEEVES: Brand: ENDOPATH XCEL

## (undated) DEVICE — LAPAROSCOPIC SMOKE FILTRATION SYSTEM: Brand: PALL LAPAROSHIELD® PLUS LAPAROSCOPIC SMOKE FILTRATION SYSTEM

## (undated) DEVICE — GLV SURG PREMIERPRO MIC LTX PF SZ8 BRN

## (undated) DEVICE — ENDOPOUCH RETRIEVER SPECIMEN RETRIEVAL BAGS: Brand: ENDOPOUCH RETRIEVER

## (undated) DEVICE — ENDOPATH XCEL BLADELESS TROCARS WITH STABILITY SLEEVES: Brand: ENDOPATH XCEL

## (undated) DEVICE — LAPAROVUE VISIBILITY SYSTEM LAPAROSCOPIC SOLUTIONS: Brand: LAPAROVUE

## (undated) DEVICE — PK LAP LASR CHOLE 10

## (undated) DEVICE — ENDOCUT SCISSOR TIP, DISPOSABLE: Brand: RENEW

## (undated) DEVICE — MARYLAND JAW LAPAROSCOPIC SEALER/DIVIDER COATED: Brand: LIGASURE

## (undated) DEVICE — ECHELON 3000 45 STANDARD: Brand: ECHELON

## (undated) DEVICE — GLV SURG PREMIERPRO MIC LTX PF SZ7.5 BRN

## (undated) DEVICE — APPL CHLORAPREP TINTED 26ML TEAL

## (undated) DEVICE — [HIGH FLOW INSUFFLATOR,  DO NOT USE IF PACKAGE IS DAMAGED,  KEEP DRY,  KEEP AWAY FROM SUNLIGHT,  PROTECT FROM HEAT AND RADIOACTIVE SOURCES.]: Brand: PNEUMOSURE

## (undated) DEVICE — MINI ENDOCUT SCISSOR TIP, DISPOSABLE: Brand: RENEW

## (undated) DEVICE — ENDOPATH XCEL UNIVERSAL TROCAR STABLILITY SLEEVES: Brand: ENDOPATH XCEL

## (undated) DEVICE — PATIENT RETURN ELECTRODE, SINGLE-USE, CONTACT QUALITY MONITORING, ADULT, WITH 9FT CORD, FOR PATIENTS WEIGING OVER 33LBS. (15KG): Brand: MEGADYNE